# Patient Record
Sex: FEMALE | Race: WHITE | NOT HISPANIC OR LATINO | Employment: OTHER | URBAN - METROPOLITAN AREA
[De-identification: names, ages, dates, MRNs, and addresses within clinical notes are randomized per-mention and may not be internally consistent; named-entity substitution may affect disease eponyms.]

---

## 2017-04-19 ENCOUNTER — OFFICE VISIT (OUTPATIENT)
Dept: FAMILY MEDICINE | Facility: CLINIC | Age: 72
End: 2017-04-19
Payer: COMMERCIAL

## 2017-04-19 VITALS
HEART RATE: 90 BPM | TEMPERATURE: 100.6 F | WEIGHT: 215.4 LBS | DIASTOLIC BLOOD PRESSURE: 68 MMHG | SYSTOLIC BLOOD PRESSURE: 130 MMHG | BODY MASS INDEX: 38.46 KG/M2

## 2017-04-19 DIAGNOSIS — M25.562 ACUTE PAIN OF LEFT KNEE: ICD-10-CM

## 2017-04-19 DIAGNOSIS — R10.32 ABDOMINAL PAIN, LEFT LOWER QUADRANT: Primary | ICD-10-CM

## 2017-04-19 DIAGNOSIS — K59.00 CONSTIPATION, UNSPECIFIED CONSTIPATION TYPE: ICD-10-CM

## 2017-04-19 DIAGNOSIS — M75.42 IMPINGEMENT SYNDROME OF LEFT SHOULDER: ICD-10-CM

## 2017-04-19 LAB
BASOPHILS # BLD AUTO: 0 10E9/L (ref 0–0.2)
BASOPHILS NFR BLD AUTO: 0.2 %
DIFFERENTIAL METHOD BLD: NORMAL
EOSINOPHIL # BLD AUTO: 0.2 10E9/L (ref 0–0.7)
EOSINOPHIL NFR BLD AUTO: 1.9 %
ERYTHROCYTE [DISTWIDTH] IN BLOOD BY AUTOMATED COUNT: 14.2 % (ref 10–15)
HCT VFR BLD AUTO: 38.6 % (ref 35–47)
HGB BLD-MCNC: 12.7 G/DL (ref 11.7–15.7)
LYMPHOCYTES # BLD AUTO: 1.1 10E9/L (ref 0.8–5.3)
LYMPHOCYTES NFR BLD AUTO: 12.4 %
MCH RBC QN AUTO: 28.7 PG (ref 26.5–33)
MCHC RBC AUTO-ENTMCNC: 32.9 G/DL (ref 31.5–36.5)
MCV RBC AUTO: 87 FL (ref 78–100)
MONOCYTES # BLD AUTO: 0.8 10E9/L (ref 0–1.3)
MONOCYTES NFR BLD AUTO: 8.8 %
NEUTROPHILS # BLD AUTO: 6.6 10E9/L (ref 1.6–8.3)
NEUTROPHILS NFR BLD AUTO: 76.7 %
PLATELET # BLD AUTO: 197 10E9/L (ref 150–450)
RBC # BLD AUTO: 4.43 10E12/L (ref 3.8–5.2)
WBC # BLD AUTO: 8.6 10E9/L (ref 4–11)

## 2017-04-19 PROCEDURE — 85025 COMPLETE CBC W/AUTO DIFF WBC: CPT | Performed by: PHYSICIAN ASSISTANT

## 2017-04-19 PROCEDURE — 99214 OFFICE O/P EST MOD 30 MIN: CPT | Performed by: PHYSICIAN ASSISTANT

## 2017-04-19 PROCEDURE — 36415 COLL VENOUS BLD VENIPUNCTURE: CPT | Performed by: PHYSICIAN ASSISTANT

## 2017-04-19 ASSESSMENT — ENCOUNTER SYMPTOMS
SHORTNESS OF BREATH: 0
DIARRHEA: 0
NAUSEA: 0
HALLUCINATIONS: 0
INSOMNIA: 0
DIAPHORESIS: 0
BLOOD IN STOOL: 0
NEUROLOGICAL NEGATIVE: 1
CONSTIPATION: 0
FEVER: 0
ABDOMINAL PAIN: 1
DIZZINESS: 0
WHEEZING: 0
MYALGIAS: 0
PHOTOPHOBIA: 0
COUGH: 0
PALPITATIONS: 0
FOCAL WEAKNESS: 0
FREQUENCY: 0
SPUTUM PRODUCTION: 0
BLURRED VISION: 0
DYSURIA: 0
SEIZURES: 0
TINGLING: 0
NECK PAIN: 0
WEIGHT LOSS: 0
EYE REDNESS: 0
VOMITING: 0
SORE THROAT: 0
DEPRESSION: 0
DOUBLE VISION: 0
ORTHOPNEA: 0
HEADACHES: 0
EYE DISCHARGE: 0
SENSORY CHANGE: 0
HEARTBURN: 0
NERVOUS/ANXIOUS: 0
EYE PAIN: 0
HEMOPTYSIS: 0
LOSS OF CONSCIOUSNESS: 0
BACK PAIN: 0
WEAKNESS: 0

## 2017-04-19 ASSESSMENT — LIFESTYLE VARIABLES: SUBSTANCE_ABUSE: 0

## 2017-04-19 ASSESSMENT — PAIN SCALES - GENERAL: PAINLEVEL: MODERATE PAIN (4)

## 2017-04-19 NOTE — PATIENT INSTRUCTIONS
Abdominal pain resolved in the next couple of days return to clinic and we can inject that left shoulder. If abdominal pain worsens or if fevers continue, contact me and we can schedule a CT of the abdomen

## 2017-04-19 NOTE — NURSING NOTE
"Chief Complaint   Patient presents with     Shoulder Pain     Knee Pain     Chills     Abdominal Pain       Initial /68 (BP Location: Right arm, Patient Position: Chair, Cuff Size: Adult Large)  Pulse 90  Temp 100.6  F (38.1  C) (Tympanic)  Wt 215 lb 6.4 oz (97.7 kg)  BMI 38.46 kg/m2 Estimated body mass index is 38.46 kg/(m^2) as calculated from the following:    Height as of 12/12/16: 5' 2.75\" (1.594 m).    Weight as of this encounter: 215 lb 6.4 oz (97.7 kg).  Medication Reconciliation: complete    Health Maintenance that is potentially due pending provider review:  NONE    n/a    Shabana CASTELLANOS CMA    "

## 2017-04-19 NOTE — MR AVS SNAPSHOT
After Visit Summary   4/19/2017    Gail Dewitt    MRN: [id number]           Patient Information     Date Of Birth          1945        Visit Information        Provider Department      4/19/2017 2:00 PM Luan Rader PA-C [facility]        Today's Diagnoses     Abdominal pain, left lower quadrant    -  1    Constipation, unspecified constipation type        Impingement syndrome of left shoulder        Acute pain of left knee          Care Instructions    Abdominal pain resolved in the next couple of days return to clinic and we can inject that left shoulder. If abdominal pain worsens or if fevers continue, contact me and we can schedule a CT of the abdomen        Follow-ups after your visit        Follow-up notes from your care team     Return if symptoms worsen or fail to improve.      Who to contact     If you have questions or need follow up information about today's clinic visit or your schedule please contact [facility] directly at [phone].  Normal or non-critical lab and imaging results will be communicated to you by [employer], letter or phone within 4 business days after the clinic has received the results. If you do not hear from us within 7 days, please contact the clinic through [employer] or phone. If you have a critical or abnormal lab result, we will notify you by phone as soon as possible.  Submit refill requests through [employer] or call your pharmacy and they will forward the refill request to us. Please allow 3 business days for your refill to be completed.          Additional Information About Your Visit        MyChart Information     [employer] gives you secure access to your electronic health record. If you see a primary care provider, you can also send messages to your care team and make appointments. If you have questions, please call your primary care clinic.  If you do not have a primary care provider, please call [phone] and they  will assist you.        Care EveryWhere ID     This is your Care EveryWhere ID. This could be used by other organizations to access your [place] medical records  [id number]        Your Vitals Were     Pulse Temperature BMI (Body Mass Index)             90 100.6  F (38.1  C) (Tympanic) 38.46 kg/m2          Blood Pressure from Last 3 Encounters:   04/19/17 130/68   12/12/16 126/58   09/14/16 104/62    Weight from Last 3 Encounters:   04/19/17 215 lb 6.4 oz (97.7 kg)   12/12/16 209 lb 12.8 oz (95.2 kg)   04/18/16 196 lb 9.6 oz (89.2 kg)              We Performed the Following     CBC with platelets and differential        Primary Care Provider Office Phone # Fax #    Aliyah Aggarwal [phone]       [facility] [address] [place] [id number]        Thank you!     Thank you for choosing [facility]  for your care. Our goal is always to provide you with excellent care. Hearing back from our patients is one way we can continue to improve our services. Please take a few minutes to complete the written survey that you may receive in the mail after your visit with us. Thank you!             Your Updated Medication List - Protect others around you: Learn how to safely use, store and throw away your medicines at www.disposemymeds.org.          This list is accurate as of: 4/19/17  3:26 PM.  Always use your most recent med list.                   Brand Name Dispense Instructions for use    aspirin 81 MG tablet     100    1 TABLET BY MOUTH DAILY       CALCIUM 1200 PO      Take 1 tablet by mouth daily       cycloSPORINE 0.05 % ophthalmic emulsion    RESTASIS     Place 1 drop into both eyes 2 times daily       FIBER PO          levothyroxine 112 MCG tablet    SYNTHROID/LEVOTHROID    90 tablet    Take 1 tablet (112 mcg) by mouth daily       losartan 50 MG tablet    COZAAR    90 tablet    Take 1 tablet (50 mg) by mouth daily       TYLENOL PM EXTRA STRENGTH PO      Take  by mouth At Bedtime       vitamin D 2000 UNITS Caps      Take  by mouth daily.

## 2017-04-19 NOTE — PROGRESS NOTES
HPI    SUBJECTIVE:                                                    Gail Dewitt is a 72 year old female who presents to clinic today for abdominal pain was present for the past 3 days. She's had some constipation in the past and states that she hasn't had a bowel movement in the last few days. She has had chills and some night sweats associated with this. She has a normal colonoscopy in the last few years. She has been seen for constipation issues in the past but states that the fiber supplement is not helping her constipation.   Her shoulder pain has returned and she responded extremely well to a cortisone injection approximately 8 months ago. She would like another injection if possible  She has developed left knee pain and points to the medial joint line as well as the pes anserine bursal area as the source of pain. She's had no injury to cause this.    ENT Symptoms             Symptoms: cc Present Absent Comment   Fever/Chills  x  States that they were so bad Sunday that she was actually shaking   Fatigue  x     Muscle Aches   x    Eye Irritation   x    Sneezing   x    Nasal Mc/Drg   x    Sinus Pressure/Pain   x    Loss of smell  x     Dental pain   x    Sore Throat   x    Swollen Glands   x    Ear Pain/Fullness   x    Cough   x    Wheeze   x    Chest Pain   x    Shortness of breath   x    Rash       Other  x  No appetite but says that she is always wanting to drink cold water      Symptom duration:  Since Sunday    Symptom severity:     Treatments tried:     Contacts:  Not that she knows of        Abdominal Pain      Duration: months to a year     Description (location/character/radiation): Patient states that she was seen for this about a year ago and had found out that she had been constipated. Tried metamucil and since has not had what she considers normal stools. Says she has switched to a fiber pill and that she can go days without a Bm and then has a day where she does nothing but go to the  bathroom       Associated flank pain: None    Intensity:  mild    Accompanying signs and symptoms:        Fever/Chills: YES- Chills       Gas/Bloating: no        Nausea/vomitting: no        Diarrhea: no-Loose stools       Dysuria or Hematuria: no     History (previous similar pain/trauma/previous testing): none    Precipitating or alleviating factors:       Pain worse with eating/BM/urination: no       Pain relieved by BM: no     Therapies tried and outcome: Metamucil and fiber pills     LMP:  not applicable     Musculoskeletal problem/pain      Duration: knee about a month. Shoulder a year     Description  Location: Left knee and Left shoulder    Intensity:  moderate, severe    Accompanying signs and symptoms: none    History  Previous similar problem: no   Previous evaluation:  x-ray    Precipitating or alleviating factors:  Trauma or overuse: YES  Aggravating factors include: movement and laying in bed     Therapies tried and outcome: Cortisone injections for the shoulder    Problem list and histories reviewed & adjusted, as indicated.  Additional history: as documented    Patient Active Problem List   Diagnosis     Hypothyroidism     Other psoriasis     TIBIAL MUSCULAR ATROPHY     Microscopic Hematuria     CARDIOVASCULAR SCREENING; LDL GOAL LESS THAN 130     Cold sore     GERD (gastroesophageal reflux disease)     Hip pain     Senile nuclear sclerosis     Family history of breast cancer     Advanced directives, counseling/discussion     Left shoulder pain     Benign essential hypertension     Past Surgical History:   Procedure Laterality Date     COLPORRHAPHY ANTERIOR  12/6/2010    COLPORRHAPHY ANTERIOR performed by MAY RIVERA at [facility]     HYSTERECTOMY, [facility]  1978    Hysterectomy     INJECT EPIDURAL LUMBAR  10/19/2011    Procedure:INJECT EPIDURAL LUMBAR; DESHAWN-; Surgeon:HERMILA ANESTHESIA PROVIDER; Location:[facility] OR     INJECT JOINT SACROILIAC  2/7/2011    INJECT JOINT SACROILIAC performed by GENERIC  ANESTHESIA PROVIDER at [facility] OR     INJECT JOINT SACROILIAC  4/25/2012    Procedure:INJECT JOINT SACROILIAC; DESHAWN SI Joint --; Surgeon:GENERIC ANESTHESIA PROVIDER; Location:[facility]     PHACOEMULSIFICATION WITH STANDARD INTRAOCULAR LENS IMPLANT  5/26/2011    Procedure:PHACOEMULSIFICATION WITH STANDARD INTRAOCULAR LENS IMPLANT; Surgeon:JOSÉ MANUEL DORSEY; Location:[facility]     PHACOEMULSIFICATION WITH STANDARD INTRAOCULAR LENS IMPLANT  6/9/2011    Procedure:PHACOEMULSIFICATION WITH STANDARD INTRAOCULAR LENS IMPLANT; Surgeon:JOSÉ MANUEL DORSEY; Location:[facility]     SURGICAL HISTORY OF -   1948    tonsillectomy and adenoidectomy     SURGICAL HISTORY OF -   1965    appendectomy and right ovarian cyst     SURGICAL HISTORY OF -   1970    right breast biopsy     SURGICAL HISTORY OF -       bilateral foot surgery; subtalar joint fusion; bunion     SURGICAL HISTORY OF -   2009    Posterior colporrhaphy w/ posterior Pro-Lift       Social History   Substance Use Topics     Smoking status: Former Smoker     Years: 20.00     Quit date: 9/23/1985     Smokeless tobacco: Never Used     Alcohol use Yes      Comment: 1 Q 1-2 weeks     Family History   Problem Relation Age of Onset     Cardiovascular Brother      Hypertension Brother      Thyroid Disease Brother      Cardiovascular Paternal Grandmother      HEART DISEASE Paternal Grandmother      RIP MI age 71     Cardiovascular Paternal Grandfather      C.A.D. Mother      Hypertension Mother      Breast Cancer Mother      Alcohol/Drug Mother      Allergies Mother      Depression Mother      OSTEOPOROSIS Mother      Breast Cancer Maternal Grandmother      Breast Cancer Sister      OSTEOPOROSIS Sister      Thyroid Disease Father      Cardiovascular Maternal Grandfather      HEART DISEASE Maternal Grandfather      RIP MI, age 81     Neurologic Disorder Son      Obesity Daughter      GASTROINTESTINAL DISEASE Daughter      gallbladder surg     Thyroid Disease Son          Current Outpatient  Prescriptions   Medication Sig Dispense Refill     FIBER PO        levothyroxine (SYNTHROID/LEVOTHROID) 112 MCG tablet Take 1 tablet (112 mcg) by mouth daily 90 tablet 3     losartan (COZAAR) 50 MG tablet Take 1 tablet (50 mg) by mouth daily 90 tablet 3     Calcium Carbonate-Vit D-Min (CALCIUM 1200 PO) Take 1 tablet by mouth daily       Diphenhydramine-APAP, sleep, (TYLENOL PM EXTRA STRENGTH PO) Take by mouth At Bedtime       cycloSPORINE (RESTASIS) 0.05 % ophthalmic emulsion Place 1 drop into both eyes 2 times daily       Cholecalciferol (VITAMIN D) 2000 UNITS CAPS Take  by mouth daily.       ASPIRIN 81 MG OR TABS 1 TABLET BY MOUTH DAILY 100 3     Allergies   Allergen Reactions     Ace Inhibitors Cough     Amlodipine Besylate      Swellings       Dilaudid [Hydromorphone Hcl] Hives     Percocet [Oxycodone-Acetaminophen] Other (See Comments)     Severe dizziness       Labs reviewed in EPIC    Reviewed and updated as needed this visit by clinical staff       Reviewed and updated as needed this visit by Provider       Review of Systems   Constitutional: Negative for diaphoresis, fever, malaise/fatigue and weight loss.   HENT: Negative for congestion, ear discharge, ear pain, hearing loss, nosebleeds and sore throat.    Eyes: Negative for blurred vision, double vision, photophobia, pain, discharge and redness.   Respiratory: Negative for cough, hemoptysis, sputum production, shortness of breath and wheezing.    Cardiovascular: Negative for chest pain, palpitations, orthopnea and leg swelling.   Gastrointestinal: Positive for abdominal pain. Negative for blood in stool, constipation, diarrhea, heartburn, melena, nausea and vomiting.   Genitourinary: Negative.  Negative for dysuria, frequency and urgency.   Musculoskeletal: Positive for joint pain. Negative for back pain, myalgias and neck pain.   Skin: Negative for itching and rash.   Neurological: Negative.  Negative for dizziness, tingling, sensory change, focal  weakness, seizures, loss of consciousness, weakness and headaches.   Endo/Heme/Allergies: Negative.    Psychiatric/Behavioral: Negative for depression, hallucinations, substance abuse and suicidal ideas. The patient is not nervous/anxious and does not have insomnia.          Physical Exam   Constitutional: She is oriented to person, place, and time and well-developed, well-nourished, and in no distress. No distress.   HENT:   Head: Normocephalic and atraumatic.   Right Ear: External ear normal.   Left Ear: External ear normal.   Nose: Nose normal.   Eyes: Conjunctivae and EOM are normal. Pupils are equal, round, and reactive to light. Right eye exhibits no discharge. Left eye exhibits no discharge. No scleral icterus.   Neck: Normal range of motion. Neck supple. No JVD present. No tracheal deviation present. No thyromegaly present.   Cardiovascular: Normal rate, regular rhythm, normal heart sounds and intact distal pulses.  Exam reveals no gallop and no friction rub.    No murmur heard.  Pulmonary/Chest: Effort normal and breath sounds normal. No stridor. No respiratory distress. She has no wheezes. She has no rales. She exhibits no tenderness.   Abdominal: Soft. Bowel sounds are normal. She exhibits no distension and no mass. There is no hepatosplenomegaly. There is tenderness in the left lower quadrant. There is no rebound, no guarding and no CVA tenderness. No hernia.       Musculoskeletal: Normal range of motion. She exhibits no edema.        Left shoulder: She exhibits tenderness and pain. She exhibits normal range of motion, no bony tenderness, no spasm and normal strength.        Left knee: She exhibits normal range of motion and no swelling. Tenderness found. Medial joint line tenderness noted.        Legs:  Tenderness is present over the past anserine bursa as well as the medial joint line of the left knee. She has a positive impingement sign on the left shoulder exam but good strength.   Lymphadenopathy:      She has no cervical adenopathy.   Neurological: She is alert and oriented to person, place, and time. She has normal reflexes. No cranial nerve deficit. She exhibits normal muscle tone. Gait normal.   Skin: Skin is warm and dry. No rash noted. She is not diaphoretic. No erythema. No pallor.   Psychiatric: Mood, memory, affect and judgment normal.         (R10.32) Abdominal pain, left lower quadrant  (primary encounter diagnosis)  Comment:   Plan: CBC with platelets and differential            (K59.00) Constipation, unspecified constipation type  Comment:  Plan:     (M75.42) Impingement syndrome of left shoulder  Comment:   Plan:     (M25.562) Acute pain of left knee  Comment:   Plan:     CBC was within normal limits and for her abdominal pain have asked that we try a course of MiraLAX because of the constipation issues and the fact that she's had a normal colonoscopy in the recent past. If her pain persists or fevers or worsening, CT of the abdomen would be the next step.  She would like a repeat shoulder injection because her last injection works so well. However with her abdominal pain I have asked that old off on the injection until her abdominal pain has settled down.  For her knee pain this appears to be    Pes Anserine bursitis as well as some arthritic symptoms and x-ray of the knee is advised if she is interested in further treatment. She would like to hold off at this time.

## 2017-06-20 ENCOUNTER — OFFICE VISIT (OUTPATIENT)
Dept: FAMILY MEDICINE | Facility: CLINIC | Age: 72
End: 2017-06-20
Payer: COMMERCIAL

## 2017-06-20 ENCOUNTER — RADIANT APPOINTMENT (OUTPATIENT)
Dept: GENERAL RADIOLOGY | Facility: CLINIC | Age: 72
End: 2017-06-20
Attending: PHYSICIAN ASSISTANT
Payer: COMMERCIAL

## 2017-06-20 VITALS
TEMPERATURE: 97.5 F | HEART RATE: 72 BPM | HEIGHT: 63 IN | DIASTOLIC BLOOD PRESSURE: 68 MMHG | SYSTOLIC BLOOD PRESSURE: 126 MMHG

## 2017-06-20 DIAGNOSIS — M25.561 ACUTE PAIN OF RIGHT KNEE: Primary | ICD-10-CM

## 2017-06-20 DIAGNOSIS — M25.551 HIP PAIN, RIGHT: ICD-10-CM

## 2017-06-20 DIAGNOSIS — M25.561 ACUTE PAIN OF RIGHT KNEE: ICD-10-CM

## 2017-06-20 PROCEDURE — 99214 OFFICE O/P EST MOD 30 MIN: CPT | Performed by: PHYSICIAN ASSISTANT

## 2017-06-20 PROCEDURE — 73560 X-RAY EXAM OF KNEE 1 OR 2: CPT | Mod: RT

## 2017-06-20 ASSESSMENT — ENCOUNTER SYMPTOMS
HEMOPTYSIS: 0
HEARTBURN: 0
WEIGHT LOSS: 0
SPUTUM PRODUCTION: 0
FOCAL WEAKNESS: 0
ABDOMINAL PAIN: 0
FEVER: 0
DEPRESSION: 0
COUGH: 0
PHOTOPHOBIA: 0
BLURRED VISION: 0
CONSTIPATION: 0
NECK PAIN: 0
NAUSEA: 0
INSOMNIA: 0
BLOOD IN STOOL: 0
EYE REDNESS: 0
DYSURIA: 0
SENSORY CHANGE: 0
EYE PAIN: 0
EYE DISCHARGE: 0
TINGLING: 0
DIARRHEA: 0
WEAKNESS: 0
HEADACHES: 0
HALLUCINATIONS: 0
DIZZINESS: 0
NERVOUS/ANXIOUS: 0
WHEEZING: 0
ORTHOPNEA: 0
PALPITATIONS: 0
DOUBLE VISION: 0
BACK PAIN: 0
SORE THROAT: 0
SEIZURES: 0
FREQUENCY: 0
SHORTNESS OF BREATH: 0
NEUROLOGICAL NEGATIVE: 1
VOMITING: 0
DIAPHORESIS: 0
MYALGIAS: 0
LOSS OF CONSCIOUSNESS: 0

## 2017-06-20 ASSESSMENT — LIFESTYLE VARIABLES: SUBSTANCE_ABUSE: 0

## 2017-06-20 NOTE — PROGRESS NOTES
"HPI    SUBJECTIVE:                                                    Gail Dewitt is a 72 year old female who presents to clinic today for right knee pain for the past week. She also has had some associated right hip pain. She's had no injury but states that her knee \"gave out\" while she was simply standing. She denies any numbness or tingling associated with this. With her hip pain she points to the trochanteric area but also anterior buttocks area and also on that right side.      Musculoskeletal problem/pain      Duration: about one week    Description  Location: right knee and right hip    Intensity:  7/10    Accompanying signs and symptoms: none    History  Previous similar problem: no   Previous evaluation:  none    Precipitating or alleviating factors:  Trauma or overuse: no   Aggravating factors include: sitting on a hard chair    Therapies tried and outcome: aleve helps a little           Problem list and histories reviewed & adjusted, as indicated.  Additional history: as documented    Patient Active Problem List   Diagnosis     Hypothyroidism     Other psoriasis     TIBIAL MUSCULAR ATROPHY     Microscopic Hematuria     CARDIOVASCULAR SCREENING; LDL GOAL LESS THAN 130     Cold sore     GERD (gastroesophageal reflux disease)     Hip pain     Senile nuclear sclerosis     Family history of breast cancer     Advanced directives, counseling/discussion     Left shoulder pain     Benign essential hypertension     Past Surgical History:   Procedure Laterality Date     COLPORRHAPHY ANTERIOR  12/6/2010    COLPORRHAPHY ANTERIOR performed by MAY RIVERA at [facility]     CYSTOCELE REPAIR  2007     HYSTERECTOMY, JOSEE  1978    Hysterectomy     INJECT EPIDURAL LUMBAR  10/19/2011    Procedure:INJECT EPIDURAL LUMBAR; DESHAWN-; Surgeon:GENERIC ANESTHESIA PROVIDER; Location:[facility]     INJECT JOINT SACROILIAC  2/7/2011    INJECT JOINT SACROILIAC performed by GENERIC ANESTHESIA PROVIDER at [facility]     INJECT JOINT " SACROILIAC  4/25/2012    Procedure:INJECT JOINT SACROILIAC; DESHAWN SI Joint --; Surgeon:GENERIC ANESTHESIA PROVIDER; Location:[facility]     PHACOEMULSIFICATION WITH STANDARD INTRAOCULAR LENS IMPLANT  5/26/2011    Procedure:PHACOEMULSIFICATION WITH STANDARD INTRAOCULAR LENS IMPLANT; Surgeon:JOSÉ MANUEL DORSEY; Location:[facility]     PHACOEMULSIFICATION WITH STANDARD INTRAOCULAR LENS IMPLANT  6/9/2011    Procedure:PHACOEMULSIFICATION WITH STANDARD INTRAOCULAR LENS IMPLANT; Surgeon:JOSÉ MANUEL DORSEY; Location:[facility]     RECTOCELE REPAIR  2007     SURGICAL HISTORY OF -   1948    tonsillectomy and adenoidectomy     SURGICAL HISTORY OF -   1965    appendectomy and right ovarian cyst     SURGICAL HISTORY OF -   1970    right breast biopsy     SURGICAL HISTORY OF -       bilateral foot surgery; subtalar joint fusion; bunion     SURGICAL HISTORY OF -   2009    Posterior colporrhaphy w/ posterior Pro-Lift       Social History   Substance Use Topics     Smoking status: Former Smoker     Years: 20.00     Quit date: 9/23/1985     Smokeless tobacco: Never Used     Alcohol use Yes      Comment: 1 Q 1-2 weeks     Family History   Problem Relation Age of Onset     Cardiovascular Brother      Hypertension Brother      Thyroid Disease Brother      Cardiovascular Paternal Grandmother      HEART DISEASE Paternal Grandmother      RIP MI age 71     Cardiovascular Paternal Grandfather      C.A.D. Mother      Hypertension Mother      Breast Cancer Mother      Alcohol/Drug Mother      Allergies Mother      Depression Mother      OSTEOPOROSIS Mother      Breast Cancer Maternal Grandmother      Breast Cancer Sister      OSTEOPOROSIS Sister      Thyroid Disease Father      Cardiovascular Maternal Grandfather      HEART DISEASE Maternal Grandfather      RIP MI, age 81     Neurologic Disorder Son      Obesity Daughter      GASTROINTESTINAL DISEASE Daughter      gallbladder surg     Thyroid Disease Son          Current Outpatient Prescriptions    Medication Sig Dispense Refill     Omega-3 Fatty Acids (OMEGA 3 PO) Take by mouth daily       propylene glycol (SYSTANE BALANCE) 0.6 % SOLN ophthalmic solution 1 drop       nabumetone (RELAFEN) 750 MG tablet Take 1 tablet (750 mg) by mouth 2 times daily as needed for moderate pain 30 tablet 1     levothyroxine (SYNTHROID/LEVOTHROID) 112 MCG tablet Take 1 tablet (112 mcg) by mouth daily 90 tablet 3     losartan (COZAAR) 50 MG tablet Take 1 tablet (50 mg) by mouth daily 90 tablet 3     Calcium Carbonate-Vit D-Min (CALCIUM 1200 PO) Take 1 tablet by mouth daily       Diphenhydramine-APAP, sleep, (TYLENOL PM EXTRA STRENGTH PO) Take by mouth At Bedtime       cycloSPORINE (RESTASIS) 0.05 % ophthalmic emulsion Place 1 drop into both eyes 2 times daily       ASPIRIN 81 MG OR TABS 1 TABLET BY MOUTH DAILY 100 3     Cholecalciferol (VITAMIN D) 2000 UNITS CAPS Take  by mouth daily.       Allergies   Allergen Reactions     Ace Inhibitors Cough     Amlodipine Besylate      Swellings       Dilaudid [Hydromorphone Hcl] Hives     Percocet [Oxycodone-Acetaminophen] Other (See Comments)     Severe dizziness       Labs reviewed in EPIC    Reviewed and updated as needed this visit by clinical staff       Reviewed and updated as needed this visit by Provider               Review of Systems   Constitutional: Negative for diaphoresis, fever, malaise/fatigue and weight loss.   HENT: Negative for congestion, ear discharge, ear pain, hearing loss, nosebleeds and sore throat.    Eyes: Negative for blurred vision, double vision, photophobia, pain, discharge and redness.   Respiratory: Negative for cough, hemoptysis, sputum production, shortness of breath and wheezing.    Cardiovascular: Negative for chest pain, palpitations, orthopnea and leg swelling.   Gastrointestinal: Negative for abdominal pain, blood in stool, constipation, diarrhea, heartburn, melena, nausea and vomiting.   Genitourinary: Negative.  Negative for dysuria, frequency and  urgency.   Musculoskeletal: Positive for joint pain. Negative for back pain, myalgias and neck pain.   Skin: Negative for itching and rash.   Neurological: Negative.  Negative for dizziness, tingling, sensory change, focal weakness, seizures, loss of consciousness, weakness and headaches.   Endo/Heme/Allergies: Negative.    Psychiatric/Behavioral: Negative for depression, hallucinations, substance abuse and suicidal ideas. The patient is not nervous/anxious and does not have insomnia.          Physical Exam   Constitutional: She is oriented to person, place, and time and well-developed, well-nourished, and in no distress. No distress.   HENT:   Head: Normocephalic and atraumatic.   Right Ear: External ear normal.   Left Ear: External ear normal.   Nose: Nose normal.   Eyes: Conjunctivae and EOM are normal. Pupils are equal, round, and reactive to light. Right eye exhibits no discharge. Left eye exhibits no discharge. No scleral icterus.   Neck: Normal range of motion. Neck supple. No JVD present. No tracheal deviation present. No thyromegaly present.   Cardiovascular: Normal rate, regular rhythm, normal heart sounds and intact distal pulses.  Exam reveals no gallop and no friction rub.    No murmur heard.  Pulmonary/Chest: Effort normal and breath sounds normal. No stridor. No respiratory distress. She has no wheezes. She has no rales. She exhibits no tenderness.   Abdominal: Soft. Bowel sounds are normal. She exhibits no distension and no mass. There is no tenderness. There is no rebound and no guarding.   Musculoskeletal: Normal range of motion. She exhibits no edema.        Right hip: She exhibits tenderness and bony tenderness. She exhibits normal range of motion, normal strength, no swelling, no crepitus and no deformity.        Right knee: She exhibits normal range of motion, no swelling, no effusion, no ecchymosis, no deformity, no erythema, normal alignment, no LCL laxity, no bony tenderness, normal meniscus  and no MCL laxity. Tenderness found. Medial joint line and lateral joint line tenderness noted.   Lymphadenopathy:     She has no cervical adenopathy.   Neurological: She is alert and oriented to person, place, and time. She has normal reflexes. No cranial nerve deficit. She exhibits normal muscle tone. Gait normal.   Skin: Skin is warm and dry. No rash noted. She is not diaphoretic. No erythema. No pallor.   Psychiatric: Mood, memory, affect and judgment normal.         (M25.561) Acute pain of right knee  (primary encounter diagnosis)  Comment:   Plan: nabumetone (RELAFEN) 750 MG tablet, CANCELED:         XR Knee Standing Right 2 Views            (M25.551) Hip pain, right  Comment:   Plan:    X-rays reveal no acute abnormality but there is some calcium changes within the meniscus itself on both knees and some lateral compartment arthritic changes.  I have suggested anti-inflammatory medications in the form of Relafen 750 mg twice a day and if symptoms are not improving over the next 1-2 weeks consider injection, physical therapy

## 2017-06-20 NOTE — MR AVS SNAPSHOT
After Visit Summary   6/20/2017    Gail Dewitt    MRN: [id number]           Patient Information     Date Of Birth          1945        Visit Information        Provider Department      6/20/2017 10:00 AM Luan Rader PA-C [facility]        Today's Diagnoses     Acute pain of right knee    -  1    Hip pain, right          Care Instructions    If Relafen is not resolving symptoms, recheck and consider injection or physical therapy or orthopedic referral          Follow-ups after your visit        Follow-up notes from your care team     Return if symptoms worsen or fail to improve.      Who to contact     If you have questions or need follow up information about today's clinic visit or your schedule please contact [facility] directly at [phone].  Normal or non-critical lab and imaging results will be communicated to you by [employer], letter or phone within 4 business days after the clinic has received the results. If you do not hear from us within 7 days, please contact the clinic through [employer] or phone. If you have a critical or abnormal lab result, we will notify you by phone as soon as possible.  Submit refill requests through [employer] or call your pharmacy and they will forward the refill request to us. Please allow 3 business days for your refill to be completed.          Additional Information About Your Visit        MyChart Information     [employer] gives you secure access to your electronic health record. If you see a primary care provider, you can also send messages to your care team and make appointments. If you have questions, please call your primary care clinic.  If you do not have a primary care provider, please call [phone] and they will assist you.        Care EveryWhere ID     This is your Care EveryWhere ID. This could be used by other organizations to access your [facility] medical records  [id number]        Your Vitals Were      "Pulse Temperature Height             72 97.5  F (36.4  C) (Tympanic) 5' 2.75\" (1.594 m)          Blood Pressure from Last 3 Encounters:   06/20/17 126/68   04/19/17 130/68   12/12/16 126/58    Weight from Last 3 Encounters:   04/19/17 215 lb 6.4 oz (97.7 kg)   12/12/16 209 lb 12.8 oz (95.2 kg)   04/18/16 196 lb 9.6 oz (89.2 kg)              Today, you had the following     No orders found for display         Today's Medication Changes          These changes are accurate as of: 6/20/17 12:17 PM.  If you have any questions, ask your nurse or doctor.               Start taking these medicines.        Dose/Directions    nabumetone 750 MG tablet   Commonly known as:  RELAFEN   Used for:  Acute pain of right knee   Started by:  Luan Rader PA-C        Dose:  750 mg   Take 1 tablet (750 mg) by mouth 2 times daily as needed for moderate pain   Quantity:  30 tablet   Refills:  1         Stop taking these medicines if you haven't already. Please contact your care team if you have questions.     FIBER PO   Stopped by:  Luan Rader PA-C                Where to get your medicines      These medications were sent to [id number] [address] [facility]  [address] [place] [id number]    Hours:  Closed 10-16-08 business to [facility] Phone:  [phone]     nabumetone 750 MG tablet                Primary Care Provider Office Phone # Fax #    Aliyah Aggarwal [phone]       [facility] [id number] [facility] [address]        Thank you!     Thank you for choosing [facility]  for your care. Our goal is always to provide you with excellent care. Hearing back from our patients is one way we can continue to improve our services. Please take a few minutes to complete the written survey that you may receive in the mail after your visit with us. Thank you!             Your Updated Medication List - Protect others around you: Learn " how to safely use, store and throw away your medicines at www.disposemymeds.org.          This list is accurate as of: 6/20/17 12:17 PM.  Always use your most recent med list.                   Brand Name Dispense Instructions for use    aspirin 81 MG tablet     100    1 TABLET BY MOUTH DAILY       CALCIUM 1200 PO      Take 1 tablet by mouth daily       cycloSPORINE 0.05 % ophthalmic emulsion    RESTASIS     Place 1 drop into both eyes 2 times daily       levothyroxine 112 MCG tablet    SYNTHROID/LEVOTHROID    90 tablet    Take 1 tablet (112 mcg) by mouth daily       losartan 50 MG tablet    COZAAR    90 tablet    Take 1 tablet (50 mg) by mouth daily       nabumetone 750 MG tablet    RELAFEN    30 tablet    Take 1 tablet (750 mg) by mouth 2 times daily as needed for moderate pain       OMEGA 3 PO      Take by mouth daily       propylene glycol 0.6 % Soln ophthalmic solution    SYSTANE BALANCE     1 drop       TYLENOL PM EXTRA STRENGTH PO      Take by mouth At Bedtime       vitamin D 2000 UNITS Caps      Take  by mouth daily.

## 2017-06-20 NOTE — PATIENT INSTRUCTIONS
If Relafen is not resolving symptoms, recheck and consider injection or physical therapy or orthopedic referral

## 2017-06-20 NOTE — NURSING NOTE
"Chief Complaint   Patient presents with     Musculoskeletal Problem       Initial /68 (BP Location: Right arm, Cuff Size: Adult Large)  Pulse 72  Temp 97.5  F (36.4  C) (Tympanic)  Ht 5' 2.75\" (1.594 m) Estimated body mass index is 38.46 kg/(m^2) as calculated from the following:    Height as of 12/12/16: 5' 2.75\" (1.594 m).    Weight as of 4/19/17: 215 lb 6.4 oz (97.7 kg).  Medication Reconciliation: complete     Charley Mclain MA     "

## 2017-10-02 ENCOUNTER — RADIANT APPOINTMENT (OUTPATIENT)
Dept: MAMMOGRAPHY | Facility: CLINIC | Age: 72
End: 2017-10-02
Attending: FAMILY MEDICINE
Payer: COMMERCIAL

## 2017-10-02 ENCOUNTER — OFFICE VISIT (OUTPATIENT)
Dept: FAMILY MEDICINE | Facility: CLINIC | Age: 72
End: 2017-10-02
Payer: COMMERCIAL

## 2017-10-02 VITALS
BODY MASS INDEX: 38.8 KG/M2 | DIASTOLIC BLOOD PRESSURE: 60 MMHG | SYSTOLIC BLOOD PRESSURE: 130 MMHG | HEART RATE: 72 BPM | HEIGHT: 63 IN | TEMPERATURE: 98.1 F | WEIGHT: 219 LBS

## 2017-10-02 DIAGNOSIS — G89.29 CHRONIC LEFT SHOULDER PAIN: ICD-10-CM

## 2017-10-02 DIAGNOSIS — Z12.31 VISIT FOR SCREENING MAMMOGRAM: ICD-10-CM

## 2017-10-02 DIAGNOSIS — E03.9 ACQUIRED HYPOTHYROIDISM: ICD-10-CM

## 2017-10-02 DIAGNOSIS — Z00.01 ENCOUNTER FOR WELL ADULT EXAM WITH ABNORMAL FINDINGS: Primary | ICD-10-CM

## 2017-10-02 DIAGNOSIS — M25.512 CHRONIC LEFT SHOULDER PAIN: ICD-10-CM

## 2017-10-02 DIAGNOSIS — K21.9 GASTROESOPHAGEAL REFLUX DISEASE WITHOUT ESOPHAGITIS: ICD-10-CM

## 2017-10-02 DIAGNOSIS — Z23 NEED FOR PROPHYLACTIC VACCINATION AND INOCULATION AGAINST INFLUENZA: ICD-10-CM

## 2017-10-02 DIAGNOSIS — I10 BENIGN ESSENTIAL HYPERTENSION: ICD-10-CM

## 2017-10-02 PROBLEM — Z71.89 ADVANCED DIRECTIVES, COUNSELING/DISCUSSION: Status: ACTIVE | Noted: 2017-10-02

## 2017-10-02 LAB
ALBUMIN SERPL-MCNC: 3.4 G/DL (ref 3.4–5)
ALP SERPL-CCNC: 286 U/L (ref 40–150)
ALT SERPL W P-5'-P-CCNC: 36 U/L (ref 0–50)
ANION GAP SERPL CALCULATED.3IONS-SCNC: 5 MMOL/L (ref 3–14)
AST SERPL W P-5'-P-CCNC: 40 U/L (ref 0–45)
BASOPHILS # BLD AUTO: 0 10E9/L (ref 0–0.2)
BASOPHILS NFR BLD AUTO: 0.3 %
BILIRUB DIRECT SERPL-MCNC: 0.2 MG/DL (ref 0–0.2)
BILIRUB SERPL-MCNC: 0.5 MG/DL (ref 0.2–1.3)
BUN SERPL-MCNC: 23 MG/DL (ref 7–30)
CALCIUM SERPL-MCNC: 8.7 MG/DL (ref 8.5–10.1)
CHLORIDE SERPL-SCNC: 104 MMOL/L (ref 94–109)
CHOLEST SERPL-MCNC: 228 MG/DL
CO2 SERPL-SCNC: 28 MMOL/L (ref 20–32)
CREAT SERPL-MCNC: 0.67 MG/DL (ref 0.52–1.04)
DIFFERENTIAL METHOD BLD: NORMAL
EOSINOPHIL # BLD AUTO: 0.6 10E9/L (ref 0–0.7)
EOSINOPHIL NFR BLD AUTO: 8.5 %
ERYTHROCYTE [DISTWIDTH] IN BLOOD BY AUTOMATED COUNT: 13.7 % (ref 10–15)
GFR SERPL CREATININE-BSD FRML MDRD: 86 ML/MIN/1.7M2
GLUCOSE SERPL-MCNC: 85 MG/DL (ref 70–99)
HCT VFR BLD AUTO: 38.5 % (ref 35–47)
HDLC SERPL-MCNC: 86 MG/DL
HGB BLD-MCNC: 12.4 G/DL (ref 11.7–15.7)
LDLC SERPL CALC-MCNC: 129 MG/DL
LYMPHOCYTES # BLD AUTO: 1.3 10E9/L (ref 0.8–5.3)
LYMPHOCYTES NFR BLD AUTO: 19.8 %
MCH RBC QN AUTO: 28.4 PG (ref 26.5–33)
MCHC RBC AUTO-ENTMCNC: 32.2 G/DL (ref 31.5–36.5)
MCV RBC AUTO: 88 FL (ref 78–100)
MONOCYTES # BLD AUTO: 0.6 10E9/L (ref 0–1.3)
MONOCYTES NFR BLD AUTO: 9 %
NEUTROPHILS # BLD AUTO: 4.1 10E9/L (ref 1.6–8.3)
NEUTROPHILS NFR BLD AUTO: 62.4 %
NONHDLC SERPL-MCNC: 142 MG/DL
PLATELET # BLD AUTO: 257 10E9/L (ref 150–450)
POTASSIUM SERPL-SCNC: 4.2 MMOL/L (ref 3.4–5.3)
PROT SERPL-MCNC: 7.7 G/DL (ref 6.8–8.8)
RBC # BLD AUTO: 4.36 10E12/L (ref 3.8–5.2)
SODIUM SERPL-SCNC: 137 MMOL/L (ref 133–144)
T4 FREE SERPL-MCNC: 1.36 NG/DL (ref 0.76–1.46)
TRIGL SERPL-MCNC: 66 MG/DL
TSH SERPL DL<=0.005 MIU/L-ACNC: 5.54 MU/L (ref 0.4–4)
WBC # BLD AUTO: 6.5 10E9/L (ref 4–11)

## 2017-10-02 PROCEDURE — 85025 COMPLETE CBC W/AUTO DIFF WBC: CPT | Performed by: FAMILY MEDICINE

## 2017-10-02 PROCEDURE — G0008 ADMIN INFLUENZA VIRUS VAC: HCPCS | Performed by: FAMILY MEDICINE

## 2017-10-02 PROCEDURE — 90662 IIV NO PRSV INCREASED AG IM: CPT | Performed by: FAMILY MEDICINE

## 2017-10-02 PROCEDURE — 80061 LIPID PANEL: CPT | Performed by: FAMILY MEDICINE

## 2017-10-02 PROCEDURE — G0438 PPPS, INITIAL VISIT: HCPCS | Mod: 25 | Performed by: FAMILY MEDICINE

## 2017-10-02 PROCEDURE — 84439 ASSAY OF FREE THYROXINE: CPT | Performed by: FAMILY MEDICINE

## 2017-10-02 PROCEDURE — 84443 ASSAY THYROID STIM HORMONE: CPT | Performed by: FAMILY MEDICINE

## 2017-10-02 PROCEDURE — 80048 BASIC METABOLIC PNL TOTAL CA: CPT | Performed by: FAMILY MEDICINE

## 2017-10-02 PROCEDURE — G0202 SCR MAMMO BI INCL CAD: HCPCS | Mod: TC

## 2017-10-02 PROCEDURE — 80076 HEPATIC FUNCTION PANEL: CPT | Performed by: FAMILY MEDICINE

## 2017-10-02 PROCEDURE — 36415 COLL VENOUS BLD VENIPUNCTURE: CPT | Performed by: FAMILY MEDICINE

## 2017-10-02 PROCEDURE — 20610 DRAIN/INJ JOINT/BURSA W/O US: CPT | Mod: LT | Performed by: FAMILY MEDICINE

## 2017-10-02 RX ORDER — LOSARTAN POTASSIUM 50 MG/1
50 TABLET ORAL DAILY
Qty: 90 TABLET | Refills: 3 | Status: SHIPPED | OUTPATIENT
Start: 2017-10-02

## 2017-10-02 RX ORDER — LEVOTHYROXINE SODIUM 112 UG/1
112 TABLET ORAL DAILY
Qty: 90 TABLET | Refills: 3 | Status: SHIPPED | OUTPATIENT
Start: 2017-10-02 | End: 2017-12-21 | Stop reason: DRUGHIGH

## 2017-10-02 RX ORDER — TRIAMCINOLONE ACETONIDE 40 MG/ML
40 INJECTION, SUSPENSION INTRA-ARTICULAR; INTRAMUSCULAR ONCE
Qty: 1 ML | Refills: 0 | COMMUNITY
Start: 2017-10-02 | End: 2017-10-05

## 2017-10-02 NOTE — PROGRESS NOTES
SUBJECTIVE:   Gail Dewitt is a 72 year old female who presents for Preventive Visit.      Are you in the first 12 months of your Medicare Part B coverage?  No    Healthy Habits:    Do you get at least three servings of calcium containing foods daily (dairy, green leafy vegetables, etc.)? no, taking calcium and/or vitamin D supplement: yes -     Amount of exercise or daily activities, outside of work: 2 day(s) per week    Problems taking medications regularly No    Medication side effects: No    Have you had an eye exam in the past two years? yes    Do you see a dentist twice per year? No, dentures    Do you have sleep apnea, excessive snoring or daytime drowsiness?no    COGNITIVE SCREEN  1) Repeat 3 items (Banana, Sunrise, Chair)    2) Clock draw: NORMAL  3) 3 item recall: Recalls 3 objects  Results: 3 items recalled: COGNITIVE IMPAIRMENT LESS LIKELY    Mini-CogTM Copyright [address]. Licensed by the author for use in [facility]; reprinted with permission ([email or web address]). All rights reserved.              Musculoskeletal problem/pain      Duration: years     Description  Location: left shoulder pain she has had this for several years and has had injections in the past and this has helped   Slowly this has increased with now pain with over the head and laying on it at night     Intensity:  moderate    Accompanying signs and symptoms: none    History  Previous similar problem: YES  Previous evaluation:  none    Precipitating or alleviating factors:  Trauma or overuse: no   Aggravating factors include: overuse    Therapies tried and outcome: rest/inactivity, heat and ice    Hypertension Follow-up      Outpatient blood pressures are not being checked.    Low Salt Diet: no added salt    Hypothyroidism Follow-up      Since last visit, patient describes the following symptoms: Weight stable, no hair loss, no skin changes, no constipation, no loose stools          Reviewed and updated as needed this visit by  clinical staff  Tobacco  Allergies  Meds  Problems         Reviewed and updated as needed this visit by Provider  Allergies  Meds  Problems        Social History   Substance Use Topics     Smoking status: Former Smoker     Years: 20.00     Quit date: 9/23/1985     Smokeless tobacco: Never Used     Alcohol use Yes      Comment: 1 Q 1-2 weeks       The patient does not drink >3 drinks per day nor >7 drinks per week.    Today's PHQ-2 Score:   PHQ-2 ( 1999 Pf[employer]) 10/2/2017 6/20/2017   Q1: Little interest or pleasure in doing things 0 0   Q2: Feeling down, depressed or hopeless 0 0   PHQ-2 Score 0 0         Do you feel safe in your environment - Yes    Do you have a Health Care Directive?: Yes: Advance Directive has been received and scanned.    Current providers sharing in care for this patient include: Patient Care Team:  Aliyah Aggarwal MD as PCP - General (Family Practice)      Hearing impairment: No    Ability to successfully perform activities of daily living: Yes, no assistance needed     Fall risk:  Fallen 2 or more times in the past year?: Yes  Any fall with injury in the past year?: No      Home safety:  lack of grab bars in the bathroom      The following health maintenance items are reviewed in Epic and correct as of today:  Health Maintenance   Topic Date Due     PHQ-9 Q1YR  02/07/1962     ANYA QUESTIONNAIRE 1 YEAR  10/28/2014     INFLUENZA VACCINE (SYSTEM ASSIGNED)  09/01/2017     TSH Q1 YEAR  12/12/2017     FALL RISK ASSESSMENT  06/20/2018     MAMMO Q1 YR  10/02/2018     TETANUS IMMUNIZATION (SYSTEM ASSIGNED)  06/01/2019     COLON CANCER SCREEN (SYSTEM ASSIGNED)  04/22/2020     LIPID SCREEN Q5 YR FEMALE (SYSTEM ASSIGNED)  12/12/2021     ADVANCE DIRECTIVE PLANNING Q5 YRS  10/02/2022     DEXA SCAN SCREENING (SYSTEM ASSIGNED)  Completed     PNEUMOCOCCAL  Completed     Labs reviewed in EPIC        ROS:  C: NEGATIVE for fever, chills, change in weight  I: NEGATIVE for worrisome rashes, moles or  "lesions  E: NEGATIVE for vision changes or irritation  E/M: NEGATIVE for ear, mouth and throat problems  R: NEGATIVE for significant cough or SOB  B: NEGATIVE for masses, tenderness or discharge  CV: NEGATIVE for chest pain, palpitations or peripheral edema  GI: NEGATIVE for nausea, abdominal pain, heartburn, or change in bowel habits  : NEGATIVE for frequency, dysuria, or hematuria  N: NEGATIVE for weakness, dizziness or paresthesias  E: NEGATIVE for temperature intolerance, skin/hair changes  H: NEGATIVE for bleeding problems  P: NEGATIVE for changes in mood or affect    OBJECTIVE:   /60 (BP Location: Right arm, Patient Position: Chair, Cuff Size: Adult Large)  Pulse 72  Temp 98.1  F (36.7  C) (Tympanic)  Ht 5' 2.75\" (1.594 m)  Wt 219 lb (99.3 kg)  BMI 39.1 kg/m2 Estimated body mass index is 39.1 kg/(m^2) as calculated from the following:    Height as of this encounter: 5' 2.75\" (1.594 m).    Weight as of this encounter: 219 lb (99.3 kg).  EXAM:   GENERAL: healthy, alert and no distress  EYES: Eyes grossly normal to inspection, PERRL and conjunctivae and sclerae normal  HENT: ear canals and TM's normal, nose and mouth without ulcers or lesions  NECK: no adenopathy, no asymmetry, masses, or scars and thyroid normal to palpation  RESP: lungs clear to auscultation - no rales, rhonchi or wheezes  BREAST: normal without masses, tenderness or nipple discharge and no palpable axillary masses or adenopathy  CV: regular rate and rhythm, normal S1 S2, no S3 or S4, no murmur, click or rub, no peripheral edema and peripheral pulses strong  ABDOMEN: soft, nontender, no hepatosplenomegaly, no masses and bowel sounds normal  MS: no gross musculoskeletal defects noted, no edema  Left shoulder is tneder to palpation anteriorly full ROM active and passive with pain reproduced with internal and external rotation   SKIN: no suspicious lesions or rashes  NEURO: Normal strength and tone, mentation intact and speech " "normal  PSYCH: mentation appears normal, affect normal/bright    PROCEDURE:  JOINT ASPIRATION/INJECTION.    After a discussion of risks, benefits and side effects of procedure, informed patient consent was obtained.  The left shoulder was prepped and draped in the usual clean fashion (sterile not required for this procedure).       INJECTION:  Using 1.0 cc of 1 % lidocaine mixed with 40 mg of kenalog, the left shoulder was successfully injected without complication.  Patient did experience some pain relief following injection.        ASSESSMENT / PLAN:   1. Encounter for well adult exam with abnormal findings      2. Acquired hypothyroidism  Adjust meds as indicated by above labs.   - levothyroxine (SYNTHROID/LEVOTHROID) 112 MCG tablet; Take 1 tablet (112 mcg) by mouth daily  Dispense: 90 tablet; Refill: 3  - TSH with free T4 reflex    3. Benign essential hypertension  Stable no change in treatment plan.   - losartan (COZAAR) 50 MG tablet; Take 1 tablet (50 mg) by mouth daily  Dispense: 90 tablet; Refill: 3  - Hepatic panel  - Basic metabolic panel  - CBC with platelets differential  - Lipid panel reflex to direct LDL    4. Gastroesophageal reflux disease without esophagitis    - ranitidine (ZANTAC) 150 MG tablet; Take 1 tablet (150 mg) by mouth 2 times daily  Dispense: 60 tablet; Refill: 1    5. Chronic left shoulder pain  Flare of chronic pain   - DRAIN/INJECT LARGE JOINT/BURSA    6. Need for prophylactic vaccination and inoculation against influenza    - FLU VACCINE, INCREASED ANTIGEN, PRESV FREE, AGE 65+ [66760]  - ADMIN INFLUENZA (For MEDICARE Patients ONLY) []    End of Life Planning:  Patient currently has an advanced directive: Yes.  Practitioner is supportive of decision.    COUNSELING:  Reviewed preventive health counseling, as reflected in patient instructions        Estimated body mass index is 39.1 kg/(m^2) as calculated from the following:    Height as of this encounter: 5' 2.75\" (1.594 m).    " Weight as of this encounter: 219 lb (99.3 kg).  Weight management plan: Discussed healthy diet and exercise guidelines and patient will follow up in 6 months in clinic to re-evaluate.   reports that she quit smoking about 32 years ago. She quit after 20.00 years of use. She has never used smokeless tobacco.        Appropriate preventive services were discussed with this patient, including applicable screening as appropriate for cardiovascular disease, diabetes, osteopenia/osteoporosis, and glaucoma.  As appropriate for age/gender, discussed screening for colorectal cancer, prostate cancer, breast cancer, and cervical cancer. Checklist reviewing preventive services available has been given to the patient.    Reviewed patients plan of care and provided an AVS. The Basic Care Plan (routine screening as documented in Health Maintenance) for Gail meets the Care Plan requirement. This Care Plan has been established and reviewed with the Patient.    Counseling Resources:  ATP IV Guidelines  Pooled Cohorts Equation Calculator  Breast Cancer Risk Calculator  FRAX Risk Assessment  ICSI Preventive Guidelines  Dietary Guidelines for Americans, 2010  "[employer]'s MyPlate  ASA Prophylaxis  Lung CA Screening    Aliyah Aggarwal MD  [facility] Influenza Immunization Documentation    1.  Is the person to be vaccinated sick today?   No    2. Does the person to be vaccinated have an allergy to a component   of the vaccine?   No    3. Has the person to be vaccinated ever had a serious reaction   to influenza vaccine in the past?   No    4. Has the person to be vaccinated ever had Guillain-Barré syndrome?   No    Form completed by patient

## 2017-10-02 NOTE — MR AVS SNAPSHOT
After Visit Summary   10/2/2017    Gail Dewitt    MRN: [id number]           Patient Information     Date Of Birth          1945        Visit Information        Provider Department      10/2/2017 8:40 AM Aliyah Aggarwal MD [facility]        Today's Diagnoses     Encounter for well adult exam with abnormal findings    -  1    Acquired hypothyroidism        Benign essential hypertension        Gastroesophageal reflux disease without esophagitis        Chronic left shoulder pain          Care Instructions      Preventive Health Recommendations  Female Ages 65 +    Yearly exam:     See your health care provider every year in order to  o Review health changes.   o Discuss preventive care.    o Review your medicines if your doctor has prescribed any.      You no longer need a yearly Pap test unless you've had an abnormal Pap test in the past 10 years. If you have vaginal symptoms, such as bleeding or discharge, be sure to talk with your provider about a Pap test.      Every 1 to 2 years, have a mammogram.  If you are over 69, talk with your health care provider about whether or not you want to continue having screening mammograms.      Every 10 years, have a colonoscopy. Or, have a yearly FIT test (stool test). These exams will check for colon cancer.       Have a cholesterol test every 5 years, or more often if your doctor advises it.       Have a diabetes test (fasting glucose) every three years. If you are at risk for diabetes, you should have this test more often.       At age 65, have a bone density scan (DEXA) to check for osteoporosis (brittle bone disease).    Shots:    Get a flu shot each year.    Get a tetanus shot every 10 years.    Talk to your doctor about your pneumonia vaccines. There are now two you should receive - Pneumovax (PPSV 23) and Prevnar (PCV 13).    Talk to your doctor about the shingles vaccine.    Talk to your doctor about the hepatitis B  vaccine.    Nutrition:     Eat at least 5 servings of fruits and vegetables each day.      Eat whole-grain bread, whole-wheat pasta and brown rice instead of white grains and rice.      Talk to your provider about Calcium and Vitamin D.     Lifestyle    Exercise at least 150 minutes a week (30 minutes a day, 5 days a week). This will help you control your weight and prevent disease.      Limit alcohol to one drink per day.      No smoking.       Wear sunscreen to prevent skin cancer.       See your dentist twice a year for an exam and cleaning.      See your eye doctor every 1 to 2 years to screen for conditions such as glaucoma, macular degeneration, cataracts, etc     Labs today     Left should injected. This may be slightly more painful today and then should get better    Medications refilled     Recheck with me in 6 months           Follow-ups after your visit        Who to contact     If you have questions or need follow up information about today's clinic visit or your schedule please contact [facility] directly at [phone].  Normal or non-critical lab and imaging results will be communicated to you by [employer], letter or phone within 4 business days after the clinic has received the results. If you do not hear from us within 7 days, please contact the clinic through [email or web address] or phone. If you have a critical or abnormal lab result, we will notify you by phone as soon as possible.  Submit refill requests through [email or web address] or call your pharmacy and they will forward the refill request to us. Please allow 3 business days for your refill to be completed.          Additional Information About Your Visit        [email or web address] Information     [email or web address] gives you secure access to your electronic health record. If you see a primary care provider, you can also send messages to your care team and make appointments. If you have questions, please call your primary care clinic.  If you do not have a primary care  "provider, please call [phone] and they will assist you.        Care EveryWhere ID     This is your Care EveryWhere ID. This could be used by other organizations to access your [facility] medical records  [id number]        Your Vitals Were     Pulse Temperature Height BMI (Body Mass Index)          72 98.1  F (36.7  C) (Tympanic) 5' 2.75\" (1.594 m) 39.1 kg/m2         Blood Pressure from Last 3 Encounters:   10/02/17 130/60   06/20/17 126/68   04/19/17 130/68    Weight from Last 3 Encounters:   10/02/17 219 lb (99.3 kg)   04/19/17 215 lb 6.4 oz (97.7 kg)   12/12/16 209 lb 12.8 oz (95.2 kg)              We Performed the Following     Basic metabolic panel     CBC with platelets differential     Hepatic panel     Lipid panel reflex to direct LDL     TSH with free T4 reflex          Today's Medication Changes          These changes are accurate as of: 10/2/17  9:17 AM.  If you have any questions, ask your nurse or doctor.               These medicines have changed or have updated prescriptions.        Dose/Directions    * ZANTAC 75 PO   This may have changed:  Another medication with the same name was added. Make sure you understand how and when to take each.   Changed by:  Aliyah Aggarwal MD        Dose:  1 tablet   Take 1 tablet by mouth daily   Refills:  0       * ranitidine 150 MG tablet   Commonly known as:  ZANTAC   This may have changed:  You were already taking a medication with the same name, and this prescription was added. Make sure you understand how and when to take each.   Used for:  Gastroesophageal reflux disease without esophagitis   Changed by:  Aliyah Aggarwal MD        Dose:  150 mg   Take 1 tablet (150 mg) by mouth 2 times daily   Quantity:  60 tablet   Refills:  1       * Notice:  This list has 2 medication(s) that are the same as other medications prescribed for you. Read the directions carefully, and ask your doctor or other care provider to review them with you.         Where " to get your medicines      These medications were sent to [id number] - [place], [place] - [address]  [address] [address]    Hours:  Closed 10-16-08 business to [facility] Phone:  [phone]     levothyroxine 112 MCG tablet    losartan 50 MG tablet    ranitidine 150 MG tablet                Primary Care Provider Office Phone # Fax #    Aliyah Aggarwal [phone] [address] [facility] [address]        Equal Access to Services     : Hadii aad ku hadasho Soomaali, waaxda luqadaha, qaybta kaalmada adeegyada, waxay bernadette bermudez . So [place] -[phone].    ATENCIÓN: Si habla español, tiene a cates disposición servicios gratuitos de asistencia lingüística. Ll[facility] [phone].    We comply with applicable federal civil rights laws and [place] laws. We do not discriminate on the basis of race, color, national origin, age, disability, sex, sexual orientation, or gender identity.            Thank you!     Thank you for choosing [facility]  for your care. Our goal is always to provide you with excellent care. Hearing back from our patients is one way we can continue to improve our services. Please take a few minutes to complete the written survey that you may receive in the mail after your visit with us. Thank you!             Your Updated Medication List - Protect others around you: Learn how to safely use, store and throw away your medicines at www.disposemymeds.org.          This list is accurate as of: 10/2/17  9:17 AM.  Always use your most recent med list.                   Brand Name Dispense Instructions for use Diagnosis    aspirin 81 MG tablet     100    1 TABLET BY MOUTH DAILY    Essential hypertension, benign       CALCIUM 1200 PO      Take 1 tablet by mouth daily        cycloSPORINE 0.05 % ophthalmic emulsion    RESTASIS     Place 1 drop into both eyes 2 times daily         levothyroxine 112 MCG tablet    SYNTHROID/LEVOTHROID    90 tablet    Take 1 tablet (112 mcg) by mouth daily    Acquired hypothyroidism       losartan 50 MG tablet    COZAAR    90 tablet    Take 1 tablet (50 mg) by mouth daily    Benign essential hypertension       nabumetone 750 MG tablet    RELAFEN    30 tablet    Take 1 tablet (750 mg) by mouth 2 times daily as needed for moderate pain    Acute pain of right knee       OMEGA 3 PO      Take by mouth daily        propylene glycol 0.6 % Soln ophthalmic solution    SYSTANE BALANCE     1 drop        TYLENOL PM EXTRA STRENGTH PO      Take by mouth At Bedtime    Pain of left lower extremity       vitamin D 2000 UNITS Caps      Take  by mouth daily.        * ZANTAC 75 PO      Take 1 tablet by mouth daily        * ranitidine 150 MG tablet    ZANTAC    60 tablet    Take 1 tablet (150 mg) by mouth 2 times daily    Gastroesophageal reflux disease without esophagitis       * Notice:  This list has 2 medication(s) that are the same as other medications prescribed for you. Read the directions carefully, and ask your doctor or other care provider to review them with you.

## 2017-10-02 NOTE — NURSING NOTE
"Chief Complaint   Patient presents with     Wellness Visit       Initial /60 (BP Location: Right arm, Patient Position: Chair, Cuff Size: Adult Large)  Pulse 72  Temp 98.1  F (36.7  C) (Tympanic)  Ht 5' 2.75\" (1.594 m)  Wt 219 lb (99.3 kg)  BMI 39.1 kg/m2 Estimated body mass index is 39.1 kg/(m^2) as calculated from the following:    Height as of this encounter: 5' 2.75\" (1.594 m).    Weight as of this encounter: 219 lb (99.3 kg).  Medication Reconciliation: complete    Health Maintenance that is potentially due pending provider review:  NONE    n/a    Is there anyone who you would like to be able to receive your results? No  If yes have patient fill out AMOR    "

## 2017-10-02 NOTE — PATIENT INSTRUCTIONS

## 2017-10-03 DIAGNOSIS — E03.9 HYPOTHYROIDISM, UNSPECIFIED TYPE: Primary | ICD-10-CM

## 2017-10-05 ENCOUNTER — OFFICE VISIT (OUTPATIENT)
Dept: FAMILY MEDICINE | Facility: CLINIC | Age: 72
End: 2017-10-05
Payer: COMMERCIAL

## 2017-10-05 ENCOUNTER — RADIANT APPOINTMENT (OUTPATIENT)
Dept: GENERAL RADIOLOGY | Facility: CLINIC | Age: 72
End: 2017-10-05
Attending: PHYSICIAN ASSISTANT
Payer: COMMERCIAL

## 2017-10-05 VITALS
HEART RATE: 60 BPM | TEMPERATURE: 97.8 F | BODY MASS INDEX: 39.1 KG/M2 | SYSTOLIC BLOOD PRESSURE: 130 MMHG | DIASTOLIC BLOOD PRESSURE: 60 MMHG | RESPIRATION RATE: 16 BRPM | WEIGHT: 219 LBS

## 2017-10-05 DIAGNOSIS — M54.50 ACUTE RIGHT-SIDED LOW BACK PAIN WITHOUT SCIATICA: Primary | ICD-10-CM

## 2017-10-05 DIAGNOSIS — M54.50 ACUTE RIGHT-SIDED LOW BACK PAIN WITHOUT SCIATICA: ICD-10-CM

## 2017-10-05 LAB
ALBUMIN UR-MCNC: NEGATIVE MG/DL
APPEARANCE UR: CLEAR
BILIRUB UR QL STRIP: NEGATIVE
COLOR UR AUTO: YELLOW
GLUCOSE UR STRIP-MCNC: NEGATIVE MG/DL
HGB UR QL STRIP: NEGATIVE
KETONES UR STRIP-MCNC: NEGATIVE MG/DL
LEUKOCYTE ESTERASE UR QL STRIP: ABNORMAL
NITRATE UR QL: NEGATIVE
PH UR STRIP: 7 PH (ref 5–7)
RBC #/AREA URNS AUTO: NORMAL /HPF
SOURCE: ABNORMAL
SP GR UR STRIP: 1.01 (ref 1–1.03)
UROBILINOGEN UR STRIP-ACNC: 0.2 EU/DL (ref 0.2–1)
WBC #/AREA URNS AUTO: NORMAL /HPF

## 2017-10-05 PROCEDURE — 81001 URINALYSIS AUTO W/SCOPE: CPT | Performed by: PHYSICIAN ASSISTANT

## 2017-10-05 PROCEDURE — 72100 X-RAY EXAM L-S SPINE 2/3 VWS: CPT

## 2017-10-05 PROCEDURE — 99214 OFFICE O/P EST MOD 30 MIN: CPT | Performed by: PHYSICIAN ASSISTANT

## 2017-10-05 RX ORDER — KETOROLAC TROMETHAMINE 10 MG/1
10 TABLET, FILM COATED ORAL EVERY 6 HOURS PRN
Qty: 20 TABLET | Refills: 0 | Status: SHIPPED | OUTPATIENT
Start: 2017-10-05 | End: 2017-12-19

## 2017-10-05 ASSESSMENT — ENCOUNTER SYMPTOMS
HEADACHES: 0
DIARRHEA: 0
DIZZINESS: 0
EYE REDNESS: 0
ABDOMINAL PAIN: 0
CONSTIPATION: 0
COUGH: 0
WEAKNESS: 0
BLURRED VISION: 0
DYSURIA: 0
DIAPHORESIS: 0
FOCAL WEAKNESS: 0
NECK PAIN: 0
DOUBLE VISION: 0
PHOTOPHOBIA: 0
BACK PAIN: 1
SPUTUM PRODUCTION: 0
SENSORY CHANGE: 0
WHEEZING: 0
EYE DISCHARGE: 0
FEVER: 0
BLOOD IN STOOL: 0
WEIGHT LOSS: 0
PALPITATIONS: 0
SEIZURES: 0
VOMITING: 0
SORE THROAT: 0
TINGLING: 0
HEMOPTYSIS: 0
MYALGIAS: 1
LOSS OF CONSCIOUSNESS: 0
ORTHOPNEA: 0
HEARTBURN: 0
NAUSEA: 0
DEPRESSION: 0
NEUROLOGICAL NEGATIVE: 1
NERVOUS/ANXIOUS: 0
SHORTNESS OF BREATH: 0
HALLUCINATIONS: 0
EYE PAIN: 0
INSOMNIA: 0
FREQUENCY: 0

## 2017-10-05 ASSESSMENT — LIFESTYLE VARIABLES: SUBSTANCE_ABUSE: 0

## 2017-10-05 ASSESSMENT — PAIN SCALES - GENERAL: PAINLEVEL: MODERATE PAIN (4)

## 2017-10-05 NOTE — MR AVS SNAPSHOT
After Visit Summary   10/5/2017    Gail Dewitt    MRN: [id number]           Patient Information     Date Of Birth          1945        Visit Information        Provider Department      10/5/2017 10:20 AM Luan Rader PA-C [facility]        Today's Diagnoses     Acute right-sided low back pain without sciatica    -  1       Follow-ups after your visit        Follow-up notes from your care team     Return if symptoms worsen or fail to improve.      Your next 10 appointments already scheduled     Oct 18, 2017  9:15 AM CDT   LAB with NB LAB   [facility] ([facility])    [address] [place] [id number]   [phone]           Patient must bring picture ID. Patient should be prepared to give a urine specimen  Please do not eat 10-12 hours before your appointment if you are coming in fasting for labs on lipids, cholesterol, or glucose (sugar). Pregnant women should follow their Care Team instructions. Water with medications is okay. Do not drink coffee or other fluids. If you have concerns about taking  your medications, please ask at office or if scheduling via [employer], send a message by clicking on Secure Messaging, Message Your Care Team.              Who to contact     If you have questions or need follow up information about today's clinic visit or your schedule please contact [facility] directly at [phone].  Normal or non-critical lab and imaging results will be communicated to you by [employer], letter or phone within 4 business days after the clinic has received the results. If you do not hear from us within 7 days, please contact the clinic through Heyday[employer] or phone. If you have a critical or abnormal lab result, we will notify you by phone as soon as possible.  Submit refill requests through [employer] or call your pharmacy and they will forward the refill request to us. Please allow 3 business days for  your refill to be completed.          Additional Information About Your Visit        ClearSlide[employer] Information     [employer] gives you secure access to your electronic health record. If you see a primary care provider, you can also send messages to your care team and make appointments. If you have questions, please call your primary care clinic.  If you do not have a primary care provider, please call [phone] and they will assist you.        Care EveryWhere ID     This is your Care EveryWhere ID. This could be used by other organizations to access your [facility] medical records  [id number]        Your Vitals Were     Pulse Temperature Respirations BMI (Body Mass Index)          60 97.8  F (36.6  C) (Tympanic) 16 39.1 kg/m2         Blood Pressure from Last 3 Encounters:   10/05/17 130/60   10/02/17 130/60   06/20/17 126/68    Weight from Last 3 Encounters:   10/05/17 219 lb (99.3 kg)   10/02/17 219 lb (99.3 kg)   04/19/17 215 lb 6.4 oz (97.7 kg)              We Performed the Following     UA reflex to Microscopic and Culture     Urine Microscopic          Today's Medication Changes          These changes are accurate as of: 10/5/17 11:35 AM.  If you have any questions, ask your nurse or doctor.               Start taking these medicines.        Dose/Directions    ketorolac 10 MG tablet   Commonly known as:  TORADOL   Used for:  Acute right-sided low back pain without sciatica   Started by:  Luan Rader PA-C        Dose:  10 mg   Take 1 tablet (10 mg) by mouth every 6 hours as needed   Quantity:  20 tablet   Refills:  0            Where to get your medicines      These medications were sent to [id number] [address] [facility]  [address] [facility] [address]    Hours:  Closed 10-16-08 business to [facility] Phone:  [phone]     ketorolac 10 MG tablet                Primary Care Provider Office Phone # Fax #    Aliyah Aggarwal [phone] [facility] [address]        Equal Access to Services     BRANDI SARAVIA : Hadii aad ku hadmichellege Barrera, waefremda svitlanakrunalha, qaagta joselinmamaria del carmen rodgers. So [facility] [place] [facility] [phone].    ATENCIÓN: Si habla español, tiene a cates disposición servicios gratuitos de asistencia lingüística. [facility] [phone].    We comply with applicable federal civil rights laws and [place] laws. We do not discriminate on the basis of race, color, national origin, age, disability, sex, sexual orientation, or gender identity.            Thank you!     Thank you for choosing [facility]  for your care. Our goal is always to provide you with excellent care. Hearing back from our patients is one way we can continue to improve our services. Please take a few minutes to complete the written survey that you may receive in the mail after your visit with us. Thank you!             Your Updated Medication List - Protect others around you: Learn how to safely use, store and throw away your medicines at www.disposemymeds.org.          This list is accurate as of: 10/5/17 11:35 AM.  Always use your most recent med list.                   Brand Name Dispense Instructions for use Diagnosis    aspirin 81 MG tablet     100    1 TABLET BY MOUTH DAILY    Essential hypertension, benign       CALCIUM 1200 PO      Take 1 tablet by mouth daily        cycloSPORINE 0.05 % ophthalmic emulsion    RESTASIS     Place 1 drop into both eyes 2 times daily        ketorolac 10 MG tablet    TORADOL    20 tablet    Take 1 tablet (10 mg) by mouth every 6 hours as needed    Acute right-sided low back pain without sciatica       levothyroxine 112 MCG tablet    SYNTHROID/LEVOTHROID    90 tablet    Take 1 tablet (112 mcg) by mouth daily    Acquired hypothyroidism       losartan 50 MG tablet    COZAAR    90 tablet    Take 1 tablet (50 mg) by mouth daily    Benign essential hypertension       nabumetone 750 MG tablet     RELAFEN    30 tablet    Take 1 tablet (750 mg) by mouth 2 times daily as needed for moderate pain    Acute pain of right knee       OMEGA 3 PO      Take by mouth daily        propylene glycol 0.6 % Soln ophthalmic solution    SYSTANE BALANCE     1 drop        ranitidine 150 MG tablet    ZANTAC    60 tablet    Take 1 tablet (150 mg) by mouth 2 times daily    Gastroesophageal reflux disease without esophagitis       TYLENOL PM EXTRA STRENGTH PO      Take by mouth At Bedtime    Pain of left lower extremity       vitamin D 2000 UNITS Caps      Take  by mouth daily.

## 2017-10-05 NOTE — PROGRESS NOTES
HPI    SUBJECTIVE:   Gail Dewitt is a 72 year old female who presents to clinic today for back pain that began 3 days ago. She points to the right lower back area and states that this does not radiate down her leg. She states that her symptoms increase as the day progresses. She denies any bowel or bladder control issues.    Back Pain       Duration: Since Tuesday morning         Specific cause: none    Description:   Location of pain: low back right  Character of pain: sharp, dull ache and constant  Pain radiation:none  New numbness or weakness in legs, not attributed to pain:  no     Intensity: moderate    History:   Pain interferes with job: Not applicable  History of back problems: no prior back problems  Any previous MRI or X-rays: None  Sees a specialist for back pain:  No  Therapies tried without relief: none    Alleviating factors:   Improved by: Laying down       Precipitating factors:  Worsened by: Walking    Functional and Psychosocial Screen (Private Outlet STarT Back):      Not performed today                Problem list and histories reviewed & adjusted, as indicated.  Additional history: as documented    Patient Active Problem List   Diagnosis     Hypothyroidism     Other psoriasis     TIBIAL MUSCULAR ATROPHY     Microscopic Hematuria     CARDIOVASCULAR SCREENING; LDL GOAL LESS THAN 130     Cold sore     GERD (gastroesophageal reflux disease)     Hip pain     Senile nuclear sclerosis     Family history of breast cancer     Advanced directives, counseling/discussion     Left shoulder pain     Benign essential hypertension     Chronic left shoulder pain     Past Surgical History:   Procedure Laterality Date     COLPORRHAPHY ANTERIOR  12/6/2010    COLPORRHAPHY ANTERIOR performed by MAY RIVERA at [facility]     CYSTOCELE REPAIR  2007     HYSTERECTOMY, JOSEE  1978    Hysterectomy     INJECT EPIDURAL LUMBAR  10/19/2011    Procedure:INJECT EPIDURAL LUMBAR; DESHAWN-; Surgeon:GENERIC ANESTHESIA PROVIDER;  Location:[facility]     INJECT JOINT SACROILIAC  2/7/2011    INJECT JOINT SACROILIAC performed by GENERIC ANESTHESIA PROVIDER at [facility]     INJECT JOINT SACROILIAC  4/25/2012    Procedure:INJECT JOINT SACROILIAC; DESHAWN SI Joint --; Surgeon:GENERIC ANESTHESIA PROVIDER; Location:[facility]     PHACOEMULSIFICATION WITH STANDARD INTRAOCULAR LENS IMPLANT  5/26/2011    Procedure:PHACOEMULSIFICATION WITH STANDARD INTRAOCULAR LENS IMPLANT; Surgeon:JOSÉ MANUEL DORSEY; Location:[facility]     PHACOEMULSIFICATION WITH STANDARD INTRAOCULAR LENS IMPLANT  6/9/2011    Procedure:PHACOEMULSIFICATION WITH STANDARD INTRAOCULAR LENS IMPLANT; Surgeon:JOSÉ MANUEL DORSEY; Location:[facility]     RECTOCELE REPAIR  2007     SURGICAL HISTORY OF -   1948    tonsillectomy and adenoidectomy     SURGICAL HISTORY OF -   1965    appendectomy and right ovarian cyst     SURGICAL HISTORY OF -   1970    right breast biopsy     SURGICAL HISTORY OF -       bilateral foot surgery; subtalar joint fusion; bunion     SURGICAL HISTORY OF -   2009    Posterior colporrhaphy w/ posterior Pro-Lift       Social History   Substance Use Topics     Smoking status: Former Smoker     Years: 20.00     Quit date: 9/23/1985     Smokeless tobacco: Never Used     Alcohol use Yes      Comment: 1 Q 1-2 weeks     Family History   Problem Relation Age of Onset     Cardiovascular Brother      Hypertension Brother      Thyroid Disease Brother      Cardiovascular Paternal Grandmother      HEART DISEASE Paternal Grandmother      RIP MI age 71     Cardiovascular Paternal Grandfather      C.A.D. Mother      Hypertension Mother      Breast Cancer Mother      Alcohol/Drug Mother      Allergies Mother      Depression Mother      OSTEOPOROSIS Mother      Breast Cancer Maternal Grandmother      Breast Cancer Sister      OSTEOPOROSIS Sister      Thyroid Disease Father      Cardiovascular Maternal Grandfather      HEART DISEASE Maternal Grandfather      RIP MI, age 81     Neurologic Disorder Son       Obesity Daughter      GASTROINTESTINAL DISEASE Daughter      gallbladder surg     Thyroid Disease Son          Current Outpatient Prescriptions   Medication Sig Dispense Refill     ketorolac (TORADOL) 10 MG tablet Take 1 tablet (10 mg) by mouth every 6 hours as needed 20 tablet 0     levothyroxine (SYNTHROID/LEVOTHROID) 112 MCG tablet Take 1 tablet (112 mcg) by mouth daily 90 tablet 3     losartan (COZAAR) 50 MG tablet Take 1 tablet (50 mg) by mouth daily 90 tablet 3     ranitidine (ZANTAC) 150 MG tablet Take 1 tablet (150 mg) by mouth 2 times daily 60 tablet 1     Omega-3 Fatty Acids (OMEGA 3 PO) Take by mouth daily       propylene glycol (SYSTANE BALANCE) 0.6 % SOLN ophthalmic solution 1 drop       nabumetone (RELAFEN) 750 MG tablet Take 1 tablet (750 mg) by mouth 2 times daily as needed for moderate pain 30 tablet 1     Calcium Carbonate-Vit D-Min (CALCIUM 1200 PO) Take 1 tablet by mouth daily       Diphenhydramine-APAP, sleep, (TYLENOL PM EXTRA STRENGTH PO) Take by mouth At Bedtime       cycloSPORINE (RESTASIS) 0.05 % ophthalmic emulsion Place 1 drop into both eyes 2 times daily       Cholecalciferol (VITAMIN D) 2000 UNITS CAPS Take  by mouth daily.       ASPIRIN 81 MG OR TABS 1 TABLET BY MOUTH DAILY 100 3     Allergies   Allergen Reactions     Ace Inhibitors Cough     Amlodipine Besylate      Swellings       Dilaudid [Hydromorphone Hcl] Hives     Percocet [Oxycodone-Acetaminophen] Other (See Comments)     Severe dizziness       Labs reviewed in EPIC      Reviewed and updated as needed this visit by clinical staffTobacco  Allergies  Meds  Med Hx  Surg Hx  Fam Hx  Soc Hx      Reviewed and updated as needed this visit by Provider             Review of Systems   Constitutional: Negative for diaphoresis, fever, malaise/fatigue and weight loss.   HENT: Negative for congestion, ear discharge, ear pain, hearing loss, nosebleeds and sore throat.    Eyes: Negative for blurred vision, double vision, photophobia,  pain, discharge and redness.   Respiratory: Negative for cough, hemoptysis, sputum production, shortness of breath and wheezing.    Cardiovascular: Negative for chest pain, palpitations, orthopnea and leg swelling.   Gastrointestinal: Negative for abdominal pain, blood in stool, constipation, diarrhea, heartburn, melena, nausea and vomiting.   Genitourinary: Negative.  Negative for dysuria, frequency and urgency.   Musculoskeletal: Positive for back pain and myalgias. Negative for joint pain and neck pain.   Skin: Negative for itching and rash.   Neurological: Negative.  Negative for dizziness, tingling, sensory change, focal weakness, seizures, loss of consciousness, weakness and headaches.   Endo/Heme/Allergies: Negative.    Psychiatric/Behavioral: Negative for depression, hallucinations, substance abuse and suicidal ideas. The patient is not nervous/anxious and does not have insomnia.          Physical Exam   Constitutional: She is oriented to person, place, and time and well-developed, well-nourished, and in no distress. No distress.   HENT:   Head: Normocephalic and atraumatic.   Right Ear: External ear normal.   Left Ear: External ear normal.   Nose: Nose normal.   Eyes: Conjunctivae and EOM are normal. Pupils are equal, round, and reactive to light. Right eye exhibits no discharge. Left eye exhibits no discharge. No scleral icterus.   Neck: Normal range of motion. Neck supple. No JVD present. No tracheal deviation present. No thyromegaly present.   Cardiovascular: Normal rate, regular rhythm, normal heart sounds and intact distal pulses.  Exam reveals no gallop and no friction rub.    No murmur heard.  Pulmonary/Chest: Effort normal and breath sounds normal. No stridor. No respiratory distress. She has no wheezes. She has no rales. She exhibits no tenderness.   Abdominal: Soft. Bowel sounds are normal. She exhibits no distension and no mass. There is no tenderness. There is no rebound and no guarding.    Musculoskeletal: She exhibits no edema.        Lumbar back: She exhibits decreased range of motion, tenderness, pain and spasm.   Lymphadenopathy:     She has no cervical adenopathy.   Neurological: She is alert and oriented to person, place, and time. She has normal reflexes. No cranial nerve deficit. She exhibits normal muscle tone. Gait normal.   Skin: Skin is warm and dry. No rash noted. She is not diaphoretic. No erythema. No pallor.   Psychiatric: Mood, memory, affect and judgment normal.       (M54.5) Acute right-sided low back pain without sciatica  (primary encounter diagnosis)  Comment:   Plan: UA reflex to Microscopic and Culture, XR Lumbar        Spine 2/3 Views, Urine Microscopic, ketorolac         (TORADOL) 10 MG tablet           X-rays were obtained which showed no definite abnormalities but there is some degenerative changes present. Urinalysis showed no hematuria.   At this time I recommended symptomatic measures including Toradol and if symptoms are not improving over the next 3-5 days, follow up. We will consider further imaging if needed.

## 2017-10-05 NOTE — NURSING NOTE
"Chief Complaint   Patient presents with     Back Pain       Initial /60 (BP Location: Right arm, Patient Position: Chair, Cuff Size: Adult Regular)  Pulse 60  Temp 97.8  F (36.6  C) (Tympanic)  Resp 16  Wt 219 lb (99.3 kg)  BMI 39.1 kg/m2 Estimated body mass index is 39.1 kg/(m^2) as calculated from the following:    Height as of 10/2/17: 5' 2.75\" (1.594 m).    Weight as of this encounter: 219 lb (99.3 kg).  Medication Reconciliation: complete    Health Maintenance that is potentially due pending provider review:  NONE    n/a    Is there anyone who you would like to be able to receive your results? Yes  If yes have patient fill out AMOR      "

## 2017-10-18 DIAGNOSIS — E03.9 HYPOTHYROIDISM, UNSPECIFIED TYPE: ICD-10-CM

## 2017-10-18 LAB
ALBUMIN SERPL-MCNC: 3.4 G/DL (ref 3.4–5)
ALP SERPL-CCNC: 339 U/L (ref 40–150)
ALT SERPL W P-5'-P-CCNC: 45 U/L (ref 0–50)
AST SERPL W P-5'-P-CCNC: 38 U/L (ref 0–45)
BILIRUB DIRECT SERPL-MCNC: 0.1 MG/DL (ref 0–0.2)
BILIRUB SERPL-MCNC: 0.4 MG/DL (ref 0.2–1.3)
PROT SERPL-MCNC: 7.7 G/DL (ref 6.8–8.8)

## 2017-10-18 PROCEDURE — 80076 HEPATIC FUNCTION PANEL: CPT | Performed by: FAMILY MEDICINE

## 2017-10-18 PROCEDURE — 36415 COLL VENOUS BLD VENIPUNCTURE: CPT | Performed by: FAMILY MEDICINE

## 2017-10-20 DIAGNOSIS — R74.8 ALKALINE PHOSPHATASE ELEVATION: ICD-10-CM

## 2017-10-20 DIAGNOSIS — I10 BENIGN ESSENTIAL HYPERTENSION: Primary | ICD-10-CM

## 2017-10-24 DIAGNOSIS — I10 BENIGN ESSENTIAL HYPERTENSION: ICD-10-CM

## 2017-10-24 DIAGNOSIS — R74.8 ALKALINE PHOSPHATASE ELEVATION: ICD-10-CM

## 2017-10-24 LAB
ALP SERPL-CCNC: 313 U/L (ref 40–150)
GGT SERPL-CCNC: 272 U/L (ref 0–40)

## 2017-10-24 PROCEDURE — 82977 ASSAY OF GGT: CPT | Performed by: FAMILY MEDICINE

## 2017-10-24 PROCEDURE — 36415 COLL VENOUS BLD VENIPUNCTURE: CPT | Performed by: FAMILY MEDICINE

## 2017-10-24 PROCEDURE — 84075 ASSAY ALKALINE PHOSPHATASE: CPT | Performed by: FAMILY MEDICINE

## 2017-10-25 ENCOUNTER — MYC MEDICAL ADVICE (OUTPATIENT)
Dept: FAMILY MEDICINE | Facility: CLINIC | Age: 72
End: 2017-10-25

## 2017-10-26 DIAGNOSIS — R74.8 ELEVATED LIVER ENZYMES: Primary | ICD-10-CM

## 2017-10-30 ENCOUNTER — HOSPITAL ENCOUNTER (OUTPATIENT)
Dept: ULTRASOUND IMAGING | Facility: CLINIC | Age: 72
Discharge: HOME | End: 2017-10-30
Attending: FAMILY MEDICINE | Admitting: FAMILY MEDICINE
Payer: MEDICARE

## 2017-10-30 DIAGNOSIS — R74.8 ELEVATED LIVER ENZYMES: ICD-10-CM

## 2017-10-30 PROCEDURE — 76705 ECHO EXAM OF ABDOMEN: CPT

## 2017-10-31 ENCOUNTER — MYC MEDICAL ADVICE (OUTPATIENT)
Dept: FAMILY MEDICINE | Facility: CLINIC | Age: 72
End: 2017-10-31

## 2017-10-31 ENCOUNTER — TELEPHONE (OUTPATIENT)
Dept: FAMILY MEDICINE | Facility: CLINIC | Age: 72
End: 2017-10-31

## 2017-10-31 DIAGNOSIS — K76.0 FATTY LIVER: Primary | ICD-10-CM

## 2017-10-31 DIAGNOSIS — R74.8 ELEVATED ALKALINE PHOSPHATASE LEVEL: ICD-10-CM

## 2017-10-31 NOTE — TELEPHONE ENCOUNTER
Phone number provided for the [facility] at [facility] says she forgot to ask Dr Aggarwal if there is anything she should be doing with her diet in the meantime.  Her cell [phone]

## 2017-10-31 NOTE — TELEPHONE ENCOUNTER
Pt called requesting results of ultrasound yesterday.  Pt is leaving for 9 days out of town 11/1 and wants to know the plan.    Arcelia [place], [place]

## 2017-11-21 DIAGNOSIS — K76.0 FATTY LIVER: ICD-10-CM

## 2017-11-21 LAB
ALBUMIN SERPL-MCNC: 3.5 G/DL (ref 3.4–5)
ALP SERPL-CCNC: 326 U/L (ref 40–150)
ALT SERPL W P-5'-P-CCNC: 40 U/L (ref 0–50)
AST SERPL W P-5'-P-CCNC: 37 U/L (ref 0–45)
BILIRUB DIRECT SERPL-MCNC: 0.1 MG/DL (ref 0–0.2)
BILIRUB SERPL-MCNC: 0.5 MG/DL (ref 0.2–1.3)
GGT SERPL-CCNC: 244 U/L (ref 0–40)
PROT SERPL-MCNC: 7.6 G/DL (ref 6.8–8.8)

## 2017-11-21 PROCEDURE — 36415 COLL VENOUS BLD VENIPUNCTURE: CPT | Performed by: FAMILY MEDICINE

## 2017-11-21 PROCEDURE — 80076 HEPATIC FUNCTION PANEL: CPT | Performed by: FAMILY MEDICINE

## 2017-11-21 PROCEDURE — 82977 ASSAY OF GGT: CPT | Performed by: FAMILY MEDICINE

## 2017-12-19 ENCOUNTER — OFFICE VISIT (OUTPATIENT)
Dept: GASTROENTEROLOGY | Facility: CLINIC | Age: 72
End: 2017-12-19
Attending: INTERNAL MEDICINE
Payer: MEDICARE

## 2017-12-19 VITALS
TEMPERATURE: 98 F | OXYGEN SATURATION: 97 % | HEIGHT: 63 IN | BODY MASS INDEX: 38.09 KG/M2 | DIASTOLIC BLOOD PRESSURE: 78 MMHG | SYSTOLIC BLOOD PRESSURE: 133 MMHG | HEART RATE: 82 BPM | WEIGHT: 215 LBS

## 2017-12-19 DIAGNOSIS — R74.8 ELEVATED ALKALINE PHOSPHATASE LEVEL: ICD-10-CM

## 2017-12-19 DIAGNOSIS — K76.0 FATTY LIVER: ICD-10-CM

## 2017-12-19 DIAGNOSIS — E03.9 HYPOTHYROIDISM, UNSPECIFIED TYPE: ICD-10-CM

## 2017-12-19 LAB
ALBUMIN SERPL-MCNC: 3.2 G/DL (ref 3.4–5)
ALP SERPL-CCNC: 273 U/L (ref 40–150)
ALT SERPL W P-5'-P-CCNC: 32 U/L (ref 0–50)
ANION GAP SERPL CALCULATED.3IONS-SCNC: 8 MMOL/L (ref 3–14)
AST SERPL W P-5'-P-CCNC: 29 U/L (ref 0–45)
BILIRUB DIRECT SERPL-MCNC: <0.1 MG/DL (ref 0–0.2)
BILIRUB SERPL-MCNC: 0.4 MG/DL (ref 0.2–1.3)
BUN SERPL-MCNC: 14 MG/DL (ref 7–30)
CALCIUM SERPL-MCNC: 8.2 MG/DL (ref 8.5–10.1)
CHLORIDE SERPL-SCNC: 104 MMOL/L (ref 94–109)
CO2 SERPL-SCNC: 25 MMOL/L (ref 20–32)
CREAT SERPL-MCNC: 0.66 MG/DL (ref 0.52–1.04)
ERYTHROCYTE [DISTWIDTH] IN BLOOD BY AUTOMATED COUNT: 14.6 % (ref 10–15)
GFR SERPL CREATININE-BSD FRML MDRD: 88 ML/MIN/1.7M2
GLUCOSE SERPL-MCNC: 112 MG/DL (ref 70–99)
HAV IGG SER QL IA: REACTIVE
HBV CORE AB SERPL QL IA: NONREACTIVE
HBV SURFACE AB SERPL IA-ACNC: 5.02 M[IU]/ML
HBV SURFACE AG SERPL QL IA: NONREACTIVE
HCT VFR BLD AUTO: 37.6 % (ref 35–47)
HCV AB SERPL QL IA: NONREACTIVE
HGB BLD-MCNC: 12 G/DL (ref 11.7–15.7)
INR PPP: 0.99 (ref 0.86–1.14)
MCH RBC QN AUTO: 28.1 PG (ref 26.5–33)
MCHC RBC AUTO-ENTMCNC: 31.9 G/DL (ref 31.5–36.5)
MCV RBC AUTO: 88 FL (ref 78–100)
PLATELET # BLD AUTO: 224 10E9/L (ref 150–450)
POTASSIUM SERPL-SCNC: 3.9 MMOL/L (ref 3.4–5.3)
PROT SERPL-MCNC: 7.6 G/DL (ref 6.8–8.8)
RBC # BLD AUTO: 4.27 10E12/L (ref 3.8–5.2)
SODIUM SERPL-SCNC: 137 MMOL/L (ref 133–144)
T4 FREE SERPL-MCNC: 1.33 NG/DL (ref 0.76–1.46)
TSH SERPL DL<=0.005 MIU/L-ACNC: 8.11 MU/L (ref 0.4–4)
WBC # BLD AUTO: 5.7 10E9/L (ref 4–11)

## 2017-12-19 PROCEDURE — 80076 HEPATIC FUNCTION PANEL: CPT | Performed by: INTERNAL MEDICINE

## 2017-12-19 PROCEDURE — 85610 PROTHROMBIN TIME: CPT | Performed by: INTERNAL MEDICINE

## 2017-12-19 PROCEDURE — 86803 HEPATITIS C AB TEST: CPT | Performed by: INTERNAL MEDICINE

## 2017-12-19 PROCEDURE — 80048 BASIC METABOLIC PNL TOTAL CA: CPT | Performed by: INTERNAL MEDICINE

## 2017-12-19 PROCEDURE — 82784 ASSAY IGA/IGD/IGG/IGM EACH: CPT | Performed by: INTERNAL MEDICINE

## 2017-12-19 PROCEDURE — 86704 HEP B CORE ANTIBODY TOTAL: CPT | Performed by: INTERNAL MEDICINE

## 2017-12-19 PROCEDURE — 36415 COLL VENOUS BLD VENIPUNCTURE: CPT | Performed by: INTERNAL MEDICINE

## 2017-12-19 PROCEDURE — 85027 COMPLETE CBC AUTOMATED: CPT | Performed by: INTERNAL MEDICINE

## 2017-12-19 PROCEDURE — 99212 OFFICE O/P EST SF 10 MIN: CPT | Mod: ZF

## 2017-12-19 PROCEDURE — 86706 HEP B SURFACE ANTIBODY: CPT | Performed by: INTERNAL MEDICINE

## 2017-12-19 PROCEDURE — 83516 IMMUNOASSAY NONANTIBODY: CPT | Performed by: INTERNAL MEDICINE

## 2017-12-19 PROCEDURE — 87340 HEPATITIS B SURFACE AG IA: CPT | Performed by: INTERNAL MEDICINE

## 2017-12-19 PROCEDURE — 86708 HEPATITIS A ANTIBODY: CPT | Performed by: INTERNAL MEDICINE

## 2017-12-19 ASSESSMENT — PAIN SCALES - GENERAL: PAINLEVEL: NO PAIN (0)

## 2017-12-19 NOTE — LETTER
12/19/2017       RE: Gail Dewitt  [id number] [facility] [id number]     Dear Colleague,    Thank you for referring your patient, Gail Dewitt, to the [facility] HEPATOLOGY at [facility]. Please see a copy of my visit note below.    [facility]    Hepatology New Patient Visit    Referring provider:  Aliyah Dewitt Alessia      72 year old female    Chief complaint:  Abnormal liver function tests    HPI:  The patient comes to clinic this morning with her  for further evaluation and management of elevated alkaline phosphatase.  This was first identified in the fall 2017.  On review of Epic records, it has been present since 2009.  The patient had an ultrasound in October which showed increased echogenicity, but no evidence of biliary obstruction.      Patient is doing well overall.  She denies any signs or symptoms specific to liver disease.      The patient denies itch, rash, jaundice, abdominal distention, lower extremity edema, lethargy or confusion.      No history of melena, hematemesis or hematochezia.      The patient denies any fevers, sweats or chills.  Weight has been stable.      The patient denies any recent new medications other than ranitidine for the past 2 months for GERD.  The patient denies any antibiotics over the last 6-12 months.  The patient denies any herbal medications or remedies.     Family history of sister with PBC noted.     The patient drinks alcohol.  She drinks 1 wine cooler per month.  She denies any history of alcohol abuse.      The patient is an ex-smoker.  She quit in 1980.  Prior to that, she was smoking a half pack per day for 15 years.  The patient denies any history of recreational drug use including marijuana, intranasal or intravenous drugs.     Medical hx Surgical hx   Past Medical History:   Diagnosis Date     Dyslipidemia      Eczema      Need for prophylactic hormone replacement  therapy (postmenopausal)      Obesity      Unspecified essential hypertension      Unspecified hypothyroidism       Past Surgical History:   Procedure Laterality Date     APPENDECTOMY       BREAST BIOPSY, RT/LT Right 1970     COLPORRHAPHY ANTERIOR  12/6/2010    COLPORRHAPHY ANTERIOR performed by MAY RIVERA at [facility]     CYSTOCELE REPAIR  2007     HYSTERECTOMY, JOSEE  1978    Hysterectomy     INJECT EPIDURAL LUMBAR  10/19/2011    Procedure:INJECT EPIDURAL LUMBAR; DESHAWN-; Surgeon:GENERIC ANESTHESIA PROVIDER; Location:[facility]     INJECT JOINT SACROILIAC  2/7/2011    INJECT JOINT SACROILIAC performed by GENERIC ANESTHESIA PROVIDER at [facility]     INJECT JOINT SACROILIAC  4/25/2012    Procedure:INJECT JOINT SACROILIAC; DESHAWN SI Joint --; Surgeon:GENERIC ANESTHESIA PROVIDER; Location:[facility]     PHACOEMULSIFICATION WITH STANDARD INTRAOCULAR LENS IMPLANT  5/26/2011    Procedure:PHACOEMULSIFICATION WITH STANDARD INTRAOCULAR LENS IMPLANT; Surgeon:JOSÉ MANUEL DORSEY; Location:[facility]     PHACOEMULSIFICATION WITH STANDARD INTRAOCULAR LENS IMPLANT  6/9/2011    Procedure:PHACOEMULSIFICATION WITH STANDARD INTRAOCULAR LENS IMPLANT; Surgeon:JOSÉ MANUEL DORSEY; Location:[facility]     RECTOCELE REPAIR  2007     SURGICAL HISTORY OF -       bilateral foot surgery; subtalar joint fusion; bunion     SURGICAL HISTORY OF -   2009    Posterior colporrhaphy w/ posterior Pro-Lift     TONSILLECTOMY            Medications  Prior to Admission medications    Medication Sig Start Date End Date Taking? Authorizing Provider   levothyroxine (SYNTHROID/LEVOTHROID) 112 MCG tablet Take 1 tablet (112 mcg) by mouth daily 10/2/17  Yes Aliyah Aggarwal MD   losartan (COZAAR) 50 MG tablet Take 1 tablet (50 mg) by mouth daily 10/2/17  Yes Aliyah Aggarwal MD   ranitidine (ZANTAC) 150 MG tablet Take 1 tablet (150 mg) by mouth 2 times daily 10/2/17  Yes Aliyah Aggarwal MD   Omega-3 Fatty Acids (OMEGA 3 PO) Take by mouth daily   Yes  Reported, Patient   propylene glycol (SYSTANE BALANCE) 0.6 % SOLN ophthalmic solution 1 drop   Yes Reported, Patient   nabumetone (RELAFEN) 750 MG tablet Take 1 tablet (750 mg) by mouth 2 times daily as needed for moderate pain 6/20/17  Yes Luan Rader, BABAR   Calcium Carbonate-Vit D-Min (CALCIUM 1200 PO) Take 1 tablet by mouth daily   Yes Reported, Patient   Diphenhydramine-APAP, sleep, (TYLENOL PM EXTRA STRENGTH PO) Take by mouth At Bedtime   Yes Reported, Patient   cycloSPORINE (RESTASIS) 0.05 % ophthalmic emulsion Place 1 drop into both eyes 2 times daily   Yes Reported, Patient   Cholecalciferol (VITAMIN D) 2000 UNITS CAPS Take  by mouth daily.   Yes Reported, Patient   ASPIRIN 81 MG OR TABS 1 TABLET BY MOUTH DAILY 1/8/07  Yes Sara Jordan MD       Allergies  Allergies   Allergen Reactions     Ace Inhibitors Cough     Amlodipine Besylate      Swellings       Dilaudid [Hydromorphone Hcl] Hives     Percocet [Oxycodone-Acetaminophen] Other (See Comments)     Severe dizziness         Family hx Social hx   Family History   Problem Relation Age of Onset     Cardiovascular Brother      Hypertension Brother      Thyroid Disease Brother      Cardiovascular Paternal Grandmother      HEART DISEASE Paternal Grandmother      RIP MI age 71     Cardiovascular Paternal Grandfather      C.A.D. Mother      Hypertension Mother      Breast Cancer Mother      Alcohol/Drug Mother      Allergies Mother      Depression Mother      OSTEOPOROSIS Mother      Breast Cancer Maternal Grandmother      Breast Cancer Sister      OSTEOPOROSIS Sister      Liver Disease Sister      PBC     Thyroid Disease Father      Cardiovascular Maternal Grandfather      HEART DISEASE Maternal Grandfather      RIP MI, age 81     Neurologic Disorder Son      Obesity Daughter      GASTROINTESTINAL DISEASE Daughter      gallbladder surg     Thyroid Disease Son       Social History   Substance Use Topics     Smoking status: Former Smoker     Years:  "20.00     Quit date: 9/23/1985     Smokeless tobacco: Never Used     Alcohol use Yes      Comment: 1 Q 1-2 weeks     Lives in [place] with .  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     Review of systems  A 10-point review of systems was negative.    Examination  /78  Pulse 82  Temp 98  F (36.7  C) (Oral)  Ht 1.594 m (5' 2.75\")  Wt 97.5 kg (215 lb)  SpO2 97%  BMI 38.39 kg/m2  Body mass index is 38.39 kg/(m^2).    Gen- well, NAD, A+Ox3, normal color  Eye- EOMI  ENT- MMM, normal oropharynx  Lym- no palpable lymphadenopathy  CVS- S1, S2 normal, no added sounds, RRR  RS- CTA  Abd- obese, SNT, no ascites or organomegaly on palpation or percussion, BS+  Extr- pulses good, no YUMI  MS- hands normal- no clubbing  Neuro- A+Ox3, no asterixis  Skin- no rash or jaundice  Psych- normal mood    Laboratory  Lab Results   Component Value Date     10/02/2017    POTASSIUM 4.2 10/02/2017    CHLORIDE 104 10/02/2017    CO2 28 10/02/2017    BUN 23 10/02/2017    CR 0.67 10/02/2017       Lab Results   Component Value Date    BILITOTAL 0.5 11/21/2017    ALT 40 11/21/2017    AST 37 11/21/2017    ALKPHOS 326 11/21/2017       Lab Results   Component Value Date    ALBUMIN 3.5 11/21/2017    PROTTOTAL 7.6 11/21/2017        Lab Results   Component Value Date    WBC 6.5 10/02/2017    HGB 12.4 10/02/2017    MCV 88 10/02/2017     10/02/2017       No results found for: INR    Radiology  Abd U/S Oct 2017 reviewed    Assessment  72-year-old female with a history of hypothyroidism who presents for evaluation of abnormal alkaline phosphatase.  Differential diagnosis includes primary biliary cholangitis, particularly in the context of the patient's family history.  Early stage primary biliary cholangitis can sometimes have the radiographic appearance of hepatic steatosis on ultrasound.  No clinical, biochemical or radiographic evidence of cirrhosis.  Will obtain an anti-mitochondrial " antibody to rule in out/out PBC in addition to other serologies to rule out other etiologies of chronic liver disease.      We discussed the pathophysiology, natural history and treatment of primary biliary cholangitis and non-alcoholic fatty liver disease.     Plan  1.  Check LFTs, BMP, CBC, INR  2.  Check AMA, IgM  3.  Check HAV Ab, HBV SAg, HBV SAb, HBV CAb, HCV Ab  4.  Follow-up in 6 months    Dereck Aguilar MD  Hepatology  [facility]

## 2017-12-19 NOTE — PROGRESS NOTES
[facility]    Hepatology New Patient Visit    Referring provider:  Aliyah Dewitt Cheryeduardo      72 year old female    Chief complaint:  Abnormal liver function tests    HPI:  The patient comes to clinic this morning with her  for further evaluation and management of elevated alkaline phosphatase.  This was first identified in the fall 2017.  On review of Epic records, it has been present since 2009.  The patient had an ultrasound in October which showed increased echogenicity, but no evidence of biliary obstruction.      Patient is doing well overall.  She denies any signs or symptoms specific to liver disease.      The patient denies itch, rash, jaundice, abdominal distention, lower extremity edema, lethargy or confusion.      No history of melena, hematemesis or hematochezia.      The patient denies any fevers, sweats or chills.  Weight has been stable.      The patient denies any recent new medications other than ranitidine for the past 2 months for GERD.  The patient denies any antibiotics over the last 6-12 months.  The patient denies any herbal medications or remedies.     Family history of sister with PBC noted.     The patient drinks alcohol.  She drinks 1 wine cooler per month.  She denies any history of alcohol abuse.      The patient is an ex-smoker.  She quit in 1980.  Prior to that, she was smoking a half pack per day for 15 years.  The patient denies any history of recreational drug use including marijuana, intranasal or intravenous drugs.     Medical hx Surgical hx   Past Medical History:   Diagnosis Date     Dyslipidemia      Eczema      Need for prophylactic hormone replacement therapy (postmenopausal)      Obesity      Unspecified essential hypertension      Unspecified hypothyroidism       Past Surgical History:   Procedure Laterality Date     APPENDECTOMY       BREAST BIOPSY, RT/LT Right 1970     COLPORRHAPHY ANTERIOR  12/6/2010    COLPORRHAPHY ANTERIOR performed  by MAY RIVERA at [facility]     CYSTOCELE REPAIR  2007     HYSTERECTOMY, JOSEE  1978    Hysterectomy     INJECT EPIDURAL LUMBAR  10/19/2011    Procedure:INJECT EPIDURAL LUMBAR; DESHAWN-; Surgeon:GENERIC ANESTHESIA PROVIDER; Location:[facility]     INJECT JOINT SACROILIAC  2/7/2011    INJECT JOINT SACROILIAC performed by GENERIC ANESTHESIA PROVIDER at [facility]     INJECT JOINT SACROILIAC  4/25/2012    Procedure:INJECT JOINT SACROILIAC; DESHAWN SI Joint --; Surgeon:GENERIC ANESTHESIA PROVIDER; Location:[facility]     PHACOEMULSIFICATION WITH STANDARD INTRAOCULAR LENS IMPLANT  5/26/2011    Procedure:PHACOEMULSIFICATION WITH STANDARD INTRAOCULAR LENS IMPLANT; Surgeon:JOSÉ MANUEL DORSEY; Location:[facility]     PHACOEMULSIFICATION WITH STANDARD INTRAOCULAR LENS IMPLANT  6/9/2011    Procedure:PHACOEMULSIFICATION WITH STANDARD INTRAOCULAR LENS IMPLANT; Surgeon:JOSÉ MANUEL DORSEY; Location:[facility]     RECTOCELE REPAIR  2007     SURGICAL HISTORY OF -       bilateral foot surgery; subtalar joint fusion; bunion     SURGICAL HISTORY OF -   2009    Posterior colporrhaphy w/ posterior Pro-Lift     TONSILLECTOMY            Medications  Prior to Admission medications    Medication Sig Start Date End Date Taking? Authorizing Provider   levothyroxine (SYNTHROID/LEVOTHROID) 112 MCG tablet Take 1 tablet (112 mcg) by mouth daily 10/2/17  Yes Aliyah Aggarwal MD   losartan (COZAAR) 50 MG tablet Take 1 tablet (50 mg) by mouth daily 10/2/17  Yes Aliyah Aggarwal MD   ranitidine (ZANTAC) 150 MG tablet Take 1 tablet (150 mg) by mouth 2 times daily 10/2/17  Yes Aliyah Aggarwal MD   Omega-3 Fatty Acids (OMEGA 3 PO) Take by mouth daily   Yes Reported, Patient   propylene glycol (SYSTANE BALANCE) 0.6 % SOLN ophthalmic solution 1 drop   Yes Reported, Patient   nabumetone (RELAFEN) 750 MG tablet Take 1 tablet (750 mg) by mouth 2 times daily as needed for moderate pain 6/20/17  Yes Luan Rader PA-C   Calcium Carbonate-Vit D-Min  (CALCIUM 1200 PO) Take 1 tablet by mouth daily   Yes Reported, Patient   Diphenhydramine-APAP, sleep, (TYLENOL PM EXTRA STRENGTH PO) Take by mouth At Bedtime   Yes Reported, Patient   cycloSPORINE (RESTASIS) 0.05 % ophthalmic emulsion Place 1 drop into both eyes 2 times daily   Yes Reported, Patient   Cholecalciferol (VITAMIN D) 2000 UNITS CAPS Take  by mouth daily.   Yes Reported, Patient   ASPIRIN 81 MG OR TABS 1 TABLET BY MOUTH DAILY 1/8/07  Yes Sara Jordan MD       Allergies  Allergies   Allergen Reactions     Ace Inhibitors Cough     Amlodipine Besylate      Swellings       Dilaudid [Hydromorphone Hcl] Hives     Percocet [Oxycodone-Acetaminophen] Other (See Comments)     Severe dizziness         Family hx Social hx   Family History   Problem Relation Age of Onset     Cardiovascular Brother      Hypertension Brother      Thyroid Disease Brother      Cardiovascular Paternal Grandmother      HEART DISEASE Paternal Grandmother      RIP MI age 71     Cardiovascular Paternal Grandfather      C.A.D. Mother      Hypertension Mother      Breast Cancer Mother      Alcohol/Drug Mother      Allergies Mother      Depression Mother      OSTEOPOROSIS Mother      Breast Cancer Maternal Grandmother      Breast Cancer Sister      OSTEOPOROSIS Sister      Liver Disease Sister      PBC     Thyroid Disease Father      Cardiovascular Maternal Grandfather      HEART DISEASE Maternal Grandfather      RIP MI, age 81     Neurologic Disorder Son      Obesity Daughter      GASTROINTESTINAL DISEASE Daughter      gallbladder surg     Thyroid Disease Son       Social History   Substance Use Topics     Smoking status: Former Smoker     Years: 20.00     Quit date: 9/23/1985     Smokeless tobacco: Never Used     Alcohol use Yes      Comment: 1 Q 1-2 weeks     Lives in [place] with .  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     Review of systems  A 10-point review of systems was  "negative.    Examination  /78  Pulse 82  Temp 98  F (36.7  C) (Oral)  Ht 1.594 m (5' 2.75\")  Wt 97.5 kg (215 lb)  SpO2 97%  BMI 38.39 kg/m2  Body mass index is 38.39 kg/(m^2).    Gen- well, NAD, A+Ox3, normal color  Eye- EOMI  ENT- MMM, normal oropharynx  Lym- no palpable lymphadenopathy  CVS- S1, S2 normal, no added sounds, RRR  RS- CTA  Abd- obese, SNT, no ascites or organomegaly on palpation or percussion, BS+  Extr- pulses good, no YUMI  MS- hands normal- no clubbing  Neuro- A+Ox3, no asterixis  Skin- no rash or jaundice  Psych- normal mood    Laboratory  Lab Results   Component Value Date     10/02/2017    POTASSIUM 4.2 10/02/2017    CHLORIDE 104 10/02/2017    CO2 28 10/02/2017    BUN 23 10/02/2017    CR 0.67 10/02/2017       Lab Results   Component Value Date    BILITOTAL 0.5 11/21/2017    ALT 40 11/21/2017    AST 37 11/21/2017    ALKPHOS 326 11/21/2017       Lab Results   Component Value Date    ALBUMIN 3.5 11/21/2017    PROTTOTAL 7.6 11/21/2017        Lab Results   Component Value Date    WBC 6.5 10/02/2017    HGB 12.4 10/02/2017    MCV 88 10/02/2017     10/02/2017       No results found for: INR      Radiology  Abd U/S Oct 2017 reviewed    Assessment  72-year-old female with a history of hypothyroidism who presents for evaluation of abnormal alkaline phosphatase.  Differential diagnosis includes primary biliary cholangitis, particularly in the context of the patient's family history.  Early stage primary biliary cholangitis can sometimes have the radiographic appearance of hepatic steatosis on ultrasound.  No clinical, biochemical or radiographic evidence of cirrhosis.  Will obtain an anti-mitochondrial antibody to rule in out/out PBC in addition to other serologies to rule out other etiologies of chronic liver disease.      We discussed the pathophysiology, natural history and treatment of primary biliary cholangitis and non-alcoholic fatty liver disease.     Plan  1.  Check LFTs, " BMP, CBC, INR  2.  Check AMA, IgM  3.  Check HAV Ab, HBV SAg, HBV SAb, HBV CAb, HCV Ab  4.  Follow-up in 6 months    Dereck Aguilar MD  Hepatology  [facility]

## 2017-12-19 NOTE — LETTER
12/19/2017      RE: Gail Dewitt  [id number] [facility] [address]       [facility]    Hepatology New Patient Visit    Referring provider:  Aliyah Dewitt Alessia      72 year old female    Chief complaint:  Abnormal liver function tests    HPI:  The patient comes to clinic this morning with her  for further evaluation and management of elevated alkaline phosphatase.  This was first identified in the fall 2017.  On review of Epic records, it has been present since 2009.  The patient had an ultrasound in October which showed increased echogenicity, but no evidence of biliary obstruction.      Patient is doing well overall.  She denies any signs or symptoms specific to liver disease.      The patient denies itch, rash, jaundice, abdominal distention, lower extremity edema, lethargy or confusion.      No history of melena, hematemesis or hematochezia.      The patient denies any fevers, sweats or chills.  Weight has been stable.      The patient denies any recent new medications other than ranitidine for the past 2 months for GERD.  The patient denies any antibiotics over the last 6-12 months.  The patient denies any herbal medications or remedies.     Family history of sister with PBC noted.     The patient drinks alcohol.  She drinks 1 wine cooler per month.  She denies any history of alcohol abuse.      The patient is an ex-smoker.  She quit in 1980.  Prior to that, she was smoking a half pack per day for 15 years.  The patient denies any history of recreational drug use including marijuana, intranasal or intravenous drugs.     Medical hx Surgical hx   Past Medical History:   Diagnosis Date     Dyslipidemia      Eczema      Need for prophylactic hormone replacement therapy (postmenopausal)      Obesity      Unspecified essential hypertension      Unspecified hypothyroidism       Past Surgical History:   Procedure Laterality Date     APPENDECTOMY       BREAST  BIOPSY, RT/LT Right 1970     COLPORRHAPHY ANTERIOR  12/6/2010    COLPORRHAPHY ANTERIOR performed by MAY RIVERA at [facility]     CYSTOCELE REPAIR  2007     HYSTERECTOMY, JOSEE  1978    Hysterectomy     INJECT EPIDURAL LUMBAR  10/19/2011    Procedure:INJECT EPIDURAL LUMBAR; DESHAWN-; Surgeon:GENERIC ANESTHESIA PROVIDER; Location:[facility]     INJECT JOINT SACROILIAC  2/7/2011    INJECT JOINT SACROILIAC performed by GENERIC ANESTHESIA PROVIDER at [facility]     INJECT JOINT SACROILIAC  4/25/2012    Procedure:INJECT JOINT SACROILIAC; DESHAWN SI Joint --; Surgeon:GENERIC ANESTHESIA PROVIDER; Location:[facility]     PHACOEMULSIFICATION WITH STANDARD INTRAOCULAR LENS IMPLANT  5/26/2011    Procedure:PHACOEMULSIFICATION WITH STANDARD INTRAOCULAR LENS IMPLANT; Surgeon:JOSÉ MANUEL DORSEY; Location:[facility]     PHACOEMULSIFICATION WITH STANDARD INTRAOCULAR LENS IMPLANT  6/9/2011    Procedure:PHACOEMULSIFICATION WITH STANDARD INTRAOCULAR LENS IMPLANT; Surgeon:JOSÉ MANUEL DORSEY; Location:[facility]     RECTOCELE REPAIR  2007     SURGICAL HISTORY OF -       bilateral foot surgery; subtalar joint fusion; bunion     SURGICAL HISTORY OF -   2009    Posterior colporrhaphy w/ posterior Pro-Lift     TONSILLECTOMY            Medications  Prior to Admission medications    Medication Sig Start Date End Date Taking? Authorizing Provider   levothyroxine (SYNTHROID/LEVOTHROID) 112 MCG tablet Take 1 tablet (112 mcg) by mouth daily 10/2/17  Yes Aliyah Aggarwal MD   losartan (COZAAR) 50 MG tablet Take 1 tablet (50 mg) by mouth daily 10/2/17  Yes Aliyah Aggarwal MD   ranitidine (ZANTAC) 150 MG tablet Take 1 tablet (150 mg) by mouth 2 times daily 10/2/17  Yes Aliyah Aggarwal MD   Omega-3 Fatty Acids (OMEGA 3 PO) Take by mouth daily   Yes Reported, Patient   propylene glycol (SYSTANE BALANCE) 0.6 % SOLN ophthalmic solution 1 drop   Yes Reported, Patient   nabumetone (RELAFEN) 750 MG tablet Take 1 tablet (750 mg) by mouth 2 times daily  as needed for moderate pain 6/20/17  Yes Luan Rader PA-C   Calcium Carbonate-Vit D-Min (CALCIUM 1200 PO) Take 1 tablet by mouth daily   Yes Reported, Patient   Diphenhydramine-APAP, sleep, (TYLENOL PM EXTRA STRENGTH PO) Take by mouth At Bedtime   Yes Reported, Patient   cycloSPORINE (RESTASIS) 0.05 % ophthalmic emulsion Place 1 drop into both eyes 2 times daily   Yes Reported, Patient   Cholecalciferol (VITAMIN D) 2000 UNITS CAPS Take  by mouth daily.   Yes Reported, Patient   ASPIRIN 81 MG OR TABS 1 TABLET BY MOUTH DAILY 1/8/07  Yes Sara Jordan MD       Allergies  Allergies   Allergen Reactions     Ace Inhibitors Cough     Amlodipine Besylate      Swellings       Dilaudid [Hydromorphone Hcl] Hives     Percocet [Oxycodone-Acetaminophen] Other (See Comments)     Severe dizziness         Family hx Social hx   Family History   Problem Relation Age of Onset     Cardiovascular Brother      Hypertension Brother      Thyroid Disease Brother      Cardiovascular Paternal Grandmother      HEART DISEASE Paternal Grandmother      RIP MI age 71     Cardiovascular Paternal Grandfather      C.A.D. Mother      Hypertension Mother      Breast Cancer Mother      Alcohol/Drug Mother      Allergies Mother      Depression Mother      OSTEOPOROSIS Mother      Breast Cancer Maternal Grandmother      Breast Cancer Sister      OSTEOPOROSIS Sister      Liver Disease Sister      PBC     Thyroid Disease Father      Cardiovascular Maternal Grandfather      HEART DISEASE Maternal Grandfather      RIP MI, age 81     Neurologic Disorder Son      Obesity Daughter      GASTROINTESTINAL DISEASE Daughter      gallbladder surg     Thyroid Disease Son       Social History   Substance Use Topics     Smoking status: Former Smoker     Years: 20.00     Quit date: 9/23/1985     Smokeless tobacco: Never Used     Alcohol use Yes      Comment: 1 Q 1-2 weeks     Lives in [place] with .  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     Review of systems  A 10-point review of systems was negative.    Examination  /78  Pulse 82  Temp 98  F (36.7  C) (Oral)  Ht 1.594 m (5' 2.75\")  Wt 97.5 kg (215 lb)  SpO2 97%  BMI 38.39 kg/m2  Body mass index is 38.39 kg/(m^2).    Gen- well, NAD, A+Ox3, normal color  Eye- EOMI  ENT- MMM, normal oropharynx  Lym- no palpable lymphadenopathy  CVS- S1, S2 normal, no added sounds, RRR  RS- CTA  Abd- obese, SNT, no ascites or organomegaly on palpation or percussion, BS+  Extr- pulses good, no YUMI  MS- hands normal- no clubbing  Neuro- A+Ox3, no asterixis  Skin- no rash or jaundice  Psych- normal mood    Laboratory  Lab Results   Component Value Date     10/02/2017    POTASSIUM 4.2 10/02/2017    CHLORIDE 104 10/02/2017    CO2 28 10/02/2017    BUN 23 10/02/2017    CR 0.67 10/02/2017       Lab Results   Component Value Date    BILITOTAL 0.5 11/21/2017    ALT 40 11/21/2017    AST 37 11/21/2017    ALKPHOS 326 11/21/2017       Lab Results   Component Value Date    ALBUMIN 3.5 11/21/2017    PROTTOTAL 7.6 11/21/2017        Lab Results   Component Value Date    WBC 6.5 10/02/2017    HGB 12.4 10/02/2017    MCV 88 10/02/2017     10/02/2017       No results found for: INR    Radiology  Abd U/S Oct 2017 reviewed    Assessment  72-year-old female with a history of hypothyroidism who presents for evaluation of abnormal alkaline phosphatase.  Differential diagnosis includes primary biliary cholangitis, particularly in the context of the patient's family history.  Early stage primary biliary cholangitis can sometimes have the radiographic appearance of hepatic steatosis on ultrasound.  No clinical, biochemical or radiographic evidence of cirrhosis.  Will obtain an anti-mitochondrial antibody to rule in out/out PBC in addition to other serologies to rule out other etiologies of chronic liver disease.      We discussed the pathophysiology, natural history and treatment of primary biliary " cholangitis and non-alcoholic fatty liver disease.     Plan  1.  Check LFTs, BMP, CBC, INR  2.  Check AMA, IgM  3.  Check HAV Ab, HBV SAg, HBV SAb, HBV CAb, HCV Ab  4.  Follow-up in 6 months    Dereck Aguilar MD  Hepatology  [facility]

## 2017-12-19 NOTE — MR AVS SNAPSHOT
After Visit Summary   12/19/2017    Gail Dewitt    MRN: [id number]           Patient Information     Date Of Birth          1945        Visit Information        Provider Department      12/19/2017 10:40 AM Dereck Way MD [facility] Hepatology        Today's Diagnoses     Elevated alkaline phosphatase level        Fatty liver           Follow-ups after your visit        Follow-up notes from your care team     Return in about 6 months (around 6/19/2018).      Your next 10 appointments already scheduled     Dec 19, 2017 11:30 AM CST   Lab with  LAB   [facility] ([facility])    [address] [place] [id number]   [phone]            Jun 19, 2018  9:30 AM CDT   Lab with  LAB   [facility] ([facility])    [address] [place] [id number]   [phone]            Jun 19, 2018 10:40 AM CDT   (Arrive by 10:25 AM)   Return General Liver with Dereck Key MD   [facility] Hepatology ([facility])    [address] [place] [id number]   [phone]              Future tests that were ordered for you today     Open Future Orders        Priority Expected Expires Ordered    INR Routine  1/18/2018 12/19/2017    Basic metabolic panel Routine  1/18/2018 12/19/2017    Hepatitis Antibody A IgG Routine  1/18/2018 12/19/2017    Hepatitis B core antibody Routine  1/18/2018 12/19/2017    Hepatitis B surface antigen Routine  1/18/2018 12/19/2017    Hepatitis B Surface Antibody Routine  1/18/2018 12/19/2017    Hepatitis C antibody Routine  1/18/2018 12/19/2017    IgM Routine  1/18/2018 12/19/2017    Mitochondrial M2 Antibody IgG Routine  1/18/2018 12/19/2017    Hepatic panel Routine  1/18/2018 12/19/2017    CBC with platelets Routine  1/18/2018 12/19/2017            Who to contact     If you have questions or need follow up information about  "today's clinic visit or your schedule please contact [facility] HEPATOLOGY directly at [phone].  Normal or non-critical lab and imaging results will be communicated to you by PawClinichart, letter or phone within 4 business days after the clinic has received the results. If you do not hear from us within 7 days, please contact the clinic through PawClinichart or phone. If you have a critical or abnormal lab result, we will notify you by phone as soon as possible.  Submit refill requests through ShowMe or call your pharmacy and they will forward the refill request to us. Please allow 3 business days for your refill to be completed.          Additional Information About Your Visit        PawClinicharSmarter Agent Mobile Information     ShowMe gives you secure access to your electronic health record. If you see a primary care provider, you can also send messages to your care team and make appointments. If you have questions, please call your primary care clinic.  If you do not have a primary care provider, please call [phone] and they will assist you.        Care EveryWhere ID     This is your Care EveryWhere ID. This could be used by other organizations to access your [place] medical records  [id number]        Your Vitals Were     Pulse Temperature Height Pulse Oximetry BMI (Body Mass Index)       82 98  F (36.7  C) (Oral) 1.594 m (5' 2.75\") 97% 38.39 kg/m2        Blood Pressure from Last 3 Encounters:   12/19/17 133/78   10/05/17 130/60   10/02/17 130/60    Weight from Last 3 Encounters:   12/19/17 97.5 kg (215 lb)   10/05/17 99.3 kg (219 lb)   10/02/17 99.3 kg (219 lb)                 Today's Medication Changes          These changes are accurate as of: 12/19/17 11:09 AM.  If you have any questions, ask your nurse or doctor.               Stop taking these medicines if you haven't already. Please contact your care team if you have questions.     ketorolac 10 MG tablet   Commonly known as:  TORADOL   Stopped by:  Dereck Way MD "                    Primary Care Provider Office Phone # Fax #    Aliyah Aggarwal [phone] [address] [place] [address]        Equal Access to Services     ADAMBRAD RANI : Silvia rc astudillo malvin Barrera, waefremda luqadaha, qaybta kayrisda corona, maria del carmen floresnik sukh. So [facility] [phone].    ATENCIÓN: Si habla español, tiene a cates disposición servicios gratuitos de asistencia lingüística. Llame al [phone].    We comply with applicable federal civil rights laws and [place] laws. We do not discriminate on the basis of race, color, national origin, age, disability, sex, sexual orientation, or gender identity.            Thank you!     Thank you for choosing [facility] HEPATOLOGY  for your care. Our goal is always to provide you with excellent care. Hearing back from our patients is one way we can continue to improve our services. Please take a few minutes to complete the written survey that you may receive in the mail after your visit with us. Thank you!             Your Updated Medication List - Protect others around you: Learn how to safely use, store and throw away your medicines at www.disposemymeds.org.          This list is accurate as of: 12/19/17 11:09 AM.  Always use your most recent med list.                   Brand Name Dispense Instructions for use Diagnosis    aspirin 81 MG tablet     100    1 TABLET BY MOUTH DAILY    Essential hypertension, benign       CALCIUM 1200 PO      Take 1 tablet by mouth daily        cycloSPORINE 0.05 % ophthalmic emulsion    RESTASIS     Place 1 drop into both eyes 2 times daily        levothyroxine 112 MCG tablet    SYNTHROID/LEVOTHROID    90 tablet    Take 1 tablet (112 mcg) by mouth daily    Acquired hypothyroidism       losartan 50 MG tablet    COZAAR    90 tablet    Take 1 tablet (50 mg) by mouth daily    Benign essential hypertension       nabumetone 750 MG tablet    RELAFEN    30 tablet    Take 1 tablet (750  mg) by mouth 2 times daily as needed for moderate pain    Acute pain of right knee       OMEGA 3 PO      Take by mouth daily        propylene glycol 0.6 % Soln ophthalmic solution    SYSTANE BALANCE     1 drop        ranitidine 150 MG tablet    ZANTAC    60 tablet    Take 1 tablet (150 mg) by mouth 2 times daily    Gastroesophageal reflux disease without esophagitis       TYLENOL PM EXTRA STRENGTH PO      Take by mouth At Bedtime    Pain of left lower extremity       vitamin D 2000 UNITS Caps      Take  by mouth daily.

## 2017-12-19 NOTE — NURSING NOTE
Chief Complaint   Patient presents with     Consult     Elevated Alkaline phosphatase/Fatty Liver   Pt roomed, vitals, meds, and allergies reviewed with pt. Pt ready for provider.  Mike Novoa, CMA

## 2017-12-20 LAB
IGM SERPL-MCNC: 236 MG/DL (ref 60–265)
MITOCHONDRIA M2 IGG SER-ACNC: >220 U/ML

## 2017-12-21 DIAGNOSIS — E03.9 HYPOTHYROIDISM: Primary | ICD-10-CM

## 2017-12-21 RX ORDER — LEVOTHYROXINE SODIUM 125 UG/1
125 TABLET ORAL DAILY
Qty: 30 TABLET | Refills: 1 | COMMUNITY
Start: 2017-12-21 | End: 2018-02-14

## 2017-12-22 ENCOUNTER — TELEPHONE (OUTPATIENT)
Dept: GASTROENTEROLOGY | Facility: CLINIC | Age: 72
End: 2017-12-22

## 2017-12-22 DIAGNOSIS — K74.3 PRIMARY BILIARY CHOLANGITIS (H): Primary | ICD-10-CM

## 2017-12-22 RX ORDER — URSODIOL 500 MG/1
1 TABLET, FILM COATED ORAL 3 TIMES DAILY
Qty: 180 TABLET | Refills: 4 | Status: SHIPPED | OUTPATIENT
Start: 2017-12-22 | End: 2017-12-27

## 2017-12-22 NOTE — TELEPHONE ENCOUNTER
Called patient to review blood test results.    Patient is immune to hepatitis A.  No evidence of hepatitis B or C.    AMA is strongly positive.    Positive AMA in addition to chronic elevation in alkaline phosphatase is consistent with primary biliary cholangitis.    Will start ursodiol 500mg PO TID (~15mg/kg/day).  Rx sent to [facility] in [place].    Patient has clinic follow-up with me scheduled in June 2018.    Dereck Aguilar MD  Hepatology  [facility]

## 2017-12-27 DIAGNOSIS — K74.3 PRIMARY BILIARY CHOLANGITIS (H): ICD-10-CM

## 2017-12-27 RX ORDER — URSODIOL 500 MG/1
1 TABLET, FILM COATED ORAL 3 TIMES DAILY
Qty: 180 TABLET | Refills: 4 | Status: SHIPPED | OUTPATIENT
Start: 2017-12-27 | End: 2018-12-31

## 2018-01-30 DIAGNOSIS — E03.9 HYPOTHYROIDISM: ICD-10-CM

## 2018-01-30 LAB — TSH SERPL DL<=0.005 MIU/L-ACNC: 2.56 MU/L (ref 0.4–4)

## 2018-01-30 PROCEDURE — 36415 COLL VENOUS BLD VENIPUNCTURE: CPT | Performed by: FAMILY MEDICINE

## 2018-01-30 PROCEDURE — 84443 ASSAY THYROID STIM HORMONE: CPT | Performed by: FAMILY MEDICINE

## 2018-02-05 ENCOUNTER — OFFICE VISIT (OUTPATIENT)
Dept: GASTROENTEROLOGY | Facility: CLINIC | Age: 73
End: 2018-02-05
Attending: INTERNAL MEDICINE
Payer: MEDICARE

## 2018-02-05 VITALS
BODY MASS INDEX: 38.02 KG/M2 | DIASTOLIC BLOOD PRESSURE: 75 MMHG | HEIGHT: 63 IN | WEIGHT: 214.6 LBS | HEART RATE: 72 BPM | RESPIRATION RATE: 18 BRPM | SYSTOLIC BLOOD PRESSURE: 147 MMHG

## 2018-02-05 DIAGNOSIS — K74.3 PRIMARY BILIARY CHOLANGITIS (H): ICD-10-CM

## 2018-02-05 DIAGNOSIS — K74.3 PRIMARY BILIARY CHOLANGITIS (H): Primary | ICD-10-CM

## 2018-02-05 PROCEDURE — G0463 HOSPITAL OUTPT CLINIC VISIT: HCPCS

## 2018-02-05 PROCEDURE — 91200 LIVER ELASTOGRAPHY: CPT | Mod: ZF

## 2018-02-05 ASSESSMENT — PAIN SCALES - GENERAL: PAINLEVEL: NO PAIN (0)

## 2018-02-05 NOTE — PROGRESS NOTES
[facility]    Hepatology follow-up    Follow-up visit for PBC    Subjective:  72 year old female    PBC  - dx Dec 2017  - AMA(+)  - hx itch  - fam hx PBC  - hx hypothyroidism, dyslipidemia  - UDCA since Dec 2017    The patient comes to clinic this morning with her daughter for follow-up of PBC.  The patient was diagnosed with PBC in 12/2017 after evaluation for abnormal liver tests showed a strongly positive AMA and elevated alkaline phosphatase.  Her dose of levothyroxine has been increased recently because of abnormal TSH.  The patient denies any other medication changes, ER visits or hospital admissions since last clinic visit.      Patient is doing well today.  She has a number of questions related to PBC and liver disease.      She does report intermittent severe itching occurs mostly at night time in her lower back and perineal area.  She uses topical Benadryl which largely relieves symptoms.      The patient denies rash, jaundice, lower extremity edema, abdominal distention, lethargy or confusion.      No history of melena, hematemesis or hematochezia.      The patient denies any fevers, sweats or chills.  Weight has been stable.      The patient had 1 glass of wine at [facility].  One of her questions relates to alcohol use in history of PBC.       Medical hx Surgical hx   Past Medical History:   Diagnosis Date     Dyslipidemia      Eczema      Need for prophylactic hormone replacement therapy (postmenopausal)      Obesity      Unspecified essential hypertension      Unspecified hypothyroidism       Past Surgical History:   Procedure Laterality Date     APPENDECTOMY       BREAST BIOPSY, RT/LT Right 1970     COLPORRHAPHY ANTERIOR  12/6/2010    COLPORRHAPHY ANTERIOR performed by MAY RIVERA at [facility]     CYSTOCELE REPAIR  2007     HYSTERECTOMY, JOSEE  1978    Hysterectomy     INJECT EPIDURAL LUMBAR  10/19/2011    Procedure:INJECT EPIDURAL LUMBAR; DESHAWN-;  Surgeon:GENERIC ANESTHESIA PROVIDER; Location:[facility]     INJECT JOINT SACROILIAC  2/7/2011    INJECT JOINT SACROILIAC performed by GENERIC ANESTHESIA PROVIDER at [facility]     INJECT JOINT SACROILIAC  4/25/2012    Procedure:INJECT JOINT SACROILIAC; DESHAWN SI Joint --; Surgeon:GENERIC ANESTHESIA PROVIDER; Location:[facility]     PHACOEMULSIFICATION WITH STANDARD INTRAOCULAR LENS IMPLANT  5/26/2011    Procedure:PHACOEMULSIFICATION WITH STANDARD INTRAOCULAR LENS IMPLANT; Surgeon:JOSÉ MANUEL DORSEY; Location:[facility]     PHACOEMULSIFICATION WITH STANDARD INTRAOCULAR LENS IMPLANT  6/9/2011    Procedure:PHACOEMULSIFICATION WITH STANDARD INTRAOCULAR LENS IMPLANT; Surgeon:JOSÉ MANUEL DORSEY; Location:[facility]     RECTOCELE REPAIR  2007     SURGICAL HISTORY OF -       bilateral foot surgery; subtalar joint fusion; bunion     SURGICAL HISTORY OF -   2009    Posterior colporrhaphy w/ posterior Pro-Lift     TONSILLECTOMY            Medications  Prior to Admission medications    Medication Sig Start Date End Date Taking? Authorizing Provider   ursodiol 500 MG TABS Take 1 tablet by mouth 3 times daily 12/27/17  Yes Dereck Way MD   levothyroxine (SYNTHROID/LEVOTHROID) 125 MCG tablet Take 1 tablet (125 mcg) by mouth daily 12/21/17  Yes Aliyah Aggarwal MD   losartan (COZAAR) 50 MG tablet Take 1 tablet (50 mg) by mouth daily 10/2/17  Yes Aliyah Aggarwal MD   ranitidine (ZANTAC) 150 MG tablet Take 1 tablet (150 mg) by mouth 2 times daily 10/2/17  Yes Aliyah Aggarwal MD   Omega-3 Fatty Acids (OMEGA 3 PO) Take by mouth daily   Yes Reported, Patient   propylene glycol (SYSTANE BALANCE) 0.6 % SOLN ophthalmic solution 1 drop   Yes Reported, Patient   nabumetone (RELAFEN) 750 MG tablet Take 1 tablet (750 mg) by mouth 2 times daily as needed for moderate pain 6/20/17  Yes Luan Rader PA-C   Calcium Carbonate-Vit D-Min (CALCIUM 1200 PO) Take 1 tablet by mouth daily   Yes Reported, Patient   Diphenhydramine-APAP,  "sleep, (TYLENOL PM EXTRA STRENGTH PO) Take by mouth At Bedtime   Yes Reported, Patient   Cholecalciferol (VITAMIN D) 2000 UNITS CAPS Take  by mouth daily.   Yes Reported, Patient   ASPIRIN 81 MG OR TABS 1 TABLET BY MOUTH DAILY 1/8/07  Yes Sara Jordan MD       Allergies  Allergies   Allergen Reactions     Ace Inhibitors Cough     Amlodipine Besylate      Swellings       Dilaudid [Hydromorphone Hcl] Hives     Percocet [Oxycodone-Acetaminophen] Other (See Comments)     Severe dizziness         Review of systems  A 10-point review of systems was negative    Examination  /75 (Cuff Size: Adult Regular)  Pulse 72  Resp 18  Ht 1.594 m (5' 2.75\")  Wt 97.3 kg (214 lb 9.6 oz)  BMI 38.32 kg/m2  Body mass index is 38.32 kg/(m^2).    Gen- well, NAD, A+Ox3, normal color  Skin- no rash or jaundice    Laboratory  Lab Results   Component Value Date     12/19/2017    POTASSIUM 3.9 12/19/2017    CHLORIDE 104 12/19/2017    CO2 25 12/19/2017    BUN 14 12/19/2017    CR 0.66 12/19/2017       Lab Results   Component Value Date    BILITOTAL 0.4 12/19/2017    ALT 32 12/19/2017    AST 29 12/19/2017    ALKPHOS 273 12/19/2017       Lab Results   Component Value Date    ALBUMIN 3.2 12/19/2017    PROTTOTAL 7.6 12/19/2017        Lab Results   Component Value Date    WBC 5.7 12/19/2017    HGB 12.0 12/19/2017    MCV 88 12/19/2017     12/19/2017       Lab Results   Component Value Date    INR 0.99 12/19/2017       Radiology  Nil new    Assessment  72-year-old female with history of dyslipidemia and hypothyroidism who presents for follow-up of recently diagnosed primary biliary cholangitis.  No clinical, biochemical or radiographic evidence of cirrhosis.  Will obtain Fibrosis Scan to objectively stage the patient's liver disease.  The patient finds the co-pay for ursodiol significantly expensive despite trying to purchase it in [place] and will try to obtain her medications from [place] instead.  Will continue to use " the topical Benadryl for itch but sertraline can be used if her symptoms progress.     Plan  1.  Ursodiol 500mg PO TID  2.  Benadryl TOP PRN for itch  3.  Fibrosis Scan today  4.  Weight loss with diet and exercise  5.  Rare ETOH use OK  6.  Follow-up in 6 months    Dereck Aguilar MD  Hepatology  [facility]

## 2018-02-05 NOTE — NURSING NOTE
"Chief Complaint   Patient presents with     RECHECK     Gail is here today for a follow up.        Vitals:    02/05/18 0823   BP: 147/75   Cuff Size: Adult Regular   Pulse: 72   Resp: 18   Weight: 97.3 kg (214 lb 9.6 oz)   Height: 1.594 m (5' 2.75\")       Body mass index is 38.32 kg/(m^2).                              "

## 2018-02-05 NOTE — MR AVS SNAPSHOT
After Visit Summary   2/5/2018    Gail Dewitt    MRN: [id number]           Patient Information     Date Of Birth          1945        Visit Information        Provider Department      2/5/2018 8:40 AM Dereck Way MD [facility] Hepatology        Today's Diagnoses     Primary biliary cholangitis (H)    -  1       Follow-ups after your visit        Your next 10 appointments already scheduled     Aug 20, 2018  9:00 AM CDT   Lab with  LAB   [facility] Lab ([facility])    [address]  1st Floor  [facility] [address] [phone]            Aug 20, 2018 10:00 AM CDT   (Arrive by 9:45 AM)   Return General Liver with Dereck Key MD   [facility] Hepatology ([facility])    [address] [place] [address]  [address] [place] [address] [phone]              Who to contact     If you have questions or need follow up information about today's clinic visit or your schedule please contact [facility] HEPATOLOGY directly at [phone].  Normal or non-critical lab and imaging results will be communicated to you by [employer], letter or phone within 4 business days after the clinic has received the results. If you do not hear from us within 7 days, please contact the clinic through [email or web address] or phone. If you have a critical or abnormal lab result, we will notify you by phone as soon as possible.  Submit refill requests through [employer] or call your pharmacy and they will forward the refill request to us. Please allow 3 business days for your refill to be completed.          Additional Information About Your Visit        [employer] Information     [employer] gives you secure access to your electronic health record. If you see a primary care provider, you can also send messages to your care team and make appointments. If you have questions, please call your primary care clinic.  If you do not have a primary care provider, please call  "[phone] and they will assist you.        Care EveryWhere ID     This is your Care EveryWhere ID. This could be used by other organizations to access your [place] medical records  [id number]        Your Vitals Were     Pulse Respirations Height BMI (Body Mass Index)          72 18 1.594 m (5' 2.75\") 38.32 kg/m2         Blood Pressure from Last 3 Encounters:   02/05/18 147/75   12/19/17 133/78   10/05/17 130/60    Weight from Last 3 Encounters:   02/05/18 97.3 kg (214 lb 9.6 oz)   12/19/17 97.5 kg (215 lb)   10/05/17 99.3 kg (219 lb)               Primary Care Provider Office Phone # Fax #    Aliyah Aggarwal [phone] [address] [place] [address]        Equal Access to Services     [facility]: Hadii cr astudillo hadasho Sojerome, waaxda luqadaha, qaybta kaalmada adeegyada, maria del carmen fleming hayaldo bermudez . So [facility] [phone].    ATENCIÓN: Si habla español, tiene a cates disposición servicios gratuitos de asistencia lingüística. Prateek al [phone].    We comply with applicable federal civil rights laws and [place] laws. We do not discriminate on the basis of race, color, national origin, age, disability, sex, sexual orientation, or gender identity.            Thank you!     Thank you for choosing [facility] HEPATOLOGY  for your care. Our goal is always to provide you with excellent care. Hearing back from our patients is one way we can continue to improve our services. Please take a few minutes to complete the written survey that you may receive in the mail after your visit with us. Thank you!             Your Updated Medication List - Protect others around you: Learn how to safely use, store and throw away your medicines at www.disposemymeds.org.          This list is accurate as of 2/5/18  9:05 PM.  Always use your most recent med list.                   Brand Name Dispense Instructions for use Diagnosis    aspirin 81 MG tablet     100    1 TABLET BY MOUTH DAILY "    Essential hypertension, benign       CALCIUM 1200 PO      Take 1 tablet by mouth daily        levothyroxine 125 MCG tablet    SYNTHROID/LEVOTHROID    30 tablet    Take 1 tablet (125 mcg) by mouth daily        losartan 50 MG tablet    COZAAR    90 tablet    Take 1 tablet (50 mg) by mouth daily    Benign essential hypertension       nabumetone 750 MG tablet    RELAFEN    30 tablet    Take 1 tablet (750 mg) by mouth 2 times daily as needed for moderate pain    Acute pain of right knee       OMEGA 3 PO      Take by mouth daily        propylene glycol 0.6 % Soln ophthalmic solution    SYSTANE BALANCE     1 drop        ranitidine 150 MG tablet    ZANTAC    60 tablet    Take 1 tablet (150 mg) by mouth 2 times daily    Gastroesophageal reflux disease without esophagitis       TYLENOL PM EXTRA STRENGTH PO      Take by mouth At Bedtime    Pain of left lower extremity       ursodiol 500 MG Tabs     180 tablet    Take 1 tablet by mouth 3 times daily    Primary biliary cholangitis (H)       vitamin D 2000 UNITS Caps      Take  by mouth daily.

## 2018-02-05 NOTE — LETTER
2/5/2018       RE: Gail Dewitt  [address] [facility] [address]     Dear Colleague,    Thank you for referring your patient, Gail Dewitt, to the [facility] HEPATOLOGY at [facility]. Please see a copy of my visit note below.    [facility]    Hepatology follow-up    Follow-up visit for PBC    Subjective:  72 year old female    PBC  - dx Dec 2017  - AMA(+)  - hx itch  - fam hx PBC  - hx hypothyroidism, dyslipidemia  - UDCA since Dec 2017    The patient comes to clinic this morning with her daughter for follow-up of PBC.  The patient was diagnosed with PBC in 12/2017 after evaluation for abnormal liver tests showed a strongly positive AMA and elevated alkaline phosphatase.  Her dose of levothyroxine has been increased recently because of abnormal TSH.  The patient denies any other medication changes, ER visits or hospital admissions since last clinic visit.      Patient is doing well today.  She has a number of questions related to PBC and liver disease.      She does report intermittent severe itching occurs mostly at night time in her lower back and perineal area.  She uses topical Benadryl which largely relieves symptoms.      The patient denies rash, jaundice, lower extremity edema, abdominal distention, lethargy or confusion.      No history of melena, hematemesis or hematochezia.      The patient denies any fevers, sweats or chills.  Weight has been stable.      The patient had 1 glass of wine at [place] time.  One of her questions relates to alcohol use in history of PBC.       Medical hx Surgical hx   Past Medical History:   Diagnosis Date     Dyslipidemia      Eczema      Need for prophylactic hormone replacement therapy (postmenopausal)      Obesity      Unspecified essential hypertension      Unspecified hypothyroidism       Past Surgical History:   Procedure Laterality Date     APPENDECTOMY       BREAST BIOPSY, RT/LT  Right 1970     COLPORRHAPHY ANTERIOR  12/6/2010    COLPORRHAPHY ANTERIOR performed by MAY RIVERA at [facility]     CYSTOCELE REPAIR  2007     HYSTERECTOMY, JOSEE  1978    Hysterectomy     INJECT EPIDURAL LUMBAR  10/19/2011    Procedure:INJECT EPIDURAL LUMBAR; DESHAWN-; Surgeon:GENERIC ANESTHESIA PROVIDER; Location:[facility]     INJECT JOINT SACROILIAC  2/7/2011    INJECT JOINT SACROILIAC performed by GENERIC ANESTHESIA PROVIDER at [facility]     INJECT JOINT SACROILIAC  4/25/2012    Procedure:INJECT JOINT SACROILIAC; DESHAWN SI Joint --; Surgeon:GENERIC ANESTHESIA PROVIDER; Location:[facility]     PHACOEMULSIFICATION WITH STANDARD INTRAOCULAR LENS IMPLANT  5/26/2011    Procedure:PHACOEMULSIFICATION WITH STANDARD INTRAOCULAR LENS IMPLANT; Surgeon:JOSÉ MANUEL DORSEY; Location:[facility]     PHACOEMULSIFICATION WITH STANDARD INTRAOCULAR LENS IMPLANT  6/9/2011    Procedure:PHACOEMULSIFICATION WITH STANDARD INTRAOCULAR LENS IMPLANT; Surgeon:JOSÉ MANUEL DORSEY; Location:[facility]     RECTOCELE REPAIR  2007     SURGICAL HISTORY OF -       bilateral foot surgery; subtalar joint fusion; bunion     SURGICAL HISTORY OF -   2009    Posterior colporrhaphy w/ posterior Pro-Lift     TONSILLECTOMY            Medications  Prior to Admission medications    Medication Sig Start Date End Date Taking? Authorizing Provider   ursodiol 500 MG TABS Take 1 tablet by mouth 3 times daily 12/27/17  Yes Dereck Way MD   levothyroxine (SYNTHROID/LEVOTHROID) 125 MCG tablet Take 1 tablet (125 mcg) by mouth daily 12/21/17  Yes Aliyah Aggarwal MD   losartan (COZAAR) 50 MG tablet Take 1 tablet (50 mg) by mouth daily 10/2/17  Yes Aliyah Aggarwal MD   ranitidine (ZANTAC) 150 MG tablet Take 1 tablet (150 mg) by mouth 2 times daily 10/2/17  Yes Aliyah Aggarwal MD   Omega-3 Fatty Acids (OMEGA 3 PO) Take by mouth daily   Yes Reported, Patient   propylene glycol (SYSTANE BALANCE) 0.6 % SOLN ophthalmic solution 1 drop   Yes Reported,  "Patient   nabumetone (RELAFEN) 750 MG tablet Take 1 tablet (750 mg) by mouth 2 times daily as needed for moderate pain 6/20/17  Yes Luan Rader, BABAR   Calcium Carbonate-Vit D-Min (CALCIUM 1200 PO) Take 1 tablet by mouth daily   Yes Reported, Patient   Diphenhydramine-APAP, sleep, (TYLENOL PM EXTRA STRENGTH PO) Take by mouth At Bedtime   Yes Reported, Patient   Cholecalciferol (VITAMIN D) 2000 UNITS CAPS Take  by mouth daily.   Yes Reported, Patient   ASPIRIN 81 MG OR TABS 1 TABLET BY MOUTH DAILY 1/8/07  Yes Sara Jordan MD       Allergies  Allergies   Allergen Reactions     Ace Inhibitors Cough     Amlodipine Besylate      Swellings       Dilaudid [Hydromorphone Hcl] Hives     Percocet [Oxycodone-Acetaminophen] Other (See Comments)     Severe dizziness         Review of systems  A 10-point review of systems was negative    Examination  /75 (Cuff Size: Adult Regular)  Pulse 72  Resp 18  Ht 1.594 m (5' 2.75\")  Wt 97.3 kg (214 lb 9.6 oz)  BMI 38.32 kg/m2  Body mass index is 38.32 kg/(m^2).    Gen- well, NAD, A+Ox3, normal color  Skin- no rash or jaundice    Laboratory  Lab Results   Component Value Date     12/19/2017    POTASSIUM 3.9 12/19/2017    CHLORIDE 104 12/19/2017    CO2 25 12/19/2017    BUN 14 12/19/2017    CR 0.66 12/19/2017       Lab Results   Component Value Date    BILITOTAL 0.4 12/19/2017    ALT 32 12/19/2017    AST 29 12/19/2017    ALKPHOS 273 12/19/2017       Lab Results   Component Value Date    ALBUMIN 3.2 12/19/2017    PROTTOTAL 7.6 12/19/2017        Lab Results   Component Value Date    WBC 5.7 12/19/2017    HGB 12.0 12/19/2017    MCV 88 12/19/2017     12/19/2017       Lab Results   Component Value Date    INR 0.99 12/19/2017       Radiology  Nil new    Assessment  72-year-old female with history of dyslipidemia and hypothyroidism who presents for follow-up of recently diagnosed primary biliary cholangitis.  No clinical, biochemical or radiographic evidence of " cirrhosis.  Will obtain Fibrosis Scan to objectively stage the patient's liver disease.  The patient finds the co-pay for ursodiol significantly expensive despite trying to purchase it in [place] and will try to obtain her medications from [place] instead.  Will continue to use the topical Benadryl for itch but sertraline can be used if her symptoms progress.     Plan  1.  Ursodiol 500mg PO TID  2.  Benadryl TOP PRN for itch  3.  Fibrosis Scan today  4.  Weight loss with diet and exercise  5.  Rare ETOH use OK  6.  Follow-up in 6 months    Dereck Aguilar MD  Hepatology  [facility]

## 2018-02-14 DIAGNOSIS — E03.9 HYPOTHYROIDISM, UNSPECIFIED TYPE: Primary | ICD-10-CM

## 2018-02-14 NOTE — TELEPHONE ENCOUNTER
"Requested Prescriptions   Pending Prescriptions Disp Refills     levothyroxine (SYNTHROID/LEVOTHROID) 125 MCG tablet [Pharmacy Med Name: LEVOTHYROXIN 125MCG TAB SAND] 30 tablet 0     Sig: TAKE ONE TABLET BY MOUTH ONE TIME DAILY    Thyroid Protocol Passed    2/14/2018 10:47 AM       Passed - Patient is 12 years or older       Passed - Recent or future visit with authorizing provider's specialty    Patient had office visit in the last year or has a visit in the next 30 days with authorizing provider.  See \"Patient Info\" tab in inbasket, or \"Choose Columns\" in Meds & Orders section of the refill encounter.            Passed - Normal TSH on file in past 12 months    Recent Labs   Lab Test  01/30/18   0830   TSH  2.56             Passed - No active pregnancy on record    If patient is pregnant or has had a positive pregnancy test, please check TSH.         Passed - No positive pregnancy test in past 12 months    If patient is pregnant or has had a positive pregnancy test, please check TSH.          Last Written Prescription Date:  12/21/17  Last Fill Quantity: 30,  # refills: 1   Last office visit: 10/5/2017 with prescribing provider:  10/5/17   Future Office Visit:      "

## 2018-02-15 RX ORDER — LEVOTHYROXINE SODIUM 125 UG/1
TABLET ORAL
Qty: 90 TABLET | Refills: 3 | Status: SHIPPED | OUTPATIENT
Start: 2018-02-15 | End: 2021-12-06

## 2018-04-11 ENCOUNTER — OFFICE VISIT (OUTPATIENT)
Dept: FAMILY MEDICINE | Facility: CLINIC | Age: 73
End: 2018-04-11
Payer: COMMERCIAL

## 2018-04-11 VITALS
WEIGHT: 217.8 LBS | HEIGHT: 63 IN | DIASTOLIC BLOOD PRESSURE: 70 MMHG | RESPIRATION RATE: 16 BRPM | TEMPERATURE: 97.1 F | OXYGEN SATURATION: 97 % | SYSTOLIC BLOOD PRESSURE: 137 MMHG | HEART RATE: 90 BPM | BODY MASS INDEX: 38.59 KG/M2

## 2018-04-11 DIAGNOSIS — J01.00 ACUTE NON-RECURRENT MAXILLARY SINUSITIS: Primary | ICD-10-CM

## 2018-04-11 PROCEDURE — 99213 OFFICE O/P EST LOW 20 MIN: CPT | Performed by: FAMILY MEDICINE

## 2018-04-11 RX ORDER — AZITHROMYCIN 250 MG/1
TABLET, FILM COATED ORAL
Qty: 6 TABLET | Refills: 0 | Status: SHIPPED | OUTPATIENT
Start: 2018-04-11 | End: 2018-07-27

## 2018-04-11 NOTE — MR AVS SNAPSHOT
After Visit Summary   4/11/2018    Gail Dewitt    MRN: [id number]           Patient Information     Date Of Birth          1945        Visit Information        Provider Department      4/11/2018 9:00 AM Quinton Benitez MD [facility]        Today's Diagnoses     Acute non-recurrent maxillary sinusitis    -  1      Care Instructions    1. This might well be sinusitis    Could be influenza    2. Use the antibiotic    3. Call if not better.           Follow-ups after your visit        Your next 10 appointments already scheduled     Aug 20, 2018  9:00 AM CDT   Lab with  LAB   [facility] Lab ([facility])    [address]  1st Floor  [facility] [id number] [phone]            Aug 20, 2018 10:00 AM CDT   (Arrive by 9:45 AM)   Return General Liver with Dereck Key MD   [facility] Hepatology ([facility])    [address]  [address]  [facility] [address] [phone]              Who to contact     If you have questions or need follow up information about today's clinic visit or your schedule please contact [facility] directly at [phone].  Normal or non-critical lab and imaging results will be communicated to you by MyChart, letter or phone within 4 business days after the clinic has received the results. If you do not hear from us within 7 days, please contact the clinic through MyChart or phone. If you have a critical or abnormal lab result, we will notify you by phone as soon as possible.  Submit refill requests through [employer] or call your pharmacy and they will forward the refill request to us. Please allow 3 business days for your refill to be completed.          Additional Information About Your Visit        MyChart Information     [employer] gives you secure access to your electronic health record. If you see a primary care provider, you can also send  "messages to your care team and make appointments. If you have questions, please call your primary care clinic.  If you do not have a primary care provider, please call [phone] and they will assist you.        Care EveryWhere ID     This is your Care EveryWhere ID. This could be used by other organizations to access your [place] medical records  [id number]        Your Vitals Were     Pulse Temperature Respirations Height Pulse Oximetry BMI (Body Mass Index)    90 97.1  F (36.2  C) (Oral) 16 5' 2.75\" (1.594 m) 97% 38.89 kg/m2       Blood Pressure from Last 3 Encounters:   04/11/18 137/70   02/05/18 147/75   12/19/17 133/78    Weight from Last 3 Encounters:   04/11/18 217 lb 12.8 oz (98.8 kg)   02/05/18 214 lb 9.6 oz (97.3 kg)   12/19/17 215 lb (97.5 kg)              Today, you had the following     No orders found for display         Today's Medication Changes          These changes are accurate as of 4/11/18  9:17 AM.  If you have any questions, ask your nurse or doctor.               Start taking these medicines.        Dose/Directions    azithromycin 250 MG tablet   Commonly known as:  ZITHROMAX   Used for:  Acute non-recurrent maxillary sinusitis   Started by:  Quinton Benitez MD        Two tablets first day, then one tablet daily for four days.   Quantity:  6 tablet   Refills:  0            Where to get your medicines      These medications were sent to [id number] [address] [facility]  [address] [place] [address]    Hours:  Closed 10-16-08 business to [facility] Phone:  [phone]     azithromycin 250 MG tablet                Primary Care Provider Office Phone # Fax #    Aliyah Aggarwal [phone] [address] [facility] [address]        Equal Access to Services     BRANDI SARAVIA AH: Silvia Barrera, waaxda luqadaha, qaybta kaalmaria del carmen alonso. So [facility] " [phone].    ATENCIÓN: Si raz anguiano, tiene a cates disposición servicios gratuitos de asistencia lingüística. Prateek parnell [phone].    We comply with applicable federal civil rights laws and [place] laws. We do not discriminate on the basis of race, color, national origin, age, disability, sex, sexual orientation, or gender identity.            Thank you!     Thank you for choosing [facility]  for your care. Our goal is always to provide you with excellent care. Hearing back from our patients is one way we can continue to improve our services. Please take a few minutes to complete the written survey that you may receive in the mail after your visit with us. Thank you!             Your Updated Medication List - Protect others around you: Learn how to safely use, store and throw away your medicines at www.disposemymeds.org.          This list is accurate as of 4/11/18  9:17 AM.  Always use your most recent med list.                   Brand Name Dispense Instructions for use Diagnosis    aspirin 81 MG tablet     100    1 TABLET BY MOUTH DAILY    Essential hypertension, benign       azithromycin 250 MG tablet    ZITHROMAX    6 tablet    Two tablets first day, then one tablet daily for four days.    Acute non-recurrent maxillary sinusitis       CALCIUM 1200 PO      Take 1 tablet by mouth daily        levothyroxine 125 MCG tablet    SYNTHROID/LEVOTHROID    90 tablet    TAKE ONE TABLET BY MOUTH ONE TIME DAILY    Hypothyroidism, unspecified type       losartan 50 MG tablet    COZAAR    90 tablet    Take 1 tablet (50 mg) by mouth daily    Benign essential hypertension       nabumetone 750 MG tablet    RELAFEN    30 tablet    Take 1 tablet (750 mg) by mouth 2 times daily as needed for moderate pain    Acute pain of right knee       OMEGA 3 PO      Take by mouth daily        propylene glycol 0.6 % Soln ophthalmic solution    SYSTANE BALANCE     1 drop        ranitidine 150 MG tablet    ZANTAC    60  tablet    Take 1 tablet (150 mg) by mouth 2 times daily    Gastroesophageal reflux disease without esophagitis       TYLENOL PM EXTRA STRENGTH PO      Take by mouth At Bedtime    Pain of left lower extremity       ursodiol 500 MG Tabs     180 tablet    Take 1 tablet by mouth 3 times daily    Primary biliary cholangitis (H)       vitamin D 2000 units Caps      Take  by mouth daily.           Maira Wong  (RN)  2018 00:10:13

## 2018-04-11 NOTE — PROGRESS NOTES
SUBJECTIVE:   Gail Dewitt is a 73 year old female who presents to clinic today for the following health issues:      Acute Illness this lady comes in with coughing and a good deal of nasal and throat discharge.  She feels very weak not been able to go to work has been on the couch for the last several days.  She has had the influenza injection this year.  She denies fever or chills.   Acute illness concerns: Coughing  Onset: Started  4/5/18    Fever: no    Chills/Sweats: YES    Headache (location?): no    Sinus Pressure:no    Conjunctivitis:  YES: both    Ear Pain: no    Rhinorrhea: YES, bloody nose    Congestion: YES    Sore Throat: no     Cough: YES-productive of green sputum    Wheeze: no    Decreased Appetite: no    Nausea: no    Vomiting: no    Diarrhea:  no    Dysuria/Freq.: no    Fatigue/Achiness: YES    Sick/Strep Exposure: no     Therapies Tried and outcome: None          Problem list and histories reviewed & adjusted, as indicated.  Additional history: as documented    Patient Active Problem List   Diagnosis     Hypothyroidism     Other psoriasis     TIBIAL MUSCULAR ATROPHY     Microscopic Hematuria     CARDIOVASCULAR SCREENING; LDL GOAL LESS THAN 130     Cold sore     GERD (gastroesophageal reflux disease)     Hip pain     Senile nuclear sclerosis     Family history of breast cancer     Advanced directives, counseling/discussion     Left shoulder pain     Benign essential hypertension     Chronic left shoulder pain     Fatty liver     Past Surgical History:   Procedure Laterality Date     APPENDECTOMY       BREAST BIOPSY, RT/LT Right 1970     COLPORRHAPHY ANTERIOR  12/6/2010    COLPORRHAPHY ANTERIOR performed by MAY RIVERA at [facility]     CYSTOCELE REPAIR  2007     HYSTERECTOMY, JOSEE  1978    Hysterectomy     INJECT EPIDURAL LUMBAR  10/19/2011    Procedure:INJECT EPIDURAL LUMBAR; DESHAWN-; Surgeon:GENERIC ANESTHESIA PROVIDER; Location:[facility]     INJECT JOINT SACROILIAC  2/7/2011    INJECT  JOINT SACROILIAC performed by GENERIC ANESTHESIA PROVIDER at [facility]     INJECT JOINT SACROILIAC  4/25/2012    Procedure:INJECT JOINT SACROILIAC; DESHAWN SI Joint --; Surgeon:GENERIC ANESTHESIA PROVIDER; Location:[facility]     PHACOEMULSIFICATION WITH STANDARD INTRAOCULAR LENS IMPLANT  5/26/2011    Procedure:PHACOEMULSIFICATION WITH STANDARD INTRAOCULAR LENS IMPLANT; Surgeon:JOSÉ MANUEL DORSEY; Location:[facility]     PHACOEMULSIFICATION WITH STANDARD INTRAOCULAR LENS IMPLANT  6/9/2011    Procedure:PHACOEMULSIFICATION WITH STANDARD INTRAOCULAR LENS IMPLANT; Surgeon:JOSÉ MANUEL DORSEY; Location:[facility]     RECTOCELE REPAIR  2007     SURGICAL HISTORY OF -       bilateral foot surgery; subtalar joint fusion; bunion     SURGICAL HISTORY OF -   2009    Posterior colporrhaphy w/ posterior Pro-Lift     TONSILLECTOMY         Social History   Substance Use Topics     Smoking status: Former Smoker     Years: 20.00     Quit date: 9/23/1985     Smokeless tobacco: Never Used     Alcohol use Yes      Comment: 1 Q 1-2 weeks     Family History   Problem Relation Age of Onset     Cardiovascular Brother      Hypertension Brother      Thyroid Disease Brother      Cardiovascular Paternal Grandmother      HEART DISEASE Paternal Grandmother      RIP MI age 71     Cardiovascular Paternal Grandfather      C.A.D. Mother      Hypertension Mother      Breast Cancer Mother      Alcohol/Drug Mother      Allergies Mother      Depression Mother      OSTEOPOROSIS Mother      Breast Cancer Maternal Grandmother      Breast Cancer Sister      OSTEOPOROSIS Sister      Liver Disease Sister      PBC     Thyroid Disease Father      Cardiovascular Maternal Grandfather      HEART DISEASE Maternal Grandfather      RIP MI, age 81     Neurologic Disorder Son      Obesity Daughter      GASTROINTESTINAL DISEASE Daughter      gallbladder surg     Thyroid Disease Son          Current Outpatient Prescriptions   Medication Sig Dispense Refill     azithromycin (ZITHROMAX)  "250 MG tablet Two tablets first day, then one tablet daily for four days. 6 tablet 0     levothyroxine (SYNTHROID/LEVOTHROID) 125 MCG tablet TAKE ONE TABLET BY MOUTH ONE TIME DAILY 90 tablet 3     ursodiol 500 MG TABS Take 1 tablet by mouth 3 times daily 180 tablet 4     losartan (COZAAR) 50 MG tablet Take 1 tablet (50 mg) by mouth daily 90 tablet 3     ranitidine (ZANTAC) 150 MG tablet Take 1 tablet (150 mg) by mouth 2 times daily 60 tablet 1     Omega-3 Fatty Acids (OMEGA 3 PO) Take by mouth daily       propylene glycol (SYSTANE BALANCE) 0.6 % SOLN ophthalmic solution 1 drop       nabumetone (RELAFEN) 750 MG tablet Take 1 tablet (750 mg) by mouth 2 times daily as needed for moderate pain 30 tablet 1     Calcium Carbonate-Vit D-Min (CALCIUM 1200 PO) Take 1 tablet by mouth daily       Diphenhydramine-APAP, sleep, (TYLENOL PM EXTRA STRENGTH PO) Take by mouth At Bedtime       Cholecalciferol (VITAMIN D) 2000 UNITS CAPS Take  by mouth daily.       ASPIRIN 81 MG OR TABS 1 TABLET BY MOUTH DAILY 100 3     Allergies   Allergen Reactions     Ace Inhibitors Cough     Amlodipine Besylate      Swellings       Dilaudid [Hydromorphone Hcl] Hives     Percocet [Oxycodone-Acetaminophen] Other (See Comments)     Severe dizziness         Reviewed and updated as needed this visit by clinical staff  Tobacco  Allergies  Meds  Med Hx  Surg Hx  Fam Hx  Soc Hx      Reviewed and updated as needed this visit by Provider         ROS:  CONSTITUTIONAL: NEGATIVE for fever, chills, change in weight  ENT/MOUTH: NEGATIVE for ear, mouth and throat problems  RESP: NEGATIVE for significant cough or SOB  CV: NEGATIVE for chest pain, palpitations or peripheral edema    OBJECTIVE:     /70  Pulse 90  Temp 97.1  F (36.2  C) (Oral)  Resp 16  Ht 5' 2.75\" (1.594 m)  Wt 217 lb 12.8 oz (98.8 kg)  SpO2 97%  BMI 38.89 kg/m2  Body mass index is 38.89 kg/(m^2).  GENERAL: healthy, alert and no distress  NECK: no adenopathy, no asymmetry, masses, " or scars and thyroid normal to palpation  RESP: lungs clear to auscultation - no rales, rhonchi or wheezes  CV: regular rate and rhythm, normal S1 S2, no S3 or S4, no murmur, click or rub, no peripheral edema and peripheral pulses strong  ABDOMEN: soft, nontender, no hepatosplenomegaly, no masses and bowel sounds normal  MS: no gross musculoskeletal defects noted, no edema        ASSESSMENT/PLAN:             1. Acute non-recurrent maxillary sinusitis    - azithromycin (ZITHROMAX) 250 MG tablet; Two tablets first day, then one tablet daily for four days.  Dispense: 6 tablet; Refill: 0    ASSESSMENT/PLAN:      ICD-10-CM    1. Acute non-recurrent maxillary sinusitis J01.00 azithromycin (ZITHROMAX) 250 MG tablet       Patient Instructions   1. This might well be sinusitis    Could be influenza    2. Use the antibiotic    3. Call if not better.           Quinton Benitez MD  [facility]

## 2018-04-11 NOTE — PATIENT INSTRUCTIONS
1. This might well be sinusitis    Could be influenza    2. Use the antibiotic    3. Call if not better.

## 2018-04-11 NOTE — NURSING NOTE
"Chief Complaint   Patient presents with     Cough     Started 4/5/18, productive cough of green phlem       Initial /70  Pulse 90  Temp 97.1  F (36.2  C) (Oral)  Resp 16  Ht 5' 2.75\" (1.594 m)  Wt 217 lb 12.8 oz (98.8 kg)  SpO2 97%  BMI 38.89 kg/m2 Estimated body mass index is 38.89 kg/(m^2) as calculated from the following:    Height as of this encounter: 5' 2.75\" (1.594 m).    Weight as of this encounter: 217 lb 12.8 oz (98.8 kg).  Medication Reconciliation: complete    "

## 2018-04-17 ENCOUNTER — TELEPHONE (OUTPATIENT)
Dept: FAMILY MEDICINE | Facility: CLINIC | Age: 73
End: 2018-04-17

## 2018-04-17 NOTE — TELEPHONE ENCOUNTER
Reason for call:  Patient reporting a symptom    Symptom or request: Gail says she was seen 4/11 by Dr Benitez for sinus problem and was given azithromycin (ZITHROMAX) 250 MG tablet. She says since Friday she has had very severe diarrhea and she is afraid to eat anything. She finished the medication on Sunday and the sinus problem is better.     Duration (how long have symptoms been present): Since Friday 4/13     Phone Number patient can be reached at:  Home number on file [phone] (home)    Best Time:  anytime    Can we leave a detailed message on this number:  YES    Call taken on 4/17/2018 at 9:44 AM by Merry Fitzgerald

## 2018-07-26 DIAGNOSIS — K76.0 FATTY LIVER: Primary | ICD-10-CM

## 2018-07-27 ENCOUNTER — OFFICE VISIT (OUTPATIENT)
Dept: FAMILY MEDICINE | Facility: CLINIC | Age: 73
End: 2018-07-27
Payer: COMMERCIAL

## 2018-07-27 VITALS — SYSTOLIC BLOOD PRESSURE: 134 MMHG | TEMPERATURE: 97.3 F | HEART RATE: 74 BPM | DIASTOLIC BLOOD PRESSURE: 60 MMHG

## 2018-07-27 DIAGNOSIS — M75.42 IMPINGEMENT SYNDROME OF SHOULDER REGION, LEFT: Primary | ICD-10-CM

## 2018-07-27 PROCEDURE — 20610 DRAIN/INJ JOINT/BURSA W/O US: CPT | Mod: LT | Performed by: PHYSICIAN ASSISTANT

## 2018-07-27 RX ORDER — TRIAMCINOLONE ACETONIDE 40 MG/ML
40 INJECTION, SUSPENSION INTRA-ARTICULAR; INTRAMUSCULAR ONCE
Qty: 2 ML | Refills: 0 | COMMUNITY
Start: 2018-07-27 | End: 2019-09-03

## 2018-07-27 ASSESSMENT — ENCOUNTER SYMPTOMS
NEUROLOGICAL NEGATIVE: 1
NERVOUS/ANXIOUS: 0
WEIGHT LOSS: 0
SENSORY CHANGE: 0
HEADACHES: 0
TINGLING: 0
FEVER: 0
SORE THROAT: 0
PALPITATIONS: 0
ABDOMINAL PAIN: 0
SHORTNESS OF BREATH: 0
NAUSEA: 0
DIARRHEA: 0
EYE REDNESS: 0
SEIZURES: 0
CONSTIPATION: 0
MYALGIAS: 0
FOCAL WEAKNESS: 0
BLOOD IN STOOL: 0
DOUBLE VISION: 0
WHEEZING: 0
DIAPHORESIS: 0
DYSURIA: 0
HEMOPTYSIS: 0
DEPRESSION: 0
BLURRED VISION: 0
ORTHOPNEA: 0
PHOTOPHOBIA: 0
COUGH: 0
EYE DISCHARGE: 0
LOSS OF CONSCIOUSNESS: 0
INSOMNIA: 0
DIZZINESS: 0
HALLUCINATIONS: 0
EYE PAIN: 0
VOMITING: 0
BACK PAIN: 0
WEAKNESS: 0
HEARTBURN: 0
NECK PAIN: 0
FREQUENCY: 0
SPUTUM PRODUCTION: 0

## 2018-07-27 ASSESSMENT — LIFESTYLE VARIABLES: SUBSTANCE_ABUSE: 0

## 2018-07-27 NOTE — MR AVS SNAPSHOT
After Visit Summary   7/27/2018    Gail Dewitt    MRN: [id number]           Patient Information     Date Of Birth          1945        Visit Information        Provider Department      7/27/2018 2:40 PM Luan Rader PA-C [facility]        Today's Diagnoses     Impingement syndrome of shoulder region, left    -  1       Follow-ups after your visit        Follow-up notes from your care team     Return if symptoms worsen or fail to improve.      Your next 10 appointments already scheduled     Sep 04, 2018  9:30 AM CDT   Lab with  LAB   [facility] ([facility])    [address]  1st Floor  [facility] [id number] [phone]            Sep 04, 2018 10:30 AM CDT   (Arrive by 10:15 AM)   Return General Liver with Dereck Key MD   [facility] ([facility])    [address] [place] [address]  [address]  [facility] [address] [phone]              Future tests that were ordered for you today     Open Future Orders        Priority Expected Expires Ordered    Hepatic panel Routine 9/4/2018 7/26/2019 7/26/2018    Basic metabolic panel Routine 9/4/2018 7/26/2019 7/26/2018    INR Routine 9/4/2018 7/26/2019 7/26/2018    CBC with platelets Routine 9/4/2018 7/26/2019 7/26/2018            Who to contact     If you have questions or need follow up information about today's clinic visit or your schedule please contact [facility] directly at [phone].  Normal or non-critical lab and imaging results will be communicated to you by MyChart, letter or phone within 4 business days after the clinic has received the results. If you do not hear from us within 7 days, please contact the clinic through MyChart or phone. If you have a critical or abnormal lab result, we will notify you by phone as soon as possible.  Submit refill requests through [employer] or call your pharmacy and they will forward the  refill request to us. Please allow 3 business days for your refill to be completed.          Additional Information About Your Visit        MyChart Information     [employer] gives you secure access to your electronic health record. If you see a primary care provider, you can also send messages to your care team and make appointments. If you have questions, please call your primary care clinic.  If you do not have a primary care provider, please call [phone] and they will assist you.        Care EveryWhere ID     This is your Care EveryWhere ID. This could be used by other organizations to access your [place] medical records  [id number]        Your Vitals Were     Pulse Temperature                74 97.3  F (36.3  C) (Tympanic)           Blood Pressure from Last 3 Encounters:   07/27/18 134/60   04/11/18 137/70   02/05/18 147/75    Weight from Last 3 Encounters:   04/11/18 217 lb 12.8 oz (98.8 kg)   02/05/18 214 lb 9.6 oz (97.3 kg)   12/19/17 215 lb (97.5 kg)              We Performed the Following     DRAIN/INJECT LARGE JOINT/BURSA     KENALOG PER 10 MG        Primary Care Provider Office Phone # Fax #    Aliyah Aggarwal [phone] [address] [place], [place] [id number]        Equal Access to Services     BRANDI SARAVIA : Hadii aad ku hadasho Soomaali, waaxda luqadaha, qaybta kaalmada adeegyada, maria del carmen luz. So [facility] [phone].    ATENCIÓN: Si habla español, tiene a cates disposición servicios gratuitos de asistencia lingüística. Llame al [phone].    We comply with applicable federal civil rights laws and [place] laws. We do not discriminate on the basis of race, color, national origin, age, disability, sex, sexual orientation, or gender identity.            Thank you!     Thank you for choosing [facility]  for your care. Our goal is always to provide you with excellent care. Hearing back from our patients is one way we can  continue to improve our services. Please take a few minutes to complete the written survey that you may receive in the mail after your visit with us. Thank you!             Your Updated Medication List - Protect others around you: Learn how to safely use, store and throw away your medicines at www.disposemymeds.org.          This list is accurate as of 7/27/18  3:34 PM.  Always use your most recent med list.                   Brand Name Dispense Instructions for use Diagnosis    aspirin 81 MG tablet     100    1 TABLET BY MOUTH DAILY    Essential hypertension, benign       CALCIUM 1200 PO      Take 1 tablet by mouth daily        levothyroxine 125 MCG tablet    SYNTHROID/LEVOTHROID    90 tablet    TAKE ONE TABLET BY MOUTH ONE TIME DAILY    Hypothyroidism, unspecified type       losartan 50 MG tablet    COZAAR    90 tablet    Take 1 tablet (50 mg) by mouth daily    Benign essential hypertension       OMEGA 3 PO      Take by mouth daily        propylene glycol 0.6 % Soln ophthalmic solution    SYSTANE BALANCE     1 drop        ranitidine 150 MG tablet    ZANTAC    60 tablet    Take 1 tablet (150 mg) by mouth 2 times daily    Gastroesophageal reflux disease without esophagitis       triamcinolone acetonide 40 MG/ML injection    KENALOG-40    2 mL    1 mL (40 mg) by INTRA-ARTICULAR route once for 1 dose        TYLENOL PM EXTRA STRENGTH PO      Take by mouth At Bedtime    Pain of left lower extremity       ursodiol 500 MG Tabs     180 tablet    Take 1 tablet by mouth 3 times daily    Primary biliary cholangitis (H)       vitamin D 2000 units Caps      Take  by mouth daily.

## 2018-07-27 NOTE — NURSING NOTE
"Chief Complaint   Patient presents with     Shoulder       Initial /60 (BP Location: Right arm, Patient Position: Sitting, Cuff Size: Adult Large)  Pulse 74  Temp 97.3  F (36.3  C) (Tympanic) Estimated body mass index is 38.89 kg/(m^2) as calculated from the following:    Height as of 4/11/18: 5' 2.75\" (1.594 m).    Weight as of 4/11/18: 217 lb 12.8 oz (98.8 kg).      Health Maintenance that is potentially due pending provider review:  NONE    n/a    Is there anyone who you would like to be able to receive your results? No  If yes have patient fill out AMOR    Shabana CASTELLANOS CMA    "

## 2018-07-27 NOTE — PROGRESS NOTES
HPI    SUBJECTIVE:   Gail Dewitt is a 73 year old female who presents to clinic today for recurrent left shoulder pain.  She had an injection approximately 20 months ago and states that her shoulder pain was virtually resolved during much of that time.  Her shoulder pain has returned and she would like another injection    Musculoskeletal problem/pain      Duration: Few days     Description  Location: Left shoulder    Intensity:  moderate    Accompanying signs and symptoms: none    History  Previous similar problem: YES  Previous evaluation:  none    Precipitating or alleviating factors:  Trauma or overuse: YES  Aggravating factors include: overuse    Therapies tried and outcome: nothing    Problem list and histories reviewed & adjusted, as indicated.  Additional history: as documented    Patient Active Problem List   Diagnosis     Hypothyroidism     Other psoriasis     TIBIAL MUSCULAR ATROPHY     Microscopic Hematuria     CARDIOVASCULAR SCREENING; LDL GOAL LESS THAN 130     Cold sore     GERD (gastroesophageal reflux disease)     Hip pain     Senile nuclear sclerosis     Family history of breast cancer     Advanced directives, counseling/discussion     Left shoulder pain     Benign essential hypertension     Chronic left shoulder pain     Fatty liver     Past Surgical History:   Procedure Laterality Date     APPENDECTOMY       BREAST BIOPSY, RT/LT Right 1970     COLPORRHAPHY ANTERIOR  12/6/2010    COLPORRHAPHY ANTERIOR performed by MAY RIVERA at [facility]     CYSTOCELE REPAIR  2007     HYSTERECTOMY, JOSEE  1978    Hysterectomy     INJECT EPIDURAL LUMBAR  10/19/2011    Procedure:INJECT EPIDURAL LUMBAR; DESHAWN-; Surgeon:GENERIC ANESTHESIA PROVIDER; Location:[facility]     INJECT JOINT SACROILIAC  2/7/2011    INJECT JOINT SACROILIAC performed by GENERIC ANESTHESIA PROVIDER at [facility]     INJECT JOINT SACROILIAC  4/25/2012    Procedure:INJECT JOINT SACROILIAC; DESHAWN SI Joint --; Surgeon:GENERIC ANESTHESIA  PROVIDER; Location:[facility]     PHACOEMULSIFICATION WITH STANDARD INTRAOCULAR LENS IMPLANT  5/26/2011    Procedure:PHACOEMULSIFICATION WITH STANDARD INTRAOCULAR LENS IMPLANT; Surgeon:JOSÉ MANUEL DORSEY; Location:[facility]     PHACOEMULSIFICATION WITH STANDARD INTRAOCULAR LENS IMPLANT  6/9/2011    Procedure:PHACOEMULSIFICATION WITH STANDARD INTRAOCULAR LENS IMPLANT; Surgeon:JOSÉ MANUEL DORSEY; Location:[facility]     RECTOCELE REPAIR  2007     SURGICAL HISTORY OF -       bilateral foot surgery; subtalar joint fusion; bunion     SURGICAL HISTORY OF -   2009    Posterior colporrhaphy w/ posterior Pro-Lift     TONSILLECTOMY         Social History   Substance Use Topics     Smoking status: Former Smoker     Years: 20.00     Quit date: 9/23/1985     Smokeless tobacco: Never Used     Alcohol use Yes      Comment: 1 Q 1-2 weeks     Family History   Problem Relation Age of Onset     Cardiovascular Brother      Hypertension Brother      Thyroid Disease Brother      Cardiovascular Paternal Grandmother      HEART DISEASE Paternal Grandmother      RIP MI age 71     Cardiovascular Paternal Grandfather      C.A.D. Mother      Hypertension Mother      Breast Cancer Mother      Alcohol/Drug Mother      Allergies Mother      Depression Mother      Osteoperosis Mother      Breast Cancer Maternal Grandmother      Breast Cancer Sister      Osteoperosis Sister      Liver Disease Sister      PBC     Thyroid Disease Father      Cardiovascular Maternal Grandfather      HEART DISEASE Maternal Grandfather      RIP MI, age 81     Neurologic Disorder Son      Obesity Daughter      GASTROINTESTINAL DISEASE Daughter      gallbladder surg     Thyroid Disease Son          Current Outpatient Prescriptions   Medication Sig Dispense Refill     ASPIRIN 81 MG OR TABS 1 TABLET BY MOUTH DAILY 100 3     Calcium Carbonate-Vit D-Min (CALCIUM 1200 PO) Take 1 tablet by mouth daily       Cholecalciferol (VITAMIN D) 2000 UNITS CAPS Take  by mouth daily.        Diphenhydramine-APAP, sleep, (TYLENOL PM EXTRA STRENGTH PO) Take by mouth At Bedtime       levothyroxine (SYNTHROID/LEVOTHROID) 125 MCG tablet TAKE ONE TABLET BY MOUTH ONE TIME DAILY 90 tablet 3     losartan (COZAAR) 50 MG tablet Take 1 tablet (50 mg) by mouth daily 90 tablet 3     Omega-3 Fatty Acids (OMEGA 3 PO) Take by mouth daily       propylene glycol (SYSTANE BALANCE) 0.6 % SOLN ophthalmic solution 1 drop       ranitidine (ZANTAC) 150 MG tablet Take 1 tablet (150 mg) by mouth 2 times daily 60 tablet 1     triamcinolone acetonide (KENALOG-40) 40 MG/ML injection 1 mL (40 mg) by INTRA-ARTICULAR route once for 1 dose 2 mL 0     ursodiol 500 MG TABS Take 1 tablet by mouth 3 times daily 180 tablet 4     Allergies   Allergen Reactions     Ace Inhibitors Cough     Amlodipine Besylate      Swellings       Dilaudid [Hydromorphone Hcl] Hives     Percocet [Oxycodone-Acetaminophen] Other (See Comments)     Severe dizziness       Labs reviewed in EPIC    Reviewed and updated as needed this visit by clinical staff       Reviewed and updated as needed this visit by Provider       Review of Systems   Constitutional: Negative for diaphoresis, fever, malaise/fatigue and weight loss.   HENT: Negative for congestion, ear discharge, ear pain, hearing loss, nosebleeds and sore throat.    Eyes: Negative for blurred vision, double vision, photophobia, pain, discharge and redness.   Respiratory: Negative for cough, hemoptysis, sputum production, shortness of breath and wheezing.    Cardiovascular: Negative for chest pain, palpitations, orthopnea and leg swelling.   Gastrointestinal: Negative for abdominal pain, blood in stool, constipation, diarrhea, heartburn, melena, nausea and vomiting.   Genitourinary: Negative.  Negative for dysuria, frequency and urgency.   Musculoskeletal: Positive for joint pain. Negative for back pain, myalgias and neck pain.   Skin: Negative for itching and rash.   Neurological: Negative.  Negative for  dizziness, tingling, sensory change, focal weakness, seizures, loss of consciousness, weakness and headaches.   Endo/Heme/Allergies: Negative.    Psychiatric/Behavioral: Negative for depression, hallucinations, substance abuse and suicidal ideas. The patient is not nervous/anxious and does not have insomnia.          Physical Exam   Constitutional: She is oriented to person, place, and time and well-developed, well-nourished, and in no distress. No distress.   HENT:   Head: Normocephalic and atraumatic.   Right Ear: External ear normal.   Left Ear: External ear normal.   Nose: Nose normal.   Eyes: Conjunctivae and EOM are normal. Pupils are equal, round, and reactive to light. Right eye exhibits no discharge. Left eye exhibits no discharge. No scleral icterus.   Neck: Normal range of motion. Neck supple. No JVD present. No tracheal deviation present. No thyromegaly present.   Cardiovascular: Normal rate, regular rhythm, normal heart sounds and intact distal pulses.  Exam reveals no gallop and no friction rub.    No murmur heard.  Pulmonary/Chest: Effort normal and breath sounds normal. No stridor. No respiratory distress. She has no wheezes. She has no rales. She exhibits no tenderness.   Abdominal: Soft. Bowel sounds are normal. She exhibits no distension and no mass. There is no tenderness. There is no rebound and no guarding.   Musculoskeletal: She exhibits no edema.        Left shoulder: She exhibits decreased range of motion, tenderness and pain. She exhibits normal strength.   Lymphadenopathy:     She has no cervical adenopathy.   Neurological: She is alert and oriented to person, place, and time. She has normal reflexes. No cranial nerve deficit. She exhibits normal muscle tone. Gait normal.   Skin: Skin is warm and dry. No rash noted. She is not diaphoretic. No erythema. No pallor.   Psychiatric: Mood, memory, affect and judgment normal.         (M75.42) Impingement syndrome of shoulder region, left   (primary encounter diagnosis)  Comment:   Plan: DRAIN/INJECT LARGE JOINT/BURSA, KENALOG PER 10         MG             Procedure: After prep with rubbing alcohol a combination of 7 cc lidocaine and 80 mg triamcinolone were injected in the left shoulder using a posterior subacromial approach.  This offered good early relief of symptoms and she will follow-up on a as needed basis

## 2018-09-04 ENCOUNTER — OFFICE VISIT (OUTPATIENT)
Dept: GASTROENTEROLOGY | Facility: CLINIC | Age: 73
End: 2018-09-04
Attending: INTERNAL MEDICINE
Payer: MEDICARE

## 2018-09-04 VITALS
HEART RATE: 62 BPM | HEIGHT: 63 IN | SYSTOLIC BLOOD PRESSURE: 126 MMHG | OXYGEN SATURATION: 97 % | DIASTOLIC BLOOD PRESSURE: 74 MMHG | TEMPERATURE: 98.4 F

## 2018-09-04 DIAGNOSIS — K74.3 PRIMARY BILIARY CHOLANGITIS (H): Primary | ICD-10-CM

## 2018-09-04 DIAGNOSIS — K76.0 FATTY LIVER: ICD-10-CM

## 2018-09-04 LAB
ALBUMIN SERPL-MCNC: 3.2 G/DL (ref 3.4–5)
ALP SERPL-CCNC: 140 U/L (ref 40–150)
ALT SERPL W P-5'-P-CCNC: 22 U/L (ref 0–50)
ANION GAP SERPL CALCULATED.3IONS-SCNC: 8 MMOL/L (ref 3–14)
AST SERPL W P-5'-P-CCNC: 19 U/L (ref 0–45)
BILIRUB DIRECT SERPL-MCNC: 0.1 MG/DL (ref 0–0.2)
BILIRUB SERPL-MCNC: 0.4 MG/DL (ref 0.2–1.3)
BUN SERPL-MCNC: 21 MG/DL (ref 7–30)
CALCIUM SERPL-MCNC: 8.6 MG/DL (ref 8.5–10.1)
CHLORIDE SERPL-SCNC: 105 MMOL/L (ref 94–109)
CO2 SERPL-SCNC: 28 MMOL/L (ref 20–32)
CREAT SERPL-MCNC: 0.74 MG/DL (ref 0.52–1.04)
ERYTHROCYTE [DISTWIDTH] IN BLOOD BY AUTOMATED COUNT: 14.6 % (ref 10–15)
GFR SERPL CREATININE-BSD FRML MDRD: 77 ML/MIN/1.7M2
GLUCOSE SERPL-MCNC: 75 MG/DL (ref 70–99)
HCT VFR BLD AUTO: 40.5 % (ref 35–47)
HGB BLD-MCNC: 13 G/DL (ref 11.7–15.7)
INR PPP: 0.98 (ref 0.86–1.14)
MCH RBC QN AUTO: 28.6 PG (ref 26.5–33)
MCHC RBC AUTO-ENTMCNC: 32.1 G/DL (ref 31.5–36.5)
MCV RBC AUTO: 89 FL (ref 78–100)
PLATELET # BLD AUTO: 212 10E9/L (ref 150–450)
POTASSIUM SERPL-SCNC: 4 MMOL/L (ref 3.4–5.3)
PROT SERPL-MCNC: 7.2 G/DL (ref 6.8–8.8)
RBC # BLD AUTO: 4.54 10E12/L (ref 3.8–5.2)
SODIUM SERPL-SCNC: 141 MMOL/L (ref 133–144)
WBC # BLD AUTO: 6.6 10E9/L (ref 4–11)

## 2018-09-04 PROCEDURE — 36415 COLL VENOUS BLD VENIPUNCTURE: CPT | Performed by: INTERNAL MEDICINE

## 2018-09-04 PROCEDURE — 85027 COMPLETE CBC AUTOMATED: CPT | Performed by: INTERNAL MEDICINE

## 2018-09-04 PROCEDURE — 80076 HEPATIC FUNCTION PANEL: CPT | Performed by: INTERNAL MEDICINE

## 2018-09-04 PROCEDURE — 80048 BASIC METABOLIC PNL TOTAL CA: CPT | Performed by: INTERNAL MEDICINE

## 2018-09-04 PROCEDURE — 85610 PROTHROMBIN TIME: CPT | Performed by: INTERNAL MEDICINE

## 2018-09-04 PROCEDURE — G0463 HOSPITAL OUTPT CLINIC VISIT: HCPCS | Mod: ZF

## 2018-09-04 RX ORDER — URSODIOL 300 MG/1
600 CAPSULE ORAL 2 TIMES DAILY
COMMUNITY
End: 2018-09-04

## 2018-09-04 RX ORDER — URSODIOL 300 MG/1
600 CAPSULE ORAL 3 TIMES DAILY
Start: 2018-09-04 | End: 2019-09-03

## 2018-09-04 ASSESSMENT — PAIN SCALES - GENERAL: PAINLEVEL: NO PAIN (0)

## 2018-09-04 NOTE — PROGRESS NOTES
[facility]    Hepatology follow-up    Follow-up visit for primary biliary cholangitis.    Subjective:  73 year old female    PBC  - dx Dec 2017  - AMA(+), hx itch  - fam hx PBC  - hx hypothyroidism, dyslipidemia  - Fibrosis Scan Feb 2018- F2 fibrosis  - UDCA since Dec 2017    Patient comes to clinic this morning with her  for follow-up of PBC.  Last clinic visit Feb 2018.  Patient denies new medications, ER visits or hospital admissions since last clinic visit.    Patient is doing well.  Patient was able to get ursodiol capsule from [place] for a significantly cheaper price.    She notes that the mild intermittent itching she previously had has resolved.  She has not needed to use topical Benadryl.    Patient denies rash, jaundice, lower extremity edema, abdominal distension, lethargy or confusion.    Patient denies melena, hematemesis or hematochezia.    Patient denies fevers, sweats or chills.      Weight has been stable.    Patient rarely drinks alcohol.  Last alcoholic drink was 1 drink at 4th July.  Patient was in [place] visiting her daughter earlier this summer.      Medical hx Surgical hx   Past Medical History:   Diagnosis Date     Dyslipidemia      Eczema      Need for prophylactic hormone replacement therapy (postmenopausal)      Obesity      Unspecified essential hypertension      Unspecified hypothyroidism       Past Surgical History:   Procedure Laterality Date     APPENDECTOMY       BREAST BIOPSY, RT/LT Right 1970     COLPORRHAPHY ANTERIOR  12/6/2010    COLPORRHAPHY ANTERIOR performed by MAY RIVERA at [facility]     CYSTOCELE REPAIR  2007     HYSTERECTOMY, JOSEE  1978    Hysterectomy     INJECT EPIDURAL LUMBAR  10/19/2011    Procedure:INJECT EPIDURAL LUMBAR; DESHAWN-; Surgeon:GENERIC ANESTHESIA PROVIDER; Location:[facility]     INJECT JOINT SACROILIAC  2/7/2011    INJECT JOINT SACROILIAC performed by GENERIC ANESTHESIA PROVIDER at [facility] OR     INJECT JOINT SACROILIAC   4/25/2012    Procedure:INJECT JOINT SACROILIAC; DESHAWN SI Joint --; Surgeon:GENERIC ANESTHESIA PROVIDER; Location:[facility]     PHACOEMULSIFICATION WITH STANDARD INTRAOCULAR LENS IMPLANT  5/26/2011    Procedure:PHACOEMULSIFICATION WITH STANDARD INTRAOCULAR LENS IMPLANT; Surgeon:JOSÉ MANUEL DORSEY; Location:[facility]     PHACOEMULSIFICATION WITH STANDARD INTRAOCULAR LENS IMPLANT  6/9/2011    Procedure:PHACOEMULSIFICATION WITH STANDARD INTRAOCULAR LENS IMPLANT; Surgeon:JOSÉ MANUEL DORSEY; Location:[facility]     RECTOCELE REPAIR  2007     SURGICAL HISTORY OF -       bilateral foot surgery; subtalar joint fusion; bunion     SURGICAL HISTORY OF -   2009    Posterior colporrhaphy w/ posterior Pro-Lift     TONSILLECTOMY            Medications  Prior to Admission medications    Medication Sig Start Date End Date Taking? Authorizing Provider   ASPIRIN 81 MG OR TABS 1 TABLET BY MOUTH DAILY 1/8/07  Yes Sara Jordan MD   Calcium Carbonate-Vit D-Min (CALCIUM 1200 PO) Take 1 tablet by mouth daily   Yes Reported, Patient   Cholecalciferol (VITAMIN D) 2000 UNITS CAPS Take  by mouth daily.   Yes Reported, Patient   Diphenhydramine-APAP, sleep, (TYLENOL PM EXTRA STRENGTH PO) Take by mouth At Bedtime   Yes Reported, Patient   levothyroxine (SYNTHROID/LEVOTHROID) 125 MCG tablet TAKE ONE TABLET BY MOUTH ONE TIME DAILY 2/15/18  Yes Aliyah Aggarwal MD   losartan (COZAAR) 50 MG tablet Take 1 tablet (50 mg) by mouth daily 10/2/17  Yes Aliyah Aggarwal MD   Omega-3 Fatty Acids (OMEGA 3 PO) Take by mouth daily   Yes Reported, Patient   propylene glycol (SYSTANE BALANCE) 0.6 % SOLN ophthalmic solution 1 drop   Yes Reported, Patient   ranitidine (ZANTAC) 150 MG tablet Take 1 tablet (150 mg) by mouth 2 times daily 10/2/17  Yes Aliyah Aggarwal MD   triamcinolone acetonide (KENALOG-40) 40 MG/ML injection 1 mL (40 mg) by INTRA-ARTICULAR route once for 1 dose 7/27/18  Yes Luan Rader PA-C   ursodiol 500 MG TABS Take 1  "tablet by mouth 3 times daily 12/27/17  Yes Dereck Way MD       Allergies  Allergies   Allergen Reactions     Ace Inhibitors Cough     Amlodipine Besylate      Swellings       Dilaudid [Hydromorphone Hcl] Hives     Percocet [Oxycodone-Acetaminophen] Other (See Comments)     Severe dizziness         Review of systems  A 10-point review of systems was negative    Examination  /74  Pulse 62  Temp 98.4  F (36.9  C) (Oral)  Ht 1.594 m (5' 2.75\")  SpO2 97%  There is no height or weight on file to calculate BMI.    Gen- well, NAD, A+Ox3, normal color  CVS- RRR  RS- CTA  Abd- obese, soft, non-tender, no ascites or organomegaly on palpation or percussion, BS+  Extr- hands normal, no YUMI  Skin- no rash or jaundice  Neuro- no asterixis  Psych- normal mood    Laboratory  Lab Results   Component Value Date     09/04/2018    POTASSIUM 4.0 09/04/2018    CHLORIDE 105 09/04/2018    CO2 28 09/04/2018    BUN 21 09/04/2018    CR 0.74 09/04/2018       Lab Results   Component Value Date    BILITOTAL 0.4 09/04/2018    ALT 22 09/04/2018    AST 19 09/04/2018    ALKPHOS 140 09/04/2018       Lab Results   Component Value Date    ALBUMIN 3.2 09/04/2018    PROTTOTAL 7.2 09/04/2018        Lab Results   Component Value Date    WBC 6.6 09/04/2018    HGB 13.0 09/04/2018    MCV 89 09/04/2018     09/04/2018       Lab Results   Component Value Date    INR 0.98 09/04/2018       Radiology  Fibrosis Scan Feb 2018 reviewed    Assessment  73 year old female who presents for routine follow-up of primary biliary cholangitis with F2 fibrosis on Fibrosis Scan.  Liver function tests normal on current dose of ursodiol which is slightly higher than expected weight-based dose.  Itching has resolved with treatment of PBC.  Will continue to monitor on ursodiol.      Plan  1.  Continue ursodiol 300mg PO TID  2.  Check CMP, INR, CBC in 6 months  3.  Follow-up in 12 months    Dereck Aguilar MD  Hepatology  [facility]

## 2018-09-04 NOTE — PATIENT INSTRUCTIONS
Plan  1.  Continue ursodiol at current dose  2.  Repeat blood work in 6 months- this can be done at your local [place] facility  3.  Follow-up with me in the office in 12 months    Dereck Aguilar MD  Hepatology  [facility]

## 2018-09-04 NOTE — LETTER
9/4/2018      RE: Gail Dewitt  [address] [facility] [address]       [facility]    Hepatology follow-up    Follow-up visit for primary biliary cholangitis.    Subjective:  73 year old female    PBC  - dx Dec 2017  - AMA(+), hx itch  - fam hx PBC  - hx hypothyroidism, dyslipidemia  - Fibrosis Scan Feb 2018- F2 fibrosis  - UDCA since Dec 2017    Patient comes to clinic this morning with her  for follow-up of PBC.  Last clinic visit Feb 2018.  Patient denies new medications, ER visits or hospital admissions since last clinic visit.    Patient is doing well.  Patient was able to get ursodiol capsule from [place] for a significantly cheaper price.    She notes that the mild intermittent itching she previously had has resolved.  She has not needed to use topical Benadryl.    Patient denies rash, jaundice, lower extremity edema, abdominal distension, lethargy or confusion.    Patient denies melena, hematemesis or hematochezia.    Patient denies fevers, sweats or chills.      Weight has been stable.    Patient rarely drinks alcohol.  Last alcoholic drink was 1 drink at 4th July.  Patient was in [place] visiting her daughter earlier this summer.      Medical hx Surgical hx   Past Medical History:   Diagnosis Date     Dyslipidemia      Eczema      Need for prophylactic hormone replacement therapy (postmenopausal)      Obesity      Unspecified essential hypertension      Unspecified hypothyroidism       Past Surgical History:   Procedure Laterality Date     APPENDECTOMY       BREAST BIOPSY, RT/LT Right 1970     COLPORRHAPHY ANTERIOR  12/6/2010    COLPORRHAPHY ANTERIOR performed by MAY RIVERA at [facility]     CYSTOCELE REPAIR  2007     HYSTERECTOMY, JOSEE  1978    Hysterectomy     INJECT EPIDURAL LUMBAR  10/19/2011    Procedure:INJECT EPIDURAL LUMBAR; DESHAWN-; Surgeon:GENERIC ANESTHESIA PROVIDER; Location:[facility]     INJECT JOINT SACROILIAC  2/7/2011    INJECT JOINT  SACROILIAC performed by GENERIC ANESTHESIA PROVIDER at [facility]     INJECT JOINT SACROILIAC  4/25/2012    Procedure:INJECT JOINT SACROILIAC; DESHAWN SI Joint --; Surgeon:GENERIC ANESTHESIA PROVIDER; Location:[facility]     PHACOEMULSIFICATION WITH STANDARD INTRAOCULAR LENS IMPLANT  5/26/2011    Procedure:PHACOEMULSIFICATION WITH STANDARD INTRAOCULAR LENS IMPLANT; Surgeon:JOSÉ MANUEL DORSEY; Location:[facility]     PHACOEMULSIFICATION WITH STANDARD INTRAOCULAR LENS IMPLANT  6/9/2011    Procedure:PHACOEMULSIFICATION WITH STANDARD INTRAOCULAR LENS IMPLANT; Surgeon:JOSÉ MANUEL DORSEY; Location:[facility]     RECTOCELE REPAIR  2007     SURGICAL HISTORY OF -       bilateral foot surgery; subtalar joint fusion; bunion     SURGICAL HISTORY OF -   2009    Posterior colporrhaphy w/ posterior Pro-Lift     TONSILLECTOMY            Medications  Prior to Admission medications    Medication Sig Start Date End Date Taking? Authorizing Provider   ASPIRIN 81 MG OR TABS 1 TABLET BY MOUTH DAILY 1/8/07  Yes Sara Jordan MD   Calcium Carbonate-Vit D-Min (CALCIUM 1200 PO) Take 1 tablet by mouth daily   Yes Reported, Patient   Cholecalciferol (VITAMIN D) 2000 UNITS CAPS Take  by mouth daily.   Yes Reported, Patient   Diphenhydramine-APAP, sleep, (TYLENOL PM EXTRA STRENGTH PO) Take by mouth At Bedtime   Yes Reported, Patient   levothyroxine (SYNTHROID/LEVOTHROID) 125 MCG tablet TAKE ONE TABLET BY MOUTH ONE TIME DAILY 2/15/18  Yes Aliyah Aggarwal MD   losartan (COZAAR) 50 MG tablet Take 1 tablet (50 mg) by mouth daily 10/2/17  Yes Aliyah Aggarwal MD   Omega-3 Fatty Acids (OMEGA 3 PO) Take by mouth daily   Yes Reported, Patient   propylene glycol (SYSTANE BALANCE) 0.6 % SOLN ophthalmic solution 1 drop   Yes Reported, Patient   ranitidine (ZANTAC) 150 MG tablet Take 1 tablet (150 mg) by mouth 2 times daily 10/2/17  Yes Aliyah Aggarwal MD   triamcinolone acetonide (KENALOG-40) 40 MG/ML injection 1 mL (40 mg) by INTRA-ARTICULAR  "route once for 1 dose 7/27/18  Yes Luan Rader, PAErumC   ursodiol 500 MG TABS Take 1 tablet by mouth 3 times daily 12/27/17  Yes Dereck Way MD       Allergies  Allergies   Allergen Reactions     Ace Inhibitors Cough     Amlodipine Besylate      Swellings       Dilaudid [Hydromorphone Hcl] Hives     Percocet [Oxycodone-Acetaminophen] Other (See Comments)     Severe dizziness         Review of systems  A 10-point review of systems was negative    Examination  /74  Pulse 62  Temp 98.4  F (36.9  C) (Oral)  Ht 1.594 m (5' 2.75\")  SpO2 97%  There is no height or weight on file to calculate BMI.    Gen- well, NAD, A+Ox3, normal color  CVS- RRR  RS- CTA  Abd- obese, soft, non-tender, no ascites or organomegaly on palpation or percussion, BS+  Extr- hands normal, no YUMI  Skin- no rash or jaundice  Neuro- no asterixis  Psych- normal mood    Laboratory  Lab Results   Component Value Date     09/04/2018    POTASSIUM 4.0 09/04/2018    CHLORIDE 105 09/04/2018    CO2 28 09/04/2018    BUN 21 09/04/2018    CR 0.74 09/04/2018       Lab Results   Component Value Date    BILITOTAL 0.4 09/04/2018    ALT 22 09/04/2018    AST 19 09/04/2018    ALKPHOS 140 09/04/2018       Lab Results   Component Value Date    ALBUMIN 3.2 09/04/2018    PROTTOTAL 7.2 09/04/2018        Lab Results   Component Value Date    WBC 6.6 09/04/2018    HGB 13.0 09/04/2018    MCV 89 09/04/2018     09/04/2018       Lab Results   Component Value Date    INR 0.98 09/04/2018       Radiology  Fibrosis Scan Feb 2018 reviewed    Assessment  73 year old female who presents for routine follow-up of primary biliary cholangitis with F2 fibrosis on Fibrosis Scan.  Liver function tests normal on current dose of ursodiol which is slightly higher than expected weight-based dose.  Itching has resolved with treatment of PBC.  Will continue to monitor on ursodiol.      Plan  1.  Continue ursodiol 300mg PO TID  2.  Check CMP, INR, CBC in 6 months  3. "  Follow-up in 12 months    Dereck Aguilar MD  Hepatology  [facility]

## 2018-09-04 NOTE — MR AVS SNAPSHOT
After Visit Summary   9/4/2018    Gail Dewitt    MRN: [id number]           Patient Information     Date Of Birth          1945        Visit Information        Provider Department      9/4/2018 10:30 AM Dereck Wya MD [facility]        Today's Diagnoses     Primary biliary cholangitis (H)    -  1      Care Instructions    Plan  1.  Continue ursodiol at current dose  2.  Repeat blood work in 6 months- this can be done at your local [place] facility  3.  Follow-up with me in the office in 12 months    Dereck Aguilar MD  Hepatology  [facility]          Follow-ups after your visit        Follow-up notes from your care team     Return in about 1 year (around 9/4/2019).      Your next 10 appointments already scheduled     Sep 03, 2019  9:30 AM CDT   Lab with  LAB   [facility] ([facility])    [address] [place] [address]  1st Floor  [facility] [id number] [phone]            Sep 03, 2019 10:30 AM CDT   (Arrive by 10:15 AM)   Return General Liver with Dereck Key MD   [facility] ([facility])    [address] [place] [address]  [address]  [facility] [address] [phone]              Future tests that were ordered for you today     Open Future Orders        Priority Expected Expires Ordered    INR Routine  10/4/2018 9/4/2018    Hepatic panel Routine  10/4/2018 9/4/2018    CBC with platelets Routine  10/4/2018 9/4/2018    Basic metabolic panel Routine  10/4/2018 9/4/2018            Who to contact     If you have questions or need follow up information about today's clinic visit or your schedule please contact [facility] directly at [phone].  Normal or non-critical lab and imaging results will be communicated to you by MyChart, letter or phone within 4 business days after the clinic has received the results. If you do not hear from us within 7 days, please contact the clinic through  "MyChart or phone. If you have a critical or abnormal lab result, we will notify you by phone as soon as possible.  Submit refill requests through [employer] or call your pharmacy and they will forward the refill request to us. Please allow 3 business days for your refill to be completed.          Additional Information About Your Visit        [employer] Information     [employer] gives you secure access to your electronic health record. If you see a primary care provider, you can also send messages to your care team and make appointments. If you have questions, please call your primary care clinic.  If you do not have a primary care provider, please call [phone] and they will assist you.        Care EveryWhere ID     This is your Care EveryWhere ID. This could be used by other organizations to access your [place] medical records  [id number]        Your Vitals Were     Pulse Temperature Height Pulse Oximetry          62 98.4  F (36.9  C) (Oral) 1.594 m (5' 2.75\") 97%         Blood Pressure from Last 3 Encounters:   09/04/18 126/74   07/27/18 134/60   04/11/18 137/70    Weight from Last 3 Encounters:   04/11/18 98.8 kg (217 lb 12.8 oz)   02/05/18 97.3 kg (214 lb 9.6 oz)   12/19/17 97.5 kg (215 lb)                 Today's Medication Changes          These changes are accurate as of 9/4/18 11:18 AM.  If you have any questions, ask your nurse or doctor.               These medicines have changed or have updated prescriptions.        Dose/Directions    * ursodiol 500 MG Tabs   This may have changed:  Another medication with the same name was changed. Make sure you understand how and when to take each.   Used for:  Primary biliary cholangitis (H)   Changed by:  Dereck Way MD        Dose:  1 tablet   Take 1 tablet by mouth 3 times daily   Quantity:  180 tablet   Refills:  4       * ursodiol 300 MG capsule   Commonly known as:  ACTIGALL   This may have changed:  when to take this   Used for:  Primary biliary " cholangitis (H)   Changed by:  Dereck Way MD        Dose:  600 mg   Take 2 capsules (600 mg) by mouth 3 times daily   Refills:  0       * Notice:  This list has 2 medication(s) that are the same as other medications prescribed for you. Read the directions carefully, and ask your doctor or other care provider to review them with you.         Where to get your medicines      Some of these will need a paper prescription and others can be bought over the counter.  Ask your nurse if you have questions.     You don't need a prescription for these medications     ursodiol 300 MG capsule                Primary Care Provider Office Phone # Fax #    Aliyah Aggarwal [phone] [address] [place], [place] [id number]        Equal Access to Services     BRANDI SARAVIA : Silvia Barrera, waaxda haylie, qaybta kaalmada corona, maria del carmen bermudez . So [facility] [phone].    ATENCIÓN: Si habla español, tiene a cates disposición servicios gratuitos de asistencia lingüística. [facility] [phone].    We comply with applicable federal civil rights laws and [place] laws. We do not discriminate on the basis of race, color, national origin, age, disability, sex, sexual orientation, or gender identity.            Thank you!     Thank you for choosing [facility]PATOLOG[facility]  for your care. Our goal is always to provide you with excellent care. Hearing back from our patients is one way we can continue to improve our services. Please take a few minutes to complete the written survey that you may receive in the mail after your visit with us. Thank you!             Your Updated Medication List - Protect others around you: Learn how to safely use, store and throw away your medicines at www.disposemymeds.org.          This list is accurate as of 9/4/18 11:18 AM.  Always use your most recent med list.                   Brand Name Dispense Instructions for use Diagnosis     aspirin 81 MG tablet     100    1 TABLET BY MOUTH DAILY    Essential hypertension, benign       CALCIUM 1200 PO      Take 1 tablet by mouth daily        levothyroxine 125 MCG tablet    SYNTHROID/LEVOTHROID    90 tablet    TAKE ONE TABLET BY MOUTH ONE TIME DAILY    Hypothyroidism, unspecified type       losartan 50 MG tablet    COZAAR    90 tablet    Take 1 tablet (50 mg) by mouth daily    Benign essential hypertension       OMEGA 3 PO      Take by mouth daily        propylene glycol 0.6 % Soln ophthalmic solution    SYSTANE BALANCE     1 drop        ranitidine 150 MG tablet    ZANTAC    60 tablet    Take 1 tablet (150 mg) by mouth 2 times daily    Gastroesophageal reflux disease without esophagitis       triamcinolone acetonide 40 MG/ML injection    KENALOG-40    2 mL    1 mL (40 mg) by INTRA-ARTICULAR route once for 1 dose        TYLENOL PM EXTRA STRENGTH PO      Take by mouth At Bedtime    Pain of left lower extremity       * ursodiol 500 MG Tabs     180 tablet    Take 1 tablet by mouth 3 times daily    Primary biliary cholangitis (H)       * ursodiol 300 MG capsule    ACTIGALL     Take 2 capsules (600 mg) by mouth 3 times daily    Primary biliary cholangitis (H)       vitamin D 2000 units Caps      Take  by mouth daily.        * Notice:  This list has 2 medication(s) that are the same as other medications prescribed for you. Read the directions carefully, and ask your doctor or other care provider to review them with you.

## 2018-10-17 ENCOUNTER — ALLIED HEALTH/NURSE VISIT (OUTPATIENT)
Dept: FAMILY MEDICINE | Facility: CLINIC | Age: 73
End: 2018-10-17
Payer: COMMERCIAL

## 2018-10-17 ENCOUNTER — RADIANT APPOINTMENT (OUTPATIENT)
Dept: MAMMOGRAPHY | Facility: CLINIC | Age: 73
End: 2018-10-17
Payer: COMMERCIAL

## 2018-10-17 DIAGNOSIS — Z12.31 VISIT FOR SCREENING MAMMOGRAM: ICD-10-CM

## 2018-10-17 DIAGNOSIS — Z23 NEED FOR PROPHYLACTIC VACCINATION AND INOCULATION AGAINST INFLUENZA: Primary | ICD-10-CM

## 2018-10-17 PROCEDURE — 77067 SCR MAMMO BI INCL CAD: CPT | Mod: TC

## 2018-10-17 PROCEDURE — G0008 ADMIN INFLUENZA VIRUS VAC: HCPCS

## 2018-10-17 PROCEDURE — 90662 IIV NO PRSV INCREASED AG IM: CPT

## 2018-10-17 NOTE — MR AVS SNAPSHOT
After Visit Summary   10/17/2018    Gail Dewitt    MRN: [id number]           Patient Information     Date Of Birth          1945        Visit Information        Provider Department      10/17/2018 9:15 AM FL NB CMA/LPN [facility]        Today's Diagnoses     Need for prophylactic vaccination and inoculation against influenza    -  1       Follow-ups after your visit        Your next 10 appointments already scheduled     Oct 17, 2018  9:30 AM CDT   MA SCREENING DIGITAL BILATERAL with NBMA1   [facility] ([facility])    [address] [place] [id number]   [phone]           How do I prepare for my exam? (Food and drink instructions) No Food and Drink Restrictions.  How do I prepare for my exam? (Other instructions) Do not use any powder, lotion or deodorant under your arms or on your breast. If you do, we will ask you to remove it before your exam.  What should I wear: Wear comfortable, two-piece clothing.  How long does the exam take: Most scans will take 15 minutes.  What should I bring: Bring any previous mammograms from other facilities or have them mailed to the breast center.  Do I need a :  No  is needed.  What do I need to tell my doctor: If you have any allergies, tell your care team.  What should I do after the exam: No restrictions, You may resume normal activities.  What is this test: This test is an x-ray of the breast to look for breast disease. The breast is pressed between two plates to flatten and spread the tissue. An X-ray is taken of the breast from different angles.  Who should I call with questions: If you have any questions, please call the Imaging Department where you will have your exam. Directions, parking instructions, and other information is available on our website, [email or web address].  Other information about my exam Three-dimensional (3D) mammograms are available at [facility]  "locations in [place], [place], [place], [place], [facility], [place], [facility] and [place]. [facility] locations include [place] and the [facility] in [place].  Benefits of 3D mammograms include * Improved rate of cancer detection * Decreases your chance of having to go back for more tests, which means fewer: * \"False-positive\" results (This means that there is an abnormal area but it isn't cancer.) * Invasive testing procedures, such as a biopsy or surgery * Can provide clearer images of the breast if you have dense breast tissue.  *3D mammography is an optional exam that anyone can have with a 2D mammogram. It doesn't replace or take the place of a 2D mammogram. 2D mammograms remain an effective screening test for all women.  Not all insurance companies cover the cost of a 3D mammogram. Check with your insurance.            Sep 03, 2019  9:30 AM CDT   Lab with  LAB   [facility] ([facility])    [address]  1st Floor  [facility] [address] [phone]            Sep 03, 2019 10:30 AM CDT   (Arrive by 10:15 AM)   Return General Liver with Dereck Key MD   [facility] Hepatology ([facility])    [address]  [address]  [facility] [id number] [phone]              Who to contact     If you have questions or need follow up information about today's clinic visit or your schedule please contact [facility] directly at [phone].  Normal or non-critical lab and imaging results will be communicated to you by MyChart, letter or phone within 4 business days after the clinic has received the results. If you do not hear from us within 7 days, please contact the clinic through MyChart or phone. If you have a critical or abnormal lab result, we will notify you by phone as soon as possible.  Submit refill requests through [employer] or call your pharmacy and they will forward the " refill request to us. Please allow 3 business days for your refill to be completed.          Additional Information About Your Visit        [facility] Information     [facility] gives you secure access to your electronic health record. If you see a primary care provider, you can also send messages to your care team and make appointments. If you have questions, please call your primary care clinic.  If you do not have a primary care provider, please call [phone] and they will assist you.        Care EveryWhere ID     This is your Care EveryWhere ID. This could be used by other organizations to access your [facility] medical records  [id number]         Blood Pressure from Last 3 Encounters:   09/04/18 126/74   07/27/18 134/60   04/11/18 137/70    Weight from Last 3 Encounters:   04/11/18 217 lb 12.8 oz (98.8 kg)   02/05/18 214 lb 9.6 oz (97.3 kg)   12/19/17 215 lb (97.5 kg)              We Performed the Following     FLU VACCINE, INCREASED ANTIGEN, PRESV FREE, AGE 65+ [66000]     Vaccine Administration, Initial [01138]        Primary Care Provider Office Phone # Fax #    Aliyah Aggarwal [phone] [address] [place], [place] [address]        Equal Access to Services     BRANDI SARAVIA : Hadii aad ku hadasho Soomaali, waaxda luqadaha, qaybta kaalmada adeegyada, waxay douglasin hayaldo luz. So [facility] [phone].    ATENCIÓN: Si habla español, tiene a cates disposición servicios gratuitos de asistencia lingüística. Llame al [phone].    We comply with applicable federal civil rights laws and [place] laws. We do not discriminate on the basis of race, color, national origin, age, disability, sex, sexual orientation, or gender identity.            Thank you!     Thank you for choosing [facility]  for your care. Our goal is always to provide you with excellent care. Hearing back from our patients is one way we can continue to improve our services. Please take a  few minutes to complete the written survey that you may receive in the mail after your visit with us. Thank you!             Your Updated Medication List - Protect others around you: Learn how to safely use, store and throw away your medicines at www.disposemymeds.org.          This list is accurate as of 10/17/18  9:29 AM.  Always use your most recent med list.                   Brand Name Dispense Instructions for use Diagnosis    aspirin 81 MG tablet     100    1 TABLET BY MOUTH DAILY    Essential hypertension, benign       CALCIUM 1200 PO      Take 1 tablet by mouth daily        levothyroxine 125 MCG tablet    SYNTHROID/LEVOTHROID    90 tablet    TAKE ONE TABLET BY MOUTH ONE TIME DAILY    Hypothyroidism, unspecified type       losartan 50 MG tablet    COZAAR    90 tablet    Take 1 tablet (50 mg) by mouth daily    Benign essential hypertension       OMEGA 3 PO      Take by mouth daily        propylene glycol 0.6 % Soln ophthalmic solution    SYSTANE BALANCE     1 drop        ranitidine 150 MG tablet    ZANTAC    60 tablet    Take 1 tablet (150 mg) by mouth 2 times daily    Gastroesophageal reflux disease without esophagitis       triamcinolone acetonide 40 MG/ML injection    KENALOG-40    2 mL    1 mL (40 mg) by INTRA-ARTICULAR route once for 1 dose        TYLENOL PM EXTRA STRENGTH PO      Take by mouth At Bedtime    Pain of left lower extremity       * ursodiol 500 MG Tabs     180 tablet    Take 1 tablet by mouth 3 times daily    Primary biliary cholangitis (H)       * ursodiol 300 MG capsule    ACTIGALL     Take 2 capsules (600 mg) by mouth 3 times daily    Primary biliary cholangitis (H)       vitamin D 2000 units Caps      Take  by mouth daily.        * Notice:  This list has 2 medication(s) that are the same as other medications prescribed for you. Read the directions carefully, and ask your doctor or other care provider to review them with you.

## 2018-10-17 NOTE — PROGRESS NOTES

## 2018-12-31 DIAGNOSIS — K74.3 PRIMARY BILIARY CHOLANGITIS (H): ICD-10-CM

## 2018-12-31 RX ORDER — URSODIOL 500 MG/1
TABLET, FILM COATED ORAL
Qty: 180 TABLET | Refills: 3 | Status: SHIPPED | OUTPATIENT
Start: 2018-12-31 | End: 2019-02-12

## 2019-01-17 ENCOUNTER — EXTERNAL ORDER RESULTS (OUTPATIENT)
Dept: GASTROENTEROLOGY | Facility: CLINIC | Age: 74
End: 2019-01-17

## 2019-01-17 LAB
ALBUMIN SERPL-MCNC: 3.9 G/DL (ref 3.2–4.6)
ALP SERPL-CCNC: 109 U/L (ref 40–150)
ALT SERPL-CCNC: 17 U/L (ref 0–50)
AST SERPL-CCNC: 23 U/L (ref 5–34)
BILIRUB SERPL-MCNC: 0.5 MG/DL (ref 0.2–1.2)
PROT SERPL-MCNC: 7.1 G/DL (ref 6–8)

## 2019-02-12 DIAGNOSIS — K74.3 PRIMARY BILIARY CHOLANGITIS (H): ICD-10-CM

## 2019-02-12 RX ORDER — URSODIOL 500 MG/1
500 TABLET, FILM COATED ORAL 3 TIMES DAILY
Qty: 180 TABLET | Refills: 3 | Status: SHIPPED | OUTPATIENT
Start: 2019-02-12 | End: 2019-09-03

## 2019-08-06 DIAGNOSIS — K74.3 PRIMARY BILIARY CHOLANGITIS (H): Primary | ICD-10-CM

## 2019-08-08 ENCOUNTER — DOCUMENTATION ONLY (OUTPATIENT)
Dept: CARE COORDINATION | Facility: CLINIC | Age: 74
End: 2019-08-08

## 2019-08-29 ENCOUNTER — TELEPHONE (OUTPATIENT)
Dept: GASTROENTEROLOGY | Facility: CLINIC | Age: 74
End: 2019-08-29

## 2019-08-29 NOTE — TELEPHONE ENCOUNTER
Patient contacted and reminded of upcoming appointment.  Patient confirmed they will be attending.  Patient instructed to bring updated medications list to appointment.    Felisa Butterfield CMA

## 2019-09-03 ENCOUNTER — OFFICE VISIT (OUTPATIENT)
Dept: GASTROENTEROLOGY | Facility: CLINIC | Age: 74
End: 2019-09-03
Attending: INTERNAL MEDICINE
Payer: MEDICARE

## 2019-09-03 VITALS
HEIGHT: 63 IN | BODY MASS INDEX: 38.06 KG/M2 | HEART RATE: 70 BPM | DIASTOLIC BLOOD PRESSURE: 61 MMHG | SYSTOLIC BLOOD PRESSURE: 146 MMHG | OXYGEN SATURATION: 96 % | WEIGHT: 214.8 LBS | TEMPERATURE: 98.5 F

## 2019-09-03 DIAGNOSIS — K74.3 PRIMARY BILIARY CHOLANGITIS (H): ICD-10-CM

## 2019-09-03 PROBLEM — K76.0 FATTY LIVER: Status: RESOLVED | Noted: 2017-10-31 | Resolved: 2019-09-03

## 2019-09-03 LAB
ALBUMIN SERPL-MCNC: 3.5 G/DL (ref 3.4–5)
ALP SERPL-CCNC: 130 U/L (ref 40–150)
ALT SERPL W P-5'-P-CCNC: 18 U/L (ref 0–50)
ANION GAP SERPL CALCULATED.3IONS-SCNC: 4 MMOL/L (ref 3–14)
AST SERPL W P-5'-P-CCNC: 16 U/L (ref 0–45)
BILIRUB DIRECT SERPL-MCNC: 0.1 MG/DL (ref 0–0.2)
BILIRUB SERPL-MCNC: 0.5 MG/DL (ref 0.2–1.3)
BUN SERPL-MCNC: 18 MG/DL (ref 7–30)
CALCIUM SERPL-MCNC: 8.9 MG/DL (ref 8.5–10.1)
CHLORIDE SERPL-SCNC: 107 MMOL/L (ref 94–109)
CO2 SERPL-SCNC: 28 MMOL/L (ref 20–32)
CREAT SERPL-MCNC: 0.63 MG/DL (ref 0.52–1.04)
ERYTHROCYTE [DISTWIDTH] IN BLOOD BY AUTOMATED COUNT: 12.9 % (ref 10–15)
GFR SERPL CREATININE-BSD FRML MDRD: 87 ML/MIN/{1.73_M2}
GLUCOSE SERPL-MCNC: 77 MG/DL (ref 70–99)
HCT VFR BLD AUTO: 40.5 % (ref 35–47)
HGB BLD-MCNC: 13 G/DL (ref 11.7–15.7)
INR PPP: 1.03 (ref 0.86–1.14)
MCH RBC QN AUTO: 29.1 PG (ref 26.5–33)
MCHC RBC AUTO-ENTMCNC: 32.1 G/DL (ref 31.5–36.5)
MCV RBC AUTO: 91 FL (ref 78–100)
PLATELET # BLD AUTO: 203 10E9/L (ref 150–450)
POTASSIUM SERPL-SCNC: 3.8 MMOL/L (ref 3.4–5.3)
PROT SERPL-MCNC: 7.4 G/DL (ref 6.8–8.8)
RBC # BLD AUTO: 4.46 10E12/L (ref 3.8–5.2)
SODIUM SERPL-SCNC: 139 MMOL/L (ref 133–144)
WBC # BLD AUTO: 5.2 10E9/L (ref 4–11)

## 2019-09-03 PROCEDURE — G0463 HOSPITAL OUTPT CLINIC VISIT: HCPCS | Mod: ZF

## 2019-09-03 PROCEDURE — 85610 PROTHROMBIN TIME: CPT | Performed by: INTERNAL MEDICINE

## 2019-09-03 PROCEDURE — 80076 HEPATIC FUNCTION PANEL: CPT | Performed by: INTERNAL MEDICINE

## 2019-09-03 PROCEDURE — 36415 COLL VENOUS BLD VENIPUNCTURE: CPT | Performed by: INTERNAL MEDICINE

## 2019-09-03 PROCEDURE — 85027 COMPLETE CBC AUTOMATED: CPT | Performed by: INTERNAL MEDICINE

## 2019-09-03 PROCEDURE — 80048 BASIC METABOLIC PNL TOTAL CA: CPT | Performed by: INTERNAL MEDICINE

## 2019-09-03 RX ORDER — URSODIOL 500 MG/1
500 TABLET, FILM COATED ORAL 3 TIMES DAILY
Qty: 270 TABLET | Refills: 3 | Status: SHIPPED | OUTPATIENT
Start: 2019-09-03 | End: 2020-09-08

## 2019-09-03 ASSESSMENT — MIFFLIN-ST. JEOR: SCORE: 1434.58

## 2019-09-03 ASSESSMENT — PAIN SCALES - GENERAL: PAINLEVEL: NO PAIN (0)

## 2019-09-03 NOTE — PROGRESS NOTES
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  Last clinic visit Sep 2018.  She underwent EGD and colonoscopy in March 2019 for diarrhea.  Patient received an intra-articular steroid injection earlier this year for right shoulder bursitis.  She denies new regular medications, ER visits or hospital admissions since last clinic visit.    Patient is doing well.  She denies any symptoms specific to liver disease.      Patient denies itch, jaundice, lower extremity edema, abdominal distension, lethargy or confusion.    Patient denies melena, hematemesis or hematochezia.    Patient denies fevers, sweats or chills.      Weight stable.  Appetite is good.    Patient rarely drinks alcohol.  She was in [place], [place] yesterday for lunch as her  celebrated his birthday.      Medical hx Surgical hx   Past Medical History:   Diagnosis Date     Dyslipidemia      Eczema      Need for prophylactic hormone replacement therapy (postmenopausal)      Obesity      Unspecified essential hypertension      Unspecified hypothyroidism       Past Surgical History:   Procedure Laterality Date     APPENDECTOMY       BREAST BIOPSY, RT/LT Right 1970     COLPORRHAPHY ANTERIOR  12/6/2010    COLPORRHAPHY ANTERIOR performed by MAY RIVERA at [facility]     CYSTOCELE REPAIR  2007     HYSTERECTOMY, JOSEE  1978    Hysterectomy     INJECT EPIDURAL LUMBAR  10/19/2011    Procedure:INJECT EPIDURAL LUMBAR; DESHAWN-; Surgeon:GENERIC ANESTHESIA PROVIDER; Location:[facility]     INJECT JOINT SACROILIAC  2/7/2011    INJECT JOINT SACROILIAC performed by GENERIC ANESTHESIA PROVIDER at [facility]     INJECT JOINT SACROILIAC  4/25/2012    Procedure:INJECT  JOINT SACROILIAC; DESHAWN SI Joint --; Surgeon:GENERIC ANESTHESIA PROVIDER; Location:[facility]     PHACOEMULSIFICATION WITH STANDARD INTRAOCULAR LENS IMPLANT  5/26/2011    Procedure:PHACOEMULSIFICATION WITH STANDARD INTRAOCULAR LENS IMPLANT; Surgeon:JOSÉ MANUEL DORSEY; Location:[facility]     PHACOEMULSIFICATION WITH STANDARD INTRAOCULAR LENS IMPLANT  6/9/2011    Procedure:PHACOEMULSIFICATION WITH STANDARD INTRAOCULAR LENS IMPLANT; Surgeon:JOSÉ MANUEL DORSEY; Location:[facility]     RECTOCELE REPAIR  2007     SURGICAL HISTORY OF -       bilateral foot surgery; subtalar joint fusion; bunion     SURGICAL HISTORY OF -   2009    Posterior colporrhaphy w/ posterior Pro-Lift     TONSILLECTOMY            Medications  Prior to Admission medications    Medication Sig Start Date End Date Taking? Authorizing Provider   ASPIRIN 81 MG OR TABS 1 TABLET BY MOUTH DAILY 1/8/07  Yes Sara Jordan MD   Calcium Carbonate-Vit D-Min (CALCIUM 1200 PO) Take 1 tablet by mouth daily   Yes Reported, Patient   Cholecalciferol (VITAMIN D) 2000 UNITS CAPS Take  by mouth daily.   Yes Reported, Patient   Diphenhydramine-APAP, sleep, (TYLENOL PM EXTRA STRENGTH PO) Take by mouth At Bedtime   Yes Reported, Patient   levothyroxine (SYNTHROID/LEVOTHROID) 125 MCG tablet TAKE ONE TABLET BY MOUTH ONE TIME DAILY 2/15/18  Yes Aliyah Aggarwal MD   losartan (COZAAR) 50 MG tablet Take 1 tablet (50 mg) by mouth daily 10/2/17  Yes Aliyah Aggarwal MD   propylene glycol (SYSTANE BALANCE) 0.6 % SOLN ophthalmic solution 1 drop   Yes Reported, Patient   ranitidine (ZANTAC) 150 MG tablet Take 1 tablet (150 mg) by mouth 2 times daily 10/2/17  Yes Aliyah Aggarwal MD   ursodiol (ACTIGALL) 500 MG tablet Take 1 tablet (500 mg) by mouth 3 times daily 2/12/19  Yes Dereck Way MD       Allergies  Allergies   Allergen Reactions     Ace Inhibitors Cough     Amlodipine Besylate      Swellings       Dilaudid [Hydromorphone Hcl] Hives     Percocet  "[Oxycodone-Acetaminophen] Other (See Comments)     Severe dizziness         Review of systems  A 10-point review of systems was negative    Examination  BP (!) 146/61 (BP Location: Right arm, Patient Position: Sitting, Cuff Size: Adult Regular)   Pulse 70   Temp 98.5  F (36.9  C) (Oral)   Ht 1.594 m (5' 2.76\")   Wt 97.4 kg (214 lb 12.8 oz)   SpO2 96%   BMI 38.35 kg/m    Body mass index is 38.35 kg/m .    Gen- well, NAD, A+Ox3, normal color  CVS- RRR  RS- CTA  Abd- obese, soft, non-tender, no ascites or organomegaly on palpation or percussion, BS+  Extr- hands normal, no YUMI  Skin- no rash or jaundice  Neuro- no asterixis  Psych- normal mood    Laboratory  Lab Results   Component Value Date     09/03/2019    POTASSIUM 3.8 09/03/2019    CHLORIDE 107 09/03/2019    CO2 28 09/03/2019    BUN 18 09/03/2019    CR 0.63 09/03/2019       Lab Results   Component Value Date    BILITOTAL 0.5 09/03/2019    ALT 18 09/03/2019    AST 16 09/03/2019    ALKPHOS 130 09/03/2019       Lab Results   Component Value Date    ALBUMIN 3.5 09/03/2019    PROTTOTAL 7.4 09/03/2019        Lab Results   Component Value Date    WBC 5.2 09/03/2019    HGB 13.0 09/03/2019    MCV 91 09/03/2019     09/03/2019       Lab Results   Component Value Date    INR 1.03 09/03/2019       Radiology  Nil recent    Assessment  74 year old female with history of hypothyroidism and dyslipidemia who present for routine follow-up of primary biliary cholangitis.  Liver function tests normal on current therapy.  Ursodiol dosed appropriately according to patient weight.    Plan  1.  Continue ursodiol 500mg PO TID  2.  Check LFTs in 6 months  3.  Follow-up in 12 months    Dereck Aguilar MD  Hepatology  [facility]  "

## 2019-09-03 NOTE — NURSING NOTE
Chief Complaint   Patient presents with     RECHECK     Follow up      Alessandra Perry on 9/3/2019 at 10:07 AM

## 2019-09-03 NOTE — LETTER
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  Last clinic visit Sep 2018.  She underwent EGD and colonoscopy in March 2019 for diarrhea.  Patient received an intra-articular steroid injection earlier this year for right shoulder bursitis.  She denies new regular medications, ER visits or hospital admissions since last clinic visit.    Patient is doing well.  She denies any symptoms specific to liver disease.      Patient denies itch, jaundice, lower extremity edema, abdominal distension, lethargy or confusion.    Patient denies melena, hematemesis or hematochezia.    Patient denies fevers, sweats or chills.      Weight stable.  Appetite is good.    Patient rarely drinks alcohol.  She was in [place], [place] yesterday for lunch as her  celebrated his birthday.      Medical hx Surgical hx   Past Medical History:   Diagnosis Date     Dyslipidemia      Eczema      Need for prophylactic hormone replacement therapy (postmenopausal)      Obesity      Unspecified essential hypertension      Unspecified hypothyroidism       Past Surgical History:   Procedure Laterality Date     APPENDECTOMY       BREAST BIOPSY, RT/LT Right 1970     COLPORRHAPHY ANTERIOR  12/6/2010    COLPORRHAPHY ANTERIOR performed by MAY RIVERA at [facility]     CYSTOCELE REPAIR  2007     HYSTERECTOMY, JOSEE  1978    Hysterectomy     INJECT EPIDURAL LUMBAR  10/19/2011    Procedure:INJECT EPIDURAL LUMBAR; DESHAWN-; Surgeon:GENERIC ANESTHESIA PROVIDER; Location:[facility]     INJECT JOINT SACROILIAC  2/7/2011    INJECT JOINT SACROILIAC performed by  GENERIC ANESTHESIA PROVIDER at [facility]     INJECT JOINT SACROILIAC  4/25/2012    Procedure:INJECT JOINT SACROILIAC; DESHAWN SI Joint --; Surgeon:GENERIC ANESTHESIA PROVIDER; Location:[facility]     PHACOEMULSIFICATION WITH STANDARD INTRAOCULAR LENS IMPLANT  5/26/2011    Procedure:PHACOEMULSIFICATION WITH STANDARD INTRAOCULAR LENS IMPLANT; Surgeon:JOSÉ MANUEL DORSEY; Location:[facility]     PHACOEMULSIFICATION WITH STANDARD INTRAOCULAR LENS IMPLANT  6/9/2011    Procedure:PHACOEMULSIFICATION WITH STANDARD INTRAOCULAR LENS IMPLANT; Surgeon:JOSÉ MANUEL DORSEY; Location:[facility]     RECTOCELE REPAIR  2007     SURGICAL HISTORY OF -       bilateral foot surgery; subtalar joint fusion; bunion     SURGICAL HISTORY OF -   2009    Posterior colporrhaphy w/ posterior Pro-Lift     TONSILLECTOMY            Medications  Prior to Admission medications    Medication Sig Start Date End Date Taking? Authorizing Provider   ASPIRIN 81 MG OR TABS 1 TABLET BY MOUTH DAILY 1/8/07  Yes Sara Jordan MD   Calcium Carbonate-Vit D-Min (CALCIUM 1200 PO) Take 1 tablet by mouth daily   Yes Reported, Patient   Cholecalciferol (VITAMIN D) 2000 UNITS CAPS Take  by mouth daily.   Yes Reported, Patient   Diphenhydramine-APAP, sleep, (TYLENOL PM EXTRA STRENGTH PO) Take by mouth At Bedtime   Yes Reported, Patient   levothyroxine (SYNTHROID/LEVOTHROID) 125 MCG tablet TAKE ONE TABLET BY MOUTH ONE TIME DAILY 2/15/18  Yes Aliyah Aggarwal MD   losartan (COZAAR) 50 MG tablet Take 1 tablet (50 mg) by mouth daily 10/2/17  Yes Aliyah Aggarwal MD   propylene glycol (SYSTANE BALANCE) 0.6 % SOLN ophthalmic solution 1 drop   Yes Reported, Patient   ranitidine (ZANTAC) 150 MG tablet Take 1 tablet (150 mg) by mouth 2 times daily 10/2/17  Yes Aliyah Aggarwal MD   ursodiol (ACTIGALL) 500 MG tablet Take 1 tablet (500 mg) by mouth 3 times daily 2/12/19  Yes Dereck Way MD       Allergies  Allergies   Allergen Reactions     Ace Inhibitors  "Cough     Amlodipine Besylate      Swellings       Dilaudid [Hydromorphone Hcl] Hives     Percocet [Oxycodone-Acetaminophen] Other (See Comments)     Severe dizziness         Review of systems  A 10-point review of systems was negative    Examination  BP (!) 146/61 (BP Location: Right arm, Patient Position: Sitting, Cuff Size: Adult Regular)   Pulse 70   Temp 98.5  F (36.9  C) (Oral)   Ht 1.594 m (5' 2.76\")   Wt 97.4 kg (214 lb 12.8 oz)   SpO2 96%   BMI 38.35 kg/m     Body mass index is 38.35 kg/m .    Gen- well, NAD, A+Ox3, normal color  CVS- RRR  RS- CTA  Abd- obese, soft, non-tender, no ascites or organomegaly on palpation or percussion, BS+  Extr- hands normal, no YUMI  Skin- no rash or jaundice  Neuro- no asterixis  Psych- normal mood    Laboratory  Lab Results   Component Value Date     09/03/2019    POTASSIUM 3.8 09/03/2019    CHLORIDE 107 09/03/2019    CO2 28 09/03/2019    BUN 18 09/03/2019    CR 0.63 09/03/2019       Lab Results   Component Value Date    BILITOTAL 0.5 09/03/2019    ALT 18 09/03/2019    AST 16 09/03/2019    ALKPHOS 130 09/03/2019       Lab Results   Component Value Date    ALBUMIN 3.5 09/03/2019    PROTTOTAL 7.4 09/03/2019        Lab Results   Component Value Date    WBC 5.2 09/03/2019    HGB 13.0 09/03/2019    MCV 91 09/03/2019     09/03/2019       Lab Results   Component Value Date    INR 1.03 09/03/2019       Radiology  Nil recent    Assessment  74 year old female with history of hypothyroidism and dyslipidemia who present for routine follow-up of primary biliary cholangitis.  Liver function tests normal on current therapy.  Ursodiol dosed appropriately according to patient weight.    Plan  1.  Continue ursodiol 500mg PO TID  2.  Check LFTs in 6 months  3.  Follow-up in 12 months    Dereck Aguilar MD  Hepatology  [facility]    Dereck Aguilar MD      "

## 2019-11-13 ENCOUNTER — ANCILLARY PROCEDURE (OUTPATIENT)
Dept: MAMMOGRAPHY | Facility: CLINIC | Age: 74
End: 2019-11-13
Payer: MEDICARE

## 2019-11-13 DIAGNOSIS — Z12.31 VISIT FOR SCREENING MAMMOGRAM: ICD-10-CM

## 2019-11-13 PROCEDURE — 77063 BREAST TOMOSYNTHESIS BI: CPT | Mod: TC

## 2019-11-13 PROCEDURE — 77067 SCR MAMMO BI INCL CAD: CPT | Mod: TC

## 2020-05-01 DIAGNOSIS — K74.3 PRIMARY BILIARY CHOLANGITIS (H): Primary | ICD-10-CM

## 2020-05-04 DIAGNOSIS — K74.3 PRIMARY BILIARY CHOLANGITIS (H): Primary | ICD-10-CM

## 2020-08-17 ENCOUNTER — DOCUMENTATION ONLY (OUTPATIENT)
Dept: CARE COORDINATION | Facility: CLINIC | Age: 75
End: 2020-08-17

## 2020-09-02 ENCOUNTER — TELEPHONE (OUTPATIENT)
Dept: GASTROENTEROLOGY | Facility: CLINIC | Age: 75
End: 2020-09-02

## 2020-09-04 DIAGNOSIS — K74.3 PRIMARY BILIARY CHOLANGITIS (H): ICD-10-CM

## 2020-09-04 LAB
ALBUMIN SERPL-MCNC: 3.8 G/DL (ref 3.4–5)
ALP SERPL-CCNC: 139 U/L (ref 40–150)
ALT SERPL W P-5'-P-CCNC: 18 U/L (ref 0–50)
ANION GAP SERPL CALCULATED.3IONS-SCNC: 3 MMOL/L (ref 3–14)
AST SERPL W P-5'-P-CCNC: 17 U/L (ref 0–45)
BILIRUB DIRECT SERPL-MCNC: 0.1 MG/DL (ref 0–0.2)
BILIRUB SERPL-MCNC: 0.6 MG/DL (ref 0.2–1.3)
BUN SERPL-MCNC: 19 MG/DL (ref 7–30)
CALCIUM SERPL-MCNC: 8.6 MG/DL (ref 8.5–10.1)
CHLORIDE SERPL-SCNC: 108 MMOL/L (ref 94–109)
CO2 SERPL-SCNC: 29 MMOL/L (ref 20–32)
CREAT SERPL-MCNC: 0.69 MG/DL (ref 0.52–1.04)
ERYTHROCYTE [DISTWIDTH] IN BLOOD BY AUTOMATED COUNT: 14.2 % (ref 10–15)
GFR SERPL CREATININE-BSD FRML MDRD: 84 ML/MIN/{1.73_M2}
GLUCOSE SERPL-MCNC: 92 MG/DL (ref 70–99)
HCT VFR BLD AUTO: 36.3 % (ref 35–47)
HGB BLD-MCNC: 11.6 G/DL (ref 11.7–15.7)
INR PPP: 1.05 (ref 0.86–1.14)
MCH RBC QN AUTO: 26.7 PG (ref 26.5–33)
MCHC RBC AUTO-ENTMCNC: 32 G/DL (ref 31.5–36.5)
MCV RBC AUTO: 84 FL (ref 78–100)
PLATELET # BLD AUTO: 247 10E9/L (ref 150–450)
POTASSIUM SERPL-SCNC: 4.2 MMOL/L (ref 3.4–5.3)
PROT SERPL-MCNC: 7.4 G/DL (ref 6.8–8.8)
RBC # BLD AUTO: 4.34 10E12/L (ref 3.8–5.2)
SODIUM SERPL-SCNC: 140 MMOL/L (ref 133–144)
WBC # BLD AUTO: 7.4 10E9/L (ref 4–11)

## 2020-09-04 PROCEDURE — 80076 HEPATIC FUNCTION PANEL: CPT | Performed by: INTERNAL MEDICINE

## 2020-09-04 PROCEDURE — 36415 COLL VENOUS BLD VENIPUNCTURE: CPT | Performed by: INTERNAL MEDICINE

## 2020-09-04 PROCEDURE — 80048 BASIC METABOLIC PNL TOTAL CA: CPT | Performed by: INTERNAL MEDICINE

## 2020-09-04 PROCEDURE — 85610 PROTHROMBIN TIME: CPT | Performed by: INTERNAL MEDICINE

## 2020-09-04 PROCEDURE — 85027 COMPLETE CBC AUTOMATED: CPT | Performed by: INTERNAL MEDICINE

## 2020-09-08 ENCOUNTER — VIRTUAL VISIT (OUTPATIENT)
Dept: GASTROENTEROLOGY | Facility: CLINIC | Age: 75
End: 2020-09-08
Attending: INTERNAL MEDICINE
Payer: MEDICARE

## 2020-09-08 DIAGNOSIS — K74.3 PRIMARY BILIARY CHOLANGITIS (H): ICD-10-CM

## 2020-09-08 RX ORDER — OXYBUTYNIN CHLORIDE 10 MG/1
10 TABLET, EXTENDED RELEASE ORAL EVERY 24 HOURS
COMMUNITY
Start: 2020-08-19 | End: 2021-09-20

## 2020-09-08 RX ORDER — URSODIOL 500 MG/1
500 TABLET, FILM COATED ORAL 3 TIMES DAILY
Qty: 270 TABLET | Refills: 3 | Status: SHIPPED | OUTPATIENT
Start: 2020-09-08 | End: 2021-09-20

## 2020-09-08 RX ORDER — NICOTINE POLACRILEX 4 MG/1
20 GUM, CHEWING ORAL EVERY EVENING
COMMUNITY

## 2020-09-08 ASSESSMENT — PAIN SCALES - GENERAL: PAINLEVEL: NO PAIN (0)

## 2020-09-08 NOTE — PROGRESS NOTES
"Gail Dewitt is a 75 year old female who is being evaluated via a billable telephone visit.      The patient has been notified of following:     \"This telephone visit will be conducted via a call between you and your physician/provider. We have found that certain health care needs can be provided without the need for a physical exam.  This service lets us provide the care you need with a short phone conversation.  If a prescription is necessary we can send it directly to your pharmacy.  If lab work is needed we can place an order for that and you can then stop by our lab to have the test done at a later time.    Telephone visits are billed at different rates depending on your insurance coverage. During this emergency period, for some insurers they may be billed the same as an in-person visit.  Please reach out to your insurance provider with any questions.    If during the course of the call the physician/provider feels a telephone visit is not appropriate, you will not be charged for this service.\"    Patient has given verbal consent for Telephone visit?  Yes    What phone number would you like to be contacted at? [phone]    How would you like to obtain your AVS? Jerilynt    Phone call duration: 11 minutes  _________________________________________________________________________    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  Patient was started on oxybutinin recently for urinary incontinence which is helping.  She denies any ER visits or hospital admissions since last clinic visit.    Patient is well today.  She denies any symptoms specific to liver disease.    Patient denies jaundice, lower extremity edema, abdominal distension, lethargy or " confusion.    Patient denies melena, hematemesis or hematochezia.    Patient denies fevers, sweats or chills.  No cough or dyspnea.    Weight stable.  Appetite is good.    Patient rarely if ever drink alcohol now.  Last ETOH in Feb for her birthday.  She is social distancing and wearing a mask in public during the pandemic.      Medical hx Surgical hx   Past Medical History:   Diagnosis Date     Dyslipidemia      Eczema      Need for prophylactic hormone replacement therapy (postmenopausal)      Obesity      Unspecified essential hypertension      Unspecified hypothyroidism       Past Surgical History:   Procedure Laterality Date     APPENDECTOMY       BREAST BIOPSY, RT/LT Right 1970     COLPORRHAPHY ANTERIOR  12/6/2010    COLPORRHAPHY ANTERIOR performed by MAY RIVERA at [facility]     CYSTOCELE REPAIR  2007     HYSTERECTOMY, JOSEE  1978    Hysterectomy     INJECT EPIDURAL LUMBAR  10/19/2011    Procedure:INJECT EPIDURAL LUMBAR; DESHAWN-; Surgeon:GENERIC ANESTHESIA PROVIDER; Location:[facility]     INJECT JOINT SACROILIAC  2/7/2011    INJECT JOINT SACROILIAC performed by GENERIC ANESTHESIA PROVIDER at [facility] OR     INJECT JOINT SACROILIAC  4/25/2012    Procedure:INJECT JOINT SACROILIAC; DESHAWN SI Joint --; Surgeon:GENERIC ANESTHESIA PROVIDER; Location:[facility]     PHACOEMULSIFICATION WITH STANDARD INTRAOCULAR LENS IMPLANT  5/26/2011    Procedure:PHACOEMULSIFICATION WITH STANDARD INTRAOCULAR LENS IMPLANT; Surgeon:JOSÉ MANUEL DORSEY; Location:[facility]     PHACOEMULSIFICATION WITH STANDARD INTRAOCULAR LENS IMPLANT  6/9/2011    Procedure:PHACOEMULSIFICATION WITH STANDARD INTRAOCULAR LENS IMPLANT; Surgeon:JOSÉ MANUEL DORSEY; Location:[facility]     RECTOCELE REPAIR  2007     SURGICAL HISTORY OF -       bilateral foot surgery; subtalar joint fusion; bunion     SURGICAL HISTORY OF -   2009    Posterior colporrhaphy w/ posterior Pro-Lift     TONSILLECTOMY            Medications  Prior to Admission medications    Medication Sig Start  Date End Date Taking? Authorizing Provider   ASPIRIN 81 MG OR TABS 1 TABLET BY MOUTH DAILY 1/8/07  Yes Sara Jordan MD   Calcium Carbonate-Vit D-Min (CALCIUM 1200 PO) Take 1 tablet by mouth daily   Yes Reported, Patient   Cholecalciferol (VITAMIN D) 2000 UNITS CAPS Take  by mouth daily.   Yes Reported, Patient   Diphenhydramine-APAP, sleep, (TYLENOL PM EXTRA STRENGTH PO) Take by mouth At Bedtime   Yes Reported, Patient   levothyroxine (SYNTHROID/LEVOTHROID) 125 MCG tablet TAKE ONE TABLET BY MOUTH ONE TIME DAILY 2/15/18  Yes Aliyah Aggarwal MD   losartan (COZAAR) 50 MG tablet Take 1 tablet (50 mg) by mouth daily 10/2/17  Yes Aliyah Aggarwal MD   omeprazole 20 MG tablet Take 20 mg by mouth daily   Yes Reported, Patient   oxybutynin ER (DITROPAN-XL) 10 MG 24 hr tablet Take 10 mg by mouth every 24 hours 8/19/20  Yes Reported, Patient   propylene glycol (SYSTANE BALANCE) 0.6 % SOLN ophthalmic solution 1 drop   Yes Reported, Patient   ursodiol (ACTIGALL) 500 MG tablet Take 1 tablet (500 mg) by mouth 3 times daily 9/8/20  Yes Dereck Way MD       Allergies  Allergies   Allergen Reactions     Ace Inhibitors Cough     Amlodipine Besylate      Swellings       Dilaudid [Hydromorphone Hcl] Hives     Percocet [Oxycodone-Acetaminophen] Other (See Comments)     Severe dizziness         Review of systems  A 10-point review of systems was negative    Examination  There were no vitals taken for this visit.  There is no height or weight on file to calculate BMI.    Gen- well, NAD, A+Ox3, normal color  Lym- no palpable LAD  CVS- RRR  RS- CTA  Abd-   Extr- hands normal, no YUMI  Skin- no rash or jaundice  Neuro- no asterixis  Psych- normal mood    Laboratory  Lab Results   Component Value Date     09/04/2020    POTASSIUM 4.2 09/04/2020    CHLORIDE 108 09/04/2020    CO2 29 09/04/2020    BUN 19 09/04/2020    CR 0.69 09/04/2020       Lab Results   Component Value Date    BILITOTAL 0.6 09/04/2020     ALT 18 09/04/2020    AST 17 09/04/2020    ALKPHOS 139 09/04/2020       Lab Results   Component Value Date    ALBUMIN 3.8 09/04/2020    PROTTOTAL 7.4 09/04/2020        Lab Results   Component Value Date    WBC 7.4 09/04/2020    HGB 11.6 09/04/2020    MCV 84 09/04/2020     09/04/2020       Lab Results   Component Value Date    INR 1.05 09/04/2020       Radiology  Nil recent     Assessment  75 year old female who presents for routine follow-up of primary biliary cholangitis.  Liver function tests normal on appropriately dosed ursodiol.  No changes to current management.    Plan  1.  Continue ursodiol 500mg PO TID- rx refilled  2.  Follow-up in 12 months    Dereck Aguilar MD  Hepatology  [facility]

## 2020-09-08 NOTE — LETTER
"    9/8/2020         RE: Gail eDwitt  [address]  [place] [address]        Dear Colleague,    Thank you for referring your patient, Gail Dewitt, to the [facility] HEPATOLOG[facility]. Please see a copy of my visit note below.    Gail Dewitt is a 75 year old female who is being evaluated via a billable telephone visit.      The patient has been notified of following:     \"This telephone visit will be conducted via a call between you and your physician/provider. We have found that certain health care needs can be provided without the need for a physical exam.  This service lets us provide the care you need with a short phone conversation.  If a prescription is necessary we can send it directly to your pharmacy.  If lab work is needed we can place an order for that and you can then stop by our lab to have the test done at a later time.    Telephone visits are billed at different rates depending on your insurance coverage. During this emergency period, for some insurers they may be billed the same as an in-person visit.  Please reach out to your insurance provider with any questions.    If during the course of the call the physician/provider feels a telephone visit is not appropriate, you will not be charged for this service.\"    Patient has given verbal consent for Telephone visit?  Yes    What phone number would you like to be contacted at? [phone]    How would you like to obtain your AVS? 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  Patient was started on oxybutinin recently for urinary incontinence which is " helping.  She denies any ER visits or hospital admissions since last clinic visit.    Patient is well today.  She denies any symptoms specific to liver disease.    Patient denies jaundice, lower extremity edema, abdominal distension, lethargy or confusion.    Patient denies melena, hematemesis or hematochezia.    Patient denies fevers, sweats or chills.  No cough or dyspnea.    Weight stable.  Appetite is good.    Patient rarely if ever drink alcohol now.  Last ETOH in Feb for her birthday.  She is social distancing and wearing a mask in public during the pandemic.      Medical hx Surgical hx   Past Medical History:   Diagnosis Date     Dyslipidemia      Eczema      Need for prophylactic hormone replacement therapy (postmenopausal)      Obesity      Unspecified essential hypertension      Unspecified hypothyroidism       Past Surgical History:   Procedure Laterality Date     APPENDECTOMY       BREAST BIOPSY, RT/LT Right 1970     COLPORRHAPHY ANTERIOR  12/6/2010    COLPORRHAPHY ANTERIOR performed by MAY RIVERA at [facility]     CYSTOCELE REPAIR  2007     HYSTERECTOMY, JOSEE  1978    Hysterectomy     INJECT EPIDURAL LUMBAR  10/19/2011    Procedure:INJECT EPIDURAL LUMBAR; DESHAWN-; Surgeon:GENERIC ANESTHESIA PROVIDER; Location:[facility]     INJECT JOINT SACROILIAC  2/7/2011    INJECT JOINT SACROILIAC performed by GENERIC ANESTHESIA PROVIDER at [facility]     INJECT JOINT SACROILIAC  4/25/2012    Procedure:INJECT JOINT SACROILIAC; DESHAWN SI Joint --; Surgeon:GENERIC ANESTHESIA PROVIDER; Location:[facility]     PHACOEMULSIFICATION WITH STANDARD INTRAOCULAR LENS IMPLANT  5/26/2011    Procedure:PHACOEMULSIFICATION WITH STANDARD INTRAOCULAR LENS IMPLANT; Surgeon:JOSÉ MANUEL DORSEY; Location:[facility]     PHACOEMULSIFICATION WITH STANDARD INTRAOCULAR LENS IMPLANT  6/9/2011    Procedure:PHACOEMULSIFICATION WITH STANDARD INTRAOCULAR LENS IMPLANT; Surgeon:JOSÉ MANUEL DORSEY; Location:[facility]     RECTOCELE REPAIR  2007     SURGICAL  HISTORY OF -       bilateral foot surgery; subtalar joint fusion; bunion     SURGICAL HISTORY OF -   2009    Posterior colporrhaphy w/ posterior Pro-Lift     TONSILLECTOMY            Medications  Prior to Admission medications    Medication Sig Start Date End Date Taking? Authorizing Provider   ASPIRIN 81 MG OR TABS 1 TABLET BY MOUTH DAILY 1/8/07  Yes Sara Jordan MD   Calcium Carbonate-Vit D-Min (CALCIUM 1200 PO) Take 1 tablet by mouth daily   Yes Reported, Patient   Cholecalciferol (VITAMIN D) 2000 UNITS CAPS Take  by mouth daily.   Yes Reported, Patient   Diphenhydramine-APAP, sleep, (TYLENOL PM EXTRA STRENGTH PO) Take by mouth At Bedtime   Yes Reported, Patient   levothyroxine (SYNTHROID/LEVOTHROID) 125 MCG tablet TAKE ONE TABLET BY MOUTH ONE TIME DAILY 2/15/18  Yes Aliyah Aggarwal MD   losartan (COZAAR) 50 MG tablet Take 1 tablet (50 mg) by mouth daily 10/2/17  Yes Aliyah Aggarwal MD   omeprazole 20 MG tablet Take 20 mg by mouth daily   Yes Reported, Patient   oxybutynin ER (DITROPAN-XL) 10 MG 24 hr tablet Take 10 mg by mouth every 24 hours 8/19/20  Yes Reported, Patient   propylene glycol (SYSTANE BALANCE) 0.6 % SOLN ophthalmic solution 1 drop   Yes Reported, Patient   ursodiol (ACTIGALL) 500 MG tablet Take 1 tablet (500 mg) by mouth 3 times daily 9/8/20  Yes Aguilar Dereck Sims MD       Allergies  Allergies   Allergen Reactions     Ace Inhibitors Cough     Amlodipine Besylate      Swellings       Dilaudid [Hydromorphone Hcl] Hives     Percocet [Oxycodone-Acetaminophen] Other (See Comments)     Severe dizziness         Review of systems  A 10-point review of systems was negative    Examination  There were no vitals taken for this visit.  There is no height or weight on file to calculate BMI.    Gen- well, NAD, A+Ox3, normal color  Lym- no palpable LAD  CVS- RRR  RS- CTA  Abd-   Extr- hands normal, no YUMI  Skin- no rash or jaundice  Neuro- no asterixis  Psych- normal  mood    Laboratory  Lab Results   Component Value Date     09/04/2020    POTASSIUM 4.2 09/04/2020    CHLORIDE 108 09/04/2020    CO2 29 09/04/2020    BUN 19 09/04/2020    CR 0.69 09/04/2020       Lab Results   Component Value Date    BILITOTAL 0.6 09/04/2020    ALT 18 09/04/2020    AST 17 09/04/2020    ALKPHOS 139 09/04/2020       Lab Results   Component Value Date    ALBUMIN 3.8 09/04/2020    PROTTOTAL 7.4 09/04/2020        Lab Results   Component Value Date    WBC 7.4 09/04/2020    HGB 11.6 09/04/2020    MCV 84 09/04/2020     09/04/2020       Lab Results   Component Value Date    INR 1.05 09/04/2020       Radiology  Nil recent     Assessment  75 year old female who presents for routine follow-up of primary biliary cholangitis.  Liver function tests normal on appropriately dosed ursodiol.  No changes to current management.    Plan  1.  Continue ursodiol 500mg PO TID- rx refilled  2.  Follow-up in 12 months    Dereck Aguilar MD  Hepatology  [facility]

## 2021-07-02 ENCOUNTER — TRANSCRIBE ORDERS (OUTPATIENT)
Dept: OTHER | Age: 76
End: 2021-07-02

## 2021-07-02 DIAGNOSIS — M17.12 OSTEOARTHRITIS OF LEFT KNEE, UNSPECIFIED OSTEOARTHRITIS TYPE: Primary | ICD-10-CM

## 2021-07-06 ENCOUNTER — HOSPITAL ENCOUNTER (OUTPATIENT)
Dept: PHYSICAL THERAPY | Facility: CLINIC | Age: 76
Setting detail: THERAPIES SERIES
End: 2021-07-06
Attending: ORTHOPAEDIC SURGERY
Payer: MEDICARE

## 2021-07-06 DIAGNOSIS — M17.12 OSTEOARTHRITIS OF LEFT KNEE, UNSPECIFIED OSTEOARTHRITIS TYPE: ICD-10-CM

## 2021-07-06 PROCEDURE — 97110 THERAPEUTIC EXERCISES: CPT | Mod: GP | Performed by: PHYSICAL MEDICINE & REHABILITATION

## 2021-07-06 PROCEDURE — 97530 THERAPEUTIC ACTIVITIES: CPT | Mod: GP | Performed by: PHYSICAL MEDICINE & REHABILITATION

## 2021-07-06 PROCEDURE — 97161 PT EVAL LOW COMPLEX 20 MIN: CPT | Mod: GP | Performed by: PHYSICAL MEDICINE & REHABILITATION

## 2021-07-06 NOTE — PROGRESS NOTES
"[facility]          OUTPATIENT PHYSICAL THERAPY ORTHOPEDIC EVALUATION  PLAN OF TREATMENT FOR OUTPATIENT REHABILITATION  (COMPLETE FOR INITIAL CLAIMS ONLY)  Patient's Last Name, First Name, M.I.  YOB: 1945  EsdrasGail  MIREYA    Provider s Name:  [facility]   Medical Record No.  [id number]   Start of Care Date:  07/06/21   Onset Date:  06/06/21(\"started a month ago\")   Type:     _X__PT   ___OT   ___SLP Medical Diagnosis:  Osteoarthritis of left knee, unspecified osteoarthritis type      PT Diagnosis:  L knee pain   Visits from SOC:  1      _________________________________________________________________________________  Plan of Treatment/Functional Goals:  ADL retraining, balance training, bed mobility training, gait training, joint mobilization, manual therapy, motor coordination training, neuromuscular re-education, ROM, strengthening, stretching, transfer training     Cryotherapy, Electrical stimulation, Hot packs, TENS, Ultrasound  only as needed  Goals  Goal Identifier: 1  Goal Description: Pt will achieve 0* L knee extension in order to decrease difficulty with with amb.   Target Date: 07/20/21    Goal Identifier: 2  Goal Description: Pt will be able to ride in car 20 minutes without increase in sx, in order to decrease difficulty with trip to [place].  Target Date: 08/03/21    Goal Identifier: 3  Goal Description: Pt will be able to stand 20 minutes without pain in order to decrease difficulty with ADLs.  Target Date: 08/17/21    Goal Identifier: 4  Goal Description: Pt will be independent with HEP in order to self manage symptoms.  Target Date: 09/03/21      Therapy Frequency:  1 time/week  Predicted Duration of Therapy Intervention:  8 weeks    Beronica Meehan PT                 I CERTIFY THE NEED FOR THESE SERVICES FURNISHED UNDER        THIS PLAN OF TREATMENT AND WHILE UNDER MY CARE .             Physician Signature               " Date    X_____________________________________________________                             Certification Date From:  07/06/21   Certification Date To:  09/03/21    Referring Provider:  Dr. Quinton Cleary    Initial Assessment        See Epic Evaluation Start of Care Date: 07/06/21

## 2021-07-06 NOTE — PROGRESS NOTES
"   07/06/21 1400   General Information   Type of Visit Initial OP Ortho PT Evaluation   Start of Care Date 07/06/21   Referring Physician Dr. Quinton Cleary   Patient/Family Goals Statement just be able to \"do my thing\" without pain, improve riding in the car, improve pain with housework/standing/walking   Orders Evaluate and Treat   Date of Order 07/02/21   Certification Required? Yes  (Medicare)   Medical Diagnosis Osteoarthritis of left knee, unspecified osteoarthritis type    Surgical/Medical history reviewed Yes   Precautions/Limitations no known precautions/limitations   General Information Comments PMHx per pt report: high BP, foot surgery B   Body Part(s)   Body Part(s) Knee   Presentation and Etiology   Pertinent history of current problem (include personal factors and/or comorbidities that impact the POC) Pt arrived to PT today for L knee pain that started about a month ago. Notes she saw ortho surgeon and said knee is \"shot.\" States pain is not present every day. States pain does radiate into L hip. SHared 3 surgeries on L foot and 1 on the right.    Impairments A. Pain;F. Decreased strength and endurance;G. Impaired balance;H. Impaired gait;M. Locking or catching   Functional Limitations perform activities of daily living;perform desired leisure / sports activities   Symptom Location L knee   How/Where did it occur From insidious onset   Onset date of current episode/exacerbation 06/06/21  (\"started a month ago\")   Chronicity Chronic   Pain rating (0-10 point scale) Best (/10);Worst (/10)   Best (/10) 0   Worst (/10) 8   Pain quality A. Sharp;C. Aching;D. Burning   Frequency of pain/symptoms B. Intermittent   Pain/symptoms are: Worse during the day   Pain/symptoms exacerbated by G. Certain positions;J. ADL;K. Home tasks   Pain/symptoms eased by A. Sitting;C. Rest;E. Changing positions   Progression of symptoms since onset: Worsened   Prior Level of Function   Prior Level of Function-Mobility independent " "  Prior Level of Function-ADLs independent   Current Level of Function   Current Community Support Family/friend caregiver   Patient role/employment history F. Retired   Living environment House/townhome   Home/community accessibility lives in 1 story home   Current equipment-Gait/Locomotion None   Fall Risk Screen   Fall screen completed by PT   Have you fallen 2 or more times in the past year? Yes   Have you fallen and had an injury in the past year? No   Timed Up and Go score (seconds) 9   Is patient a fall risk? No   Abuse Screen (yes response referral indicated)   Feels Unsafe at Home or Work/School no   Feels Threatened by Someone no   Does Anyone Try to Keep You From Having Contact with Others or Doing Things Outside Your Home? no   Physical Signs of Abuse Present no   Functional Scales   Functional Scales Other   Other Scales  LEFS: 32/80   Knee Objective Findings   Side (if bilateral, select both right and left) Left   Integumentary  noted edema along medial aspect of L knee   Posture rounded shoulders, forward head   Gait/Locomotion ascends stairs iwth L LE, descends with R. limited toe off on L, hip drop on R   Knee/Hip Strength Comments Hip flexion: 5-/5 B, Hip IR: 4+/5 B (cavitation on L), Hip ER: 4+/5 B, Hip ADD seated: 5/5 (noted medial knee pain)   Step Test Height 6\"   Step Test Height Control Comment hip drop   Anterior Drawer Test neg   Posterior Drawer Test neg   Varus Stress Test neg   Valgus Stress Test neg   Mariah's Test increased pain   Palpation noted pain over patellar T, distal hip abd, lateral tibiofemoral jt line, medial tibial femoral jt line, distal hip add, distal lateral hamstring   Accessory Motion/Joint Mobility hypomobility of patellofemoral and tibiofemoral jts   Left Knee Extension AROM lackig 2*, pain   Left Knee Extension PROM lacking 2*   Left Knee Flexion AROM 120*   Left Knee Flexion PROM 122*   Left Knee Flexion Strength 4+/5   Left Knee Extension Strength 5-/5, pain "   Left Hip Abduction Strength seated: 5/5   Left Quad Set Strength good   L VMO Strength good   Left Gastrocnemius Flexibility limited   Left Hamstring Flexibility limited   Left Hip Flexor Flexibility limited   Left Quadricep Flexibility wnl   Planned Therapy Interventions   Planned Therapy Interventions ADL retraining;balance training;bed mobility training;gait training;joint mobilization;manual therapy;motor coordination training;neuromuscular re-education;ROM;strengthening;stretching;transfer training   Planned Modality Interventions   Planned Modality Interventions Cryotherapy;Electrical stimulation;Hot packs;TENS;Ultrasound   Planned Modality Interventions Comments only as needed   Clinical Impression   Criteria for Skilled Therapeutic Interventions Met yes, treatment indicated   PT Diagnosis L knee pain   Influenced by the following impairments decreased ROM, decreased strength, impaired gait, pain   Functional limitations due to impairments walking, standing, transfers, ADLS, riding in car   Clinical Presentation Stable/Uncomplicated   Clinical Presentation Rationale ROM, strength, high motivaiton, PLOF, LEFS, clinical judgement   Clinical Decision Making (Complexity) Low complexity   Therapy Frequency 1 time/week   Predicted Duration of Therapy Intervention (days/wks) 8 weeks   Risk & Benefits of therapy have been explained Yes   Patient, Family & other staff in agreement with plan of care Yes   Clinical Impression Comments Pt is a 76 y.o. female who presented to the PT clinic today with a rehab diagnosis of L knee pain as evidenced by decreased ROM, decreased strength, impaired gait, and pain. More specifically, pt sx consistent with OA. Pt appropriate for skilled PT to address previously listed impairments in order to decrease difficulty with walking, standing, riding in the car and ADLs.    Education Assessment   Preferred Learning Style Listening;Reading;Demonstration;Pictures/video   Barriers to  Learning No barriers   ORTHO GOALS   PT Ortho Eval Goals 1;2;3;4   Ortho Goal 1   Goal Identifier 1   Goal Description Pt will achieve 0* L knee extension in order to decrease difficulty with with amb.    Target Date 07/20/21   Ortho Goal 2   Goal Identifier 2   Goal Description Pt will be able to ride in car 20 minutes without increase in sx, in order to decrease difficulty with trip to [place].   Target Date 08/03/21   Ortho Goal 3   Goal Identifier 3   Goal Description Pt will be able to stand 20 minutes without pain in order to decrease difficulty with ADLs.   Target Date 08/17/21   Ortho Goal 4   Goal Identifier 4   Goal Description Pt will be independent with HEP in order to self manage symptoms.   Target Date 09/03/21   Total Evaluation Time   PT Eval, Low Complexity Minutes (75718) 25   Therapy Certification   Certification date from 07/06/21   Certification date to 09/03/21   Medical Diagnosis Osteoarthritis of left knee, unspecified osteoarthritis type        Please contact me with any questions or concerns.    Thank you for your referral,     Beronica Meehan, PT, DPT  Physical Therapist  [address] [address] [phone]

## 2021-07-13 ENCOUNTER — HOSPITAL ENCOUNTER (OUTPATIENT)
Dept: PHYSICAL THERAPY | Facility: CLINIC | Age: 76
Setting detail: THERAPIES SERIES
End: 2021-07-13
Attending: ORTHOPAEDIC SURGERY
Payer: MEDICARE

## 2021-07-13 PROCEDURE — 97110 THERAPEUTIC EXERCISES: CPT | Mod: GP | Performed by: PHYSICAL MEDICINE & REHABILITATION

## 2021-07-20 ENCOUNTER — HOSPITAL ENCOUNTER (OUTPATIENT)
Dept: PHYSICAL THERAPY | Facility: CLINIC | Age: 76
Setting detail: THERAPIES SERIES
End: 2021-07-20
Attending: ORTHOPAEDIC SURGERY
Payer: MEDICARE

## 2021-07-20 PROCEDURE — 97140 MANUAL THERAPY 1/> REGIONS: CPT | Mod: GP | Performed by: PHYSICAL MEDICINE & REHABILITATION

## 2021-07-20 PROCEDURE — 97110 THERAPEUTIC EXERCISES: CPT | Mod: GP | Performed by: PHYSICAL MEDICINE & REHABILITATION

## 2021-07-28 ENCOUNTER — HOSPITAL ENCOUNTER (OUTPATIENT)
Dept: PHYSICAL THERAPY | Facility: CLINIC | Age: 76
Setting detail: THERAPIES SERIES
End: 2021-07-28
Attending: ORTHOPAEDIC SURGERY
Payer: MEDICARE

## 2021-07-28 PROCEDURE — 97110 THERAPEUTIC EXERCISES: CPT | Mod: GP | Performed by: PHYSICAL MEDICINE & REHABILITATION

## 2021-08-02 ENCOUNTER — DOCUMENTATION ONLY (OUTPATIENT)
Dept: LAB | Facility: CLINIC | Age: 76
End: 2021-08-02

## 2021-08-02 DIAGNOSIS — E03.9 HYPOTHYROIDISM, UNSPECIFIED TYPE: Primary | ICD-10-CM

## 2021-08-04 ENCOUNTER — LAB (OUTPATIENT)
Dept: LAB | Facility: CLINIC | Age: 76
End: 2021-08-04
Payer: MEDICARE

## 2021-08-04 ENCOUNTER — HOSPITAL ENCOUNTER (OUTPATIENT)
Dept: PHYSICAL THERAPY | Facility: CLINIC | Age: 76
Setting detail: THERAPIES SERIES
End: 2021-08-04
Attending: ORTHOPAEDIC SURGERY
Payer: MEDICARE

## 2021-08-04 ENCOUNTER — DOCUMENTATION ONLY (OUTPATIENT)
Dept: LAB | Facility: CLINIC | Age: 76
End: 2021-08-04

## 2021-08-04 DIAGNOSIS — K74.3 PRIMARY BILIARY CHOLANGITIS (H): Primary | ICD-10-CM

## 2021-08-04 DIAGNOSIS — K74.3 PRIMARY BILIARY CHOLANGITIS (H): ICD-10-CM

## 2021-08-04 DIAGNOSIS — E03.9 HYPOTHYROIDISM, UNSPECIFIED TYPE: ICD-10-CM

## 2021-08-04 LAB
ALBUMIN SERPL-MCNC: 3.4 G/DL (ref 3.4–5)
ALP SERPL-CCNC: 129 U/L (ref 40–150)
ALT SERPL W P-5'-P-CCNC: 18 U/L (ref 0–50)
ANION GAP SERPL CALCULATED.3IONS-SCNC: 7 MMOL/L (ref 3–14)
AST SERPL W P-5'-P-CCNC: 18 U/L (ref 0–45)
BILIRUB DIRECT SERPL-MCNC: 0.2 MG/DL (ref 0–0.2)
BILIRUB SERPL-MCNC: 0.5 MG/DL (ref 0.2–1.3)
BUN SERPL-MCNC: 17 MG/DL (ref 7–30)
CALCIUM SERPL-MCNC: 8.7 MG/DL (ref 8.5–10.1)
CHLORIDE BLD-SCNC: 105 MMOL/L (ref 94–109)
CO2 SERPL-SCNC: 27 MMOL/L (ref 20–32)
CREAT SERPL-MCNC: 0.63 MG/DL (ref 0.52–1.04)
ERYTHROCYTE [DISTWIDTH] IN BLOOD BY AUTOMATED COUNT: 15.4 % (ref 10–15)
GFR SERPL CREATININE-BSD FRML MDRD: 87 ML/MIN/1.73M2
GLUCOSE BLD-MCNC: 94 MG/DL (ref 70–99)
HCT VFR BLD AUTO: 37.5 % (ref 35–47)
HGB BLD-MCNC: 12.4 G/DL (ref 11.7–15.7)
HOLD SPECIMEN: NORMAL
INR PPP: 0.99 (ref 0.85–1.15)
MCH RBC QN AUTO: 27.8 PG (ref 26.5–33)
MCHC RBC AUTO-ENTMCNC: 33.1 G/DL (ref 31.5–36.5)
MCV RBC AUTO: 84 FL (ref 78–100)
PLATELET # BLD AUTO: 233 10E3/UL (ref 150–450)
POTASSIUM BLD-SCNC: 4.1 MMOL/L (ref 3.4–5.3)
PROT SERPL-MCNC: 7.1 G/DL (ref 6.8–8.8)
RBC # BLD AUTO: 4.46 10E6/UL (ref 3.8–5.2)
SODIUM SERPL-SCNC: 139 MMOL/L (ref 133–144)
TSH SERPL DL<=0.005 MIU/L-ACNC: 2.5 MU/L (ref 0.4–4)
WBC # BLD AUTO: 5.6 10E3/UL (ref 4–11)

## 2021-08-04 PROCEDURE — 82248 BILIRUBIN DIRECT: CPT

## 2021-08-04 PROCEDURE — 97110 THERAPEUTIC EXERCISES: CPT | Mod: GP | Performed by: PHYSICAL MEDICINE & REHABILITATION

## 2021-08-04 PROCEDURE — 85610 PROTHROMBIN TIME: CPT

## 2021-08-04 PROCEDURE — 84443 ASSAY THYROID STIM HORMONE: CPT

## 2021-08-04 PROCEDURE — 36415 COLL VENOUS BLD VENIPUNCTURE: CPT

## 2021-08-04 PROCEDURE — 85027 COMPLETE CBC AUTOMATED: CPT

## 2021-08-04 PROCEDURE — 80053 COMPREHEN METABOLIC PANEL: CPT

## 2021-08-04 NOTE — LETTER
August 5, 2021      Gail Dewitt  [address] [facility] [address]        Dear MsCy,    We are writing to inform you of your test results.    Thyroid function is normal       Resulted Orders   TSH with free T4 reflex   Result Value Ref Range    TSH 2.50 0.40 - 4.00 mU/L       If you have any questions or concerns, please call the clinic at the number listed above.       Sincerely,      Aliyah Aggarwal MD/candido

## 2021-08-04 NOTE — LETTER
August 10, 2021       TO: Gail Dewitt  [address] [facility] [address]       Dear Ms. Dewitt,    We are writing to inform you of your test results.    Your liver, kidney and blood counts are all normal.     Dr. Aguilar will go through your medical history over the past year at your upcoming visit.     RegardsJohnny PA-C   [facility]

## 2021-08-04 NOTE — PROGRESS NOTES
Patient was in for annual labs this morning.  The blood was collected.  Please place future orders ASAP.  Thanks!

## 2021-08-10 ENCOUNTER — HOSPITAL ENCOUNTER (OUTPATIENT)
Dept: PHYSICAL THERAPY | Facility: CLINIC | Age: 76
Setting detail: THERAPIES SERIES
End: 2021-08-10
Attending: ORTHOPAEDIC SURGERY
Payer: MEDICARE

## 2021-08-10 PROCEDURE — 97110 THERAPEUTIC EXERCISES: CPT | Mod: GP | Performed by: PHYSICAL MEDICINE & REHABILITATION

## 2021-08-10 NOTE — PROGRESS NOTES
Outpatient Physical Therapy Discharge Note     Patient: Gail Dewitt  : 1945    Beginning/End Dates of Reporting Period:  2021 to 8/10/2021    Referring Provider: Dr. Quinton Cleary    Therapy Diagnosis: L knee pain     Client Self Report: Pt reports feeling better this week. Notes it took groin pain 5-6 days to feel better before she could do her exercises.     Objective Measurements:  Objective Measure: L knee ROM  Details: extension: 0*,  flexion: 124*  Objective Measure: L knee strength  Details: 5-/5 global strength    Outcome Measures (most recent score):  LEFS: Not assessed today ( at initial eval)    Goals:  Goal Identifier 1   Goal Description Pt will achieve 0* L knee extension in order to decrease difficulty with with amb.    Target Date 21   Date Met  21   Progress (detail required for progress note):     Goal Identifier 2   Goal Description Pt will be able to ride in car 20 minutes without increase in sx, in order to decrease difficulty with trip to [place].   Target Date 21   Date Met      Progress (detail required for progress note): still challenging at this time     Goal Identifier 3   Goal Description Pt will be able to stand 20 minutes without pain in order to decrease difficulty with ADLs.   Target Date 21   Date Met      Progress (detail required for progress note): still challenging at this time     Goal Identifier 4   Goal Description Pt will be independent with HEP in order to self manage symptoms.   Target Date 21   Date Met  08/10/21   Progress (detail required for progress note):       Progress toward goals: Pt attended 6 PT sessions, achieving 2/4 short term and long term goals. Pt continues to have difficulty with prolonged sitting and standing despite HEP compliance. PT and pt disccused and agreed upon follow up with PCP or referring provider to discuss further pain management at this time. No further progress expected.      Plan:  Discharge from therapy.    Discharge:    Reason for Discharge: Patient has met 2/4 goals.  No further expectation of progress.    Equipment Issued: therabands    Discharge Plan: Patient to continue home program.      Please contact me with any questions or concerns.    Thank you for your referral,     Beronica Meehan, PT, DPT  Physical Therapist  [address] [address] [phone]

## 2021-09-08 ENCOUNTER — TRANSFERRED RECORDS (OUTPATIENT)
Dept: HEALTH INFORMATION MANAGEMENT | Facility: CLINIC | Age: 76
End: 2021-09-08

## 2021-09-12 ENCOUNTER — HEALTH MAINTENANCE LETTER (OUTPATIENT)
Age: 76
End: 2021-09-12

## 2021-09-17 ENCOUNTER — TRANSCRIBE ORDERS (OUTPATIENT)
Dept: OTHER | Age: 76
End: 2021-09-17

## 2021-09-17 ENCOUNTER — TELEPHONE (OUTPATIENT)
Dept: GASTROENTEROLOGY | Facility: CLINIC | Age: 76
End: 2021-09-17

## 2021-09-17 DIAGNOSIS — M25.569 KNEE PAIN: Primary | ICD-10-CM

## 2021-09-17 DIAGNOSIS — M25.562 LEFT KNEE PAIN: ICD-10-CM

## 2021-09-17 DIAGNOSIS — M19.90 OSTEOARTHRITIS: ICD-10-CM

## 2021-09-17 NOTE — TELEPHONE ENCOUNTER
Attempted to reach patient to discuss upcoming appointment with Dr. Aguilar on 9/20 and need to convert this to an in person appointment, no answer, message left requesting call back, clinic number provided.       Attempted to reach patient for check in, no answer, message left requesting call back, number provided.

## 2021-09-20 ENCOUNTER — OFFICE VISIT (OUTPATIENT)
Dept: GASTROENTEROLOGY | Facility: CLINIC | Age: 76
End: 2021-09-20
Attending: INTERNAL MEDICINE
Payer: MEDICARE

## 2021-09-20 VITALS
RESPIRATION RATE: 18 BRPM | BODY MASS INDEX: 38.45 KG/M2 | HEART RATE: 68 BPM | WEIGHT: 217 LBS | HEIGHT: 63 IN | SYSTOLIC BLOOD PRESSURE: 117 MMHG | TEMPERATURE: 97.7 F | DIASTOLIC BLOOD PRESSURE: 71 MMHG | OXYGEN SATURATION: 95 %

## 2021-09-20 DIAGNOSIS — K74.3 PRIMARY BILIARY CHOLANGITIS (H): ICD-10-CM

## 2021-09-20 PROCEDURE — 99214 OFFICE O/P EST MOD 30 MIN: CPT | Performed by: INTERNAL MEDICINE

## 2021-09-20 PROCEDURE — G0463 HOSPITAL OUTPT CLINIC VISIT: HCPCS

## 2021-09-20 RX ORDER — URSODIOL 500 MG/1
500 TABLET, FILM COATED ORAL 3 TIMES DAILY
Qty: 270 TABLET | Refills: 3 | Status: SHIPPED | OUTPATIENT
Start: 2021-09-20 | End: 2022-09-19

## 2021-09-20 ASSESSMENT — MIFFLIN-ST. JEOR: SCORE: 1434.56

## 2021-09-20 ASSESSMENT — PAIN SCALES - GENERAL: PAINLEVEL: MODERATE PAIN (5)

## 2021-09-20 NOTE — LETTER
9/20/2021         RE: Gail Dewitt  [address] [facility] [address]        Dear Colleague,    Thank you for referring your patient, Gail Dewitt, to the [facility] [place]. Please see a copy of my visit note below.    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  No new medications, ER visits or hospital admissions since last visit.  Patient is scheduled for left knee replacement at [facility] next week for osteoarthritis.    Patient is well today.  Her knee is a little more achy today compared to recent days.  She has no symptoms related to liver disease.    Patient denies itch, jaundice, lower extremity edema, abdominal distension, lethargy or confusion.    Patient denies melena, hematemesis or hematochezia.    Patient denies fevers, sweats or chills.  Patient is vaccinated again COVID.    Weight stable.  Appetite is good.    Patient rarely drinks alcohol.      Medical hx Surgical hx   Past Medical History:   Diagnosis Date     Dyslipidemia      Eczema      Need for prophylactic hormone replacement therapy (postmenopausal)      Obesity      Unspecified essential hypertension      Unspecified hypothyroidism       Past Surgical History:   Procedure Laterality Date     APPENDECTOMY       BREAST BIOPSY, RT/LT Right 1970     COLPORRHAPHY ANTERIOR  12/6/2010    COLPORRHAPHY ANTERIOR performed by MAY RIVERA at [facility]     CYSTOCELE REPAIR  2007     HYSTERECTOMY, JOSEE  1978    Hysterectomy     INJECT EPIDURAL LUMBAR  10/19/2011    Procedure:INJECT EPIDURAL LUMBAR; DESHAWN-; Surgeon:HERMILA ANESTHESIA PROVIDER; Location:[facility] OR     INJECT JOINT SACROILIAC  2/7/2011    INJECT JOINT SACROILIAC performed by GENERIC  ANESTHESIA PROVIDER at [facility] OR     INJECT JOINT SACROILIAC  4/25/2012    Procedure:INJECT JOINT SACROILIAC; DESHAWN SI Joint --; Surgeon:GENERIC ANESTHESIA PROVIDER; Location:[facility]     PHACOEMULSIFICATION WITH STANDARD INTRAOCULAR LENS IMPLANT  5/26/2011    Procedure:PHACOEMULSIFICATION WITH STANDARD INTRAOCULAR LENS IMPLANT; Surgeon:JOSÉ MANUEL DORSEY; Location:[facility]     PHACOEMULSIFICATION WITH STANDARD INTRAOCULAR LENS IMPLANT  6/9/2011    Procedure:PHACOEMULSIFICATION WITH STANDARD INTRAOCULAR LENS IMPLANT; Surgeon:JOSÉ MANUEL DORSEY; Location:[facility]     RECTOCELE REPAIR  2007     SURGICAL HISTORY OF -       bilateral foot surgery; subtalar joint fusion; bunion     SURGICAL HISTORY OF -   2009    Posterior colporrhaphy w/ posterior Pro-Lift     TONSILLECTOMY            Medications  Prior to Admission medications    Medication Sig Start Date End Date Taking? Authorizing Provider   ASPIRIN 81 MG OR TABS 1 TABLET BY MOUTH DAILY 1/8/07  Yes Sara Jordan MD   Calcium Carbonate-Vit D-Min (CALCIUM 1200 PO) Take 1 tablet by mouth daily   Yes Reported, Patient   Cholecalciferol (VITAMIN D) 2000 UNITS CAPS Take  by mouth daily.   Yes Reported, Patient   Diphenhydramine-APAP, sleep, (TYLENOL PM EXTRA STRENGTH PO) Take by mouth At Bedtime   Yes Reported, Patient   levothyroxine (SYNTHROID/LEVOTHROID) 125 MCG tablet TAKE ONE TABLET BY MOUTH ONE TIME DAILY 2/15/18  Yes Aliyah Aggarwal MD   losartan (COZAAR) 50 MG tablet Take 1 tablet (50 mg) by mouth daily 10/2/17  Yes Aliyah Aggarwal MD   omeprazole 20 MG tablet Take 20 mg by mouth daily   Yes Reported, Patient   propylene glycol (SYSTANE BALANCE) 0.6 % SOLN ophthalmic solution 1 drop   Yes Reported, Patient   ursodiol (ACTIGALL) 500 MG tablet Take 1 tablet (500 mg) by mouth 3 times daily 9/20/21  Yes Dereck Way MD       Allergies  Allergies   Allergen Reactions     Ace Inhibitors Cough     Amlodipine Besylate      Swellings        "Dilaudid [Hydromorphone Hcl] Hives     Percocet [Oxycodone-Acetaminophen] Other (See Comments)     Severe dizziness         Review of systems  A 10-point review of systems was negative    Examination  /71 (BP Location: Right arm, Patient Position: Sitting, Cuff Size: Adult Regular)   Pulse 68   Temp 97.7  F (36.5  C) (Oral)   Resp 18   Ht 1.594 m (5' 2.76\")   Wt 98.4 kg (217 lb)   SpO2 95%   BMI 38.74 kg/m    Body mass index is 38.74 kg/m .    Gen- well, NAD, A+Ox3, normal color  Lym- no palpable LAD  CVS- RRR  RS- CTA  Abd- obese, SNT, no ascites or organomegaly on palpation or percussion, BS+  Extr- hands normal, no YUMI  Skin- no rash or jaundice  Neuro- no asterixis  Psych- normal mood    Laboratory  Lab Results   Component Value Date     08/04/2021     09/04/2020    POTASSIUM 4.1 08/04/2021    POTASSIUM 4.2 09/04/2020    CHLORIDE 105 08/04/2021    CHLORIDE 108 09/04/2020    CO2 27 08/04/2021    CO2 29 09/04/2020    BUN 17 08/04/2021    BUN 19 09/04/2020    CR 0.63 08/04/2021    CR 0.69 09/04/2020       Lab Results   Component Value Date    BILITOTAL 0.5 08/04/2021    BILITOTAL 0.6 09/04/2020    ALT 18 08/04/2021    ALT 18 09/04/2020    AST 18 08/04/2021    AST 17 09/04/2020    ALKPHOS 129 08/04/2021    ALKPHOS 139 09/04/2020       Lab Results   Component Value Date    ALBUMIN 3.4 08/04/2021    ALBUMIN 3.8 09/04/2020    PROTTOTAL 7.1 08/04/2021    PROTTOTAL 7.4 09/04/2020        Lab Results   Component Value Date    WBC 5.6 08/04/2021    WBC 7.4 09/04/2020    HGB 12.4 08/04/2021    HGB 11.6 09/04/2020    MCV 84 08/04/2021    MCV 84 09/04/2020     08/04/2021     09/04/2020       Lab Results   Component Value Date    INR 0.99 08/04/2021    INR 1.05 09/04/2020       Radiology  Nil recent    Assessment  76 year old female who presents for routine follow-up of PBC.  Liver function tests normal on ursodiol.  Will continue current management on current dose of ursodiol.    Plan  1. "  Continue ursodiol 500mg PO TID- rx refilled  2.  Follow-up in 12 months    Dereck Aguilar MD  Hepatology  [facility]      Again, thank you for allowing me to participate in the care of your patient.        Sincerely,        Dereck Aguilar MD

## 2021-09-20 NOTE — PROGRESS NOTES
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  No new medications, ER visits or hospital admissions since last visit.  Patient is scheduled for left knee replacement at [facility] next week for osteoarthritis.    Patient is well today.  Her knee is a little more achy today compared to recent days.  She has no symptoms related to liver disease.    Patient denies itch, jaundice, lower extremity edema, abdominal distension, lethargy or confusion.    Patient denies melena, hematemesis or hematochezia.    Patient denies fevers, sweats or chills.  Patient is vaccinated again COVID.    Weight stable.  Appetite is good.    Patient rarely drinks alcohol.      Medical hx Surgical hx   Past Medical History:   Diagnosis Date     Dyslipidemia      Eczema      Need for prophylactic hormone replacement therapy (postmenopausal)      Obesity      Unspecified essential hypertension      Unspecified hypothyroidism       Past Surgical History:   Procedure Laterality Date     APPENDECTOMY       BREAST BIOPSY, RT/LT Right 1970     COLPORRHAPHY ANTERIOR  12/6/2010    COLPORRHAPHY ANTERIOR performed by MAY RIVERA at [facility]     CYSTOCELE REPAIR  2007     HYSTERECTOMY, JOSEE  1978    Hysterectomy     INJECT EPIDURAL LUMBAR  10/19/2011    Procedure:INJECT EPIDURAL LUMBAR; DESHAWN-; Surgeon:GENERIC ANESTHESIA PROVIDER; Location:[facility]     INJECT JOINT SACROILIAC  2/7/2011    INJECT JOINT SACROILIAC performed by GENERIC ANESTHESIA PROVIDER at [facility]     INJECT JOINT SACROILIAC  4/25/2012    Procedure:INJECT JOINT SACROILIAC; DESHAWN SI Joint --; Surgeon:GENERIC ANESTHESIA PROVIDER; Location:[facility]     PHACOEMULSIFICATION WITH STANDARD INTRAOCULAR LENS IMPLANT  5/26/2011     Procedure:PHACOEMULSIFICATION WITH STANDARD INTRAOCULAR LENS IMPLANT; Surgeon:JOSÉ MANUEL DORSEY; Location:[facility]     PHACOEMULSIFICATION WITH STANDARD INTRAOCULAR LENS IMPLANT  6/9/2011    Procedure:PHACOEMULSIFICATION WITH STANDARD INTRAOCULAR LENS IMPLANT; Surgeon:JOSÉ MANUEL DORSEY; Location:[facility]     RECTOCELE REPAIR  2007     SURGICAL HISTORY OF -       bilateral foot surgery; subtalar joint fusion; bunion     SURGICAL HISTORY OF -   2009    Posterior colporrhaphy w/ posterior Pro-Lift     TONSILLECTOMY            Medications  Prior to Admission medications    Medication Sig Start Date End Date Taking? Authorizing Provider   ASPIRIN 81 MG OR TABS 1 TABLET BY MOUTH DAILY 1/8/07  Yes Sara Jordan MD   Calcium Carbonate-Vit D-Min (CALCIUM 1200 PO) Take 1 tablet by mouth daily   Yes Reported, Patient   Cholecalciferol (VITAMIN D) 2000 UNITS CAPS Take  by mouth daily.   Yes Reported, Patient   Diphenhydramine-APAP, sleep, (TYLENOL PM EXTRA STRENGTH PO) Take by mouth At Bedtime   Yes Reported, Patient   levothyroxine (SYNTHROID/LEVOTHROID) 125 MCG tablet TAKE ONE TABLET BY MOUTH ONE TIME DAILY 2/15/18  Yes Aliyah Aggarwal MD   losartan (COZAAR) 50 MG tablet Take 1 tablet (50 mg) by mouth daily 10/2/17  Yes Aliyah Aggarwal MD   omeprazole 20 MG tablet Take 20 mg by mouth daily   Yes Reported, Patient   propylene glycol (SYSTANE BALANCE) 0.6 % SOLN ophthalmic solution 1 drop   Yes Reported, Patient   ursodiol (ACTIGALL) 500 MG tablet Take 1 tablet (500 mg) by mouth 3 times daily 9/20/21  Yes Dereck Way MD       Allergies  Allergies   Allergen Reactions     Ace Inhibitors Cough     Amlodipine Besylate      Swellings       Dilaudid [Hydromorphone Hcl] Hives     Percocet [Oxycodone-Acetaminophen] Other (See Comments)     Severe dizziness         Review of systems  A 10-point review of systems was negative    Examination  /71 (BP Location: Right arm, Patient Position: Sitting,  "Cuff Size: Adult Regular)   Pulse 68   Temp 97.7  F (36.5  C) (Oral)   Resp 18   Ht 1.594 m (5' 2.76\")   Wt 98.4 kg (217 lb)   SpO2 95%   BMI 38.74 kg/m    Body mass index is 38.74 kg/m .    Gen- well, NAD, A+Ox3, normal color  Lym- no palpable LAD  CVS- RRR  RS- CTA  Abd- obese, SNT, no ascites or organomegaly on palpation or percussion, BS+  Extr- hands normal, no YUMI  Skin- no rash or jaundice  Neuro- no asterixis  Psych- normal mood    Laboratory  Lab Results   Component Value Date     08/04/2021     09/04/2020    POTASSIUM 4.1 08/04/2021    POTASSIUM 4.2 09/04/2020    CHLORIDE 105 08/04/2021    CHLORIDE 108 09/04/2020    CO2 27 08/04/2021    CO2 29 09/04/2020    BUN 17 08/04/2021    BUN 19 09/04/2020    CR 0.63 08/04/2021    CR 0.69 09/04/2020       Lab Results   Component Value Date    BILITOTAL 0.5 08/04/2021    BILITOTAL 0.6 09/04/2020    ALT 18 08/04/2021    ALT 18 09/04/2020    AST 18 08/04/2021    AST 17 09/04/2020    ALKPHOS 129 08/04/2021    ALKPHOS 139 09/04/2020       Lab Results   Component Value Date    ALBUMIN 3.4 08/04/2021    ALBUMIN 3.8 09/04/2020    PROTTOTAL 7.1 08/04/2021    PROTTOTAL 7.4 09/04/2020        Lab Results   Component Value Date    WBC 5.6 08/04/2021    WBC 7.4 09/04/2020    HGB 12.4 08/04/2021    HGB 11.6 09/04/2020    MCV 84 08/04/2021    MCV 84 09/04/2020     08/04/2021     09/04/2020       Lab Results   Component Value Date    INR 0.99 08/04/2021    INR 1.05 09/04/2020       Radiology  Nil recent    Assessment  76 year old female who presents for routine follow-up of PBC.  Liver function tests normal on ursodiol.  Will continue current management on current dose of ursodiol.    Plan  1.  Continue ursodiol 500mg PO TID- rx refilled  2.  Follow-up in 12 months    Dereck Aguilar MD  Hepatology  [facility]  "

## 2021-09-20 NOTE — NURSING NOTE
"Chief Complaint   Patient presents with     RECHECK     PBC       Vital signs:  Temp: 97.7  F (36.5  C) Temp src: Oral BP: 117/71 Pulse: 68   Resp: 18 SpO2: 95 %     Height: 159.4 cm (5' 2.76\") Weight: 98.4 kg (217 lb)  Estimated body mass index is 38.74 kg/m  as calculated from the following:    Height as of this encounter: 1.594 m (5' 2.76\").    Weight as of this encounter: 98.4 kg (217 lb).        Flower Copeland, [facility]  9/20/2021 11:28 AM      "

## 2021-09-20 NOTE — LETTER
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  No new medications, ER visits or hospital admissions since last visit.  Patient is scheduled for left knee replacement at [facility] next week for osteoarthritis.    Patient is well today.  Her knee is a little more achy today compared to recent days.  She has no symptoms related to liver disease.    Patient denies itch, jaundice, lower extremity edema, abdominal distension, lethargy or confusion.    Patient denies melena, hematemesis or hematochezia.    Patient denies fevers, sweats or chills.  Patient is vaccinated again COVID.    Weight stable.  Appetite is good.    Patient rarely drinks alcohol.      Medical hx Surgical hx   Past Medical History:   Diagnosis Date     Dyslipidemia      Eczema      Need for prophylactic hormone replacement therapy (postmenopausal)      Obesity      Unspecified essential hypertension      Unspecified hypothyroidism       Past Surgical History:   Procedure Laterality Date     APPENDECTOMY       BREAST BIOPSY, RT/LT Right 1970     COLPORRHAPHY ANTERIOR  12/6/2010    COLPORRHAPHY ANTERIOR performed by MAY RIVERA at [facility]     CYSTOCELE REPAIR  2007     HYSTERECTOMY, JOSEE  1978    Hysterectomy     INJECT EPIDURAL LUMBAR  10/19/2011    Procedure:INJECT EPIDURAL LUMBAR; DESHAWN-; Surgeon:HERMILA ANESTHESIA PROVIDER; Location:[facility]     INJECT JOINT SACROILIAC  2/7/2011    INJECT JOINT SACROILIAC performed by GENERIC ANESTHESIA PROVIDER at [facility] OR     INJECT JOINT SACROILIAC  4/25/2012    Procedure:INJECT JOINT SACROILIAC; DESHAWN SI Joint --; Surgeon:GENERIC ANESTHESIA PROVIDER; Location:[facility]     PHACOEMULSIFICATION WITH STANDARD INTRAOCULAR LENS IMPLANT  5/26/2011    Procedure:PHACOEMULSIFICATION WITH STANDARD INTRAOCULAR LENS IMPLANT; Surgeon:JOSÉ MANUEL DORSEY; Location:[facility]     PHACOEMULSIFICATION WITH STANDARD INTRAOCULAR LENS IMPLANT  6/9/2011    Procedure:PHACOEMULSIFICATION WITH STANDARD INTRAOCULAR LENS IMPLANT; Surgeon:JOSÉ MANUEL DORSEY; Location:[facility]     RECTOCELE REPAIR  2007     SURGICAL HISTORY OF -       bilateral foot surgery; subtalar joint fusion; bunion     SURGICAL HISTORY OF -   2009    Posterior colporrhaphy w/ posterior Pro-Lift     TONSILLECTOMY            Medications  Prior to Admission medications    Medication Sig Start Date End Date Taking? Authorizing Provider   ASPIRIN 81 MG OR TABS 1 TABLET BY MOUTH DAILY 1/8/07  Yes Sara Jordan MD   Calcium Carbonate-Vit D-Min (CALCIUM 1200 PO) Take 1 tablet by mouth daily   Yes Reported, Patient   Cholecalciferol (VITAMIN D) 2000 UNITS CAPS Take  by mouth daily.   Yes Reported, Patient   Diphenhydramine-APAP, sleep, (TYLENOL PM EXTRA STRENGTH PO) Take by mouth At Bedtime   Yes Reported, Patient   levothyroxine (SYNTHROID/LEVOTHROID) 125 MCG tablet TAKE ONE TABLET BY MOUTH ONE TIME DAILY 2/15/18  Yes Aliyah Aggarwal MD   losartan (COZAAR) 50 MG tablet Take 1 tablet (50 mg) by mouth daily 10/2/17  Yes Aliyah Aggarwal MD   omeprazole 20 MG tablet Take 20 mg by mouth daily   Yes Reported, Patient   propylene glycol (SYSTANE BALANCE) 0.6 % SOLN ophthalmic solution 1 drop   Yes Reported, Patient   ursodiol (ACTIGALL) 500 MG tablet Take 1 tablet (500 mg) by mouth 3 times daily 9/20/21  Yes Dereck Way MD       Allergies  Allergies   Allergen Reactions     Ace Inhibitors Cough     Amlodipine Besylate      Swellings       Dilaudid [Hydromorphone Hcl] Hives     Percocet [Oxycodone-Acetaminophen] Other (See Comments)     Severe dizziness         Review of systems  A 10-point review of systems was  "negative    Examination  /71 (BP Location: Right arm, Patient Position: Sitting, Cuff Size: Adult Regular)   Pulse 68   Temp 97.7  F (36.5  C) (Oral)   Resp 18   Ht 1.594 m (5' 2.76\")   Wt 98.4 kg (217 lb)   SpO2 95%   BMI 38.74 kg/m    Body mass index is 38.74 kg/m .    Gen- well, NAD, A+Ox3, normal color  Lym- no palpable LAD  CVS- RRR  RS- CTA  Abd- obese, SNT, no ascites or organomegaly on palpation or percussion, BS+  Extr- hands normal, no YUMI  Skin- no rash or jaundice  Neuro- no asterixis  Psych- normal mood    Laboratory  Lab Results   Component Value Date     08/04/2021     09/04/2020    POTASSIUM 4.1 08/04/2021    POTASSIUM 4.2 09/04/2020    CHLORIDE 105 08/04/2021    CHLORIDE 108 09/04/2020    CO2 27 08/04/2021    CO2 29 09/04/2020    BUN 17 08/04/2021    BUN 19 09/04/2020    CR 0.63 08/04/2021    CR 0.69 09/04/2020       Lab Results   Component Value Date    BILITOTAL 0.5 08/04/2021    BILITOTAL 0.6 09/04/2020    ALT 18 08/04/2021    ALT 18 09/04/2020    AST 18 08/04/2021    AST 17 09/04/2020    ALKPHOS 129 08/04/2021    ALKPHOS 139 09/04/2020       Lab Results   Component Value Date    ALBUMIN 3.4 08/04/2021    ALBUMIN 3.8 09/04/2020    PROTTOTAL 7.1 08/04/2021    PROTTOTAL 7.4 09/04/2020        Lab Results   Component Value Date    WBC 5.6 08/04/2021    WBC 7.4 09/04/2020    HGB 12.4 08/04/2021    HGB 11.6 09/04/2020    MCV 84 08/04/2021    MCV 84 09/04/2020     08/04/2021     09/04/2020       Lab Results   Component Value Date    INR 0.99 08/04/2021    INR 1.05 09/04/2020       Radiology  Nil recent    Assessment  76 year old female who presents for routine follow-up of PBC.  Liver function tests normal on ursodiol.  Will continue current management on current dose of ursodiol.    Plan  1.  Continue ursodiol 500mg PO TID- rx refilled  2.  Follow-up in 12 months    Dereck Aguilar MD  Hepatology  [facility]        Dereck Aguilar MD  "

## 2021-09-22 ENCOUNTER — TRANSCRIBE ORDERS (OUTPATIENT)
Dept: OTHER | Age: 76
End: 2021-09-22

## 2021-09-22 DIAGNOSIS — M25.569 KNEE PAIN: Primary | ICD-10-CM

## 2021-09-22 DIAGNOSIS — M19.90 OSTEOARTHRITIS: ICD-10-CM

## 2021-09-28 ENCOUNTER — HOSPITAL ENCOUNTER (OUTPATIENT)
Facility: CLINIC | Age: 76
End: 2021-09-28
Attending: ORTHOPAEDIC SURGERY | Admitting: ORTHOPAEDIC SURGERY
Payer: MEDICARE

## 2021-09-28 DIAGNOSIS — Z11.59 ENCOUNTER FOR SCREENING FOR OTHER VIRAL DISEASES: ICD-10-CM

## 2021-09-29 ENCOUNTER — ANESTHESIA EVENT (OUTPATIENT)
Dept: SURGERY | Facility: CLINIC | Age: 76
End: 2021-09-29
Payer: MEDICARE

## 2021-09-29 RX ORDER — LIDOCAINE 40 MG/G
CREAM TOPICAL
Status: CANCELLED | OUTPATIENT
Start: 2021-09-29

## 2021-09-29 RX ORDER — ACETAMINOPHEN 325 MG/1
975 TABLET ORAL ONCE
Status: CANCELLED | OUTPATIENT
Start: 2021-09-29 | End: 2021-09-29

## 2021-09-29 RX ORDER — SODIUM CHLORIDE, SODIUM LACTATE, POTASSIUM CHLORIDE, CALCIUM CHLORIDE 600; 310; 30; 20 MG/100ML; MG/100ML; MG/100ML; MG/100ML
INJECTION, SOLUTION INTRAVENOUS CONTINUOUS
Status: CANCELLED | OUTPATIENT
Start: 2021-09-29

## 2021-09-29 RX ORDER — MAGNESIUM SULFATE HEPTAHYDRATE 40 MG/ML
2 INJECTION, SOLUTION INTRAVENOUS ONCE
Status: CANCELLED | OUTPATIENT
Start: 2021-09-29 | End: 2021-09-29

## 2021-09-29 RX ORDER — GABAPENTIN 100 MG/1
100 CAPSULE ORAL
Status: CANCELLED | OUTPATIENT
Start: 2021-09-29

## 2021-09-29 ASSESSMENT — LIFESTYLE VARIABLES: TOBACCO_USE: 1

## 2021-09-29 NOTE — ANESTHESIA PREPROCEDURE EVALUATION
Anesthesia Pre-Procedure Evaluation    Patient: Gail Dewitt   MRN: [id number] : 1945        Preoperative Diagnosis: Osteoarthritis of left knee, unspecified osteoarthritis type [M17.12]   Procedure : Procedure(s):  Total Knee Arthroplasty     Past Medical History:   Diagnosis Date     Dyslipidemia      Eczema      Need for prophylactic hormone replacement therapy (postmenopausal)      Obesity      Unspecified essential hypertension      Unspecified hypothyroidism       Past Surgical History:   Procedure Laterality Date     APPENDECTOMY       BREAST BIOPSY, RT/LT Right 1970     COLPORRHAPHY ANTERIOR  2010    COLPORRHAPHY ANTERIOR performed by [facility] RIVERA at [facility]     CYSTOCELE REPAIR       HYSTERECTOMY, JOSEE  1978    Hysterectomy     INJECT EPIDURAL LUMBAR  10/19/2011    Procedure:INJECT EPIDURAL LUMBAR; DESHAWN-; Surgeon:GENERIC ANESTHESIA PROVIDER; Location:[facility]     INJECT JOINT SACROILIAC  2011    INJECT JOINT SACROILIAC performed by GENERIC ANESTHESIA PROVIDER at [facility]     INJECT JOINT SACROILIAC  2012    Procedure:INJECT JOINT SACROILIAC; DESHAWN SI Joint --; Surgeon:GENERIC ANESTHESIA PROVIDER; Location:[facility]     PHACOEMULSIFICATION WITH STANDARD INTRAOCULAR LENS IMPLANT  2011    Procedure:PHACOEMULSIFICATION WITH STANDARD INTRAOCULAR LENS IMPLANT; Surgeon:JOSÉ MANUEL DORSEY; Location:[facility]     PHACOEMULSIFICATION WITH STANDARD INTRAOCULAR LENS IMPLANT  2011    Procedure:PHACOEMULSIFICATION WITH STANDARD INTRAOCULAR LENS IMPLANT; Surgeon:JOSÉ MANUEL DORSEY; Location:[facility]     RECTOCELE REPAIR       SURGICAL HISTORY OF -       bilateral foot surgery; subtalar joint fusion; bunion     SURGICAL HISTORY OF -       Posterior colporrhaphy w/ posterior Pro-Lift     TONSILLECTOMY        Allergies   Allergen Reactions     Ace Inhibitors Cough     Amlodipine Besylate      Swellings       Dilaudid [Hydromorphone Hcl] Hives     Percocet [Oxycodone-Acetaminophen]  Other (See Comments)     Severe dizziness        Social History     Tobacco Use     Smoking status: Former Smoker     Years: 20.00     Quit date: 1985     Years since quittin.0     Smokeless tobacco: Never Used   Substance Use Topics     Alcohol use: Yes     Comment: 1 Q 1-2 weeks      Wt Readings from Last 1 Encounters:   21 98.4 kg (217 lb)        Anesthesia Evaluation   Pt has had prior anesthetic. Type: General and MAC.    History of anesthetic complications   Sensitivity to narcotics yes, Dilaudid and percocet.    ROS/MED HX  ENT/Pulmonary:     (+) tobacco use, Past use,     Neurologic:       Cardiovascular:     (+) Dyslipidemia hypertension-----Previous cardiac testing   Echo: Date: Results:    Stress Test: Date: Results:    ECG Reviewed: Date: 11/29/10 Results:  Sinus Rhythm   WITHIN NORMAL LIMITS  Cath: Date: Results:      METS/Exercise Tolerance: >4 METS    Hematologic:       Musculoskeletal:       GI/Hepatic:     (+) GERD,     Renal/Genitourinary:       Endo:     (+) thyroid problem, hypothyroidism, Obesity,     Psychiatric/Substance Use:       Infectious Disease:       Malignancy:       Other:      (+) , H/O Chronic Pain,           OUTSIDE LABS:  CBC:   Lab Results   Component Value Date    WBC 5.6 2021    WBC 7.4 2020    HGB 12.4 2021    HGB 11.6 (L) 2020    HCT 37.5 2021    HCT 36.3 2020     2021     2020     BMP:   Lab Results   Component Value Date     2021     2020    POTASSIUM 4.1 2021    POTASSIUM 4.2 2020    CHLORIDE 105 2021    CHLORIDE 108 2020    CO2 27 2021    CO2 29 2020    BUN 17 2021    BUN 19 2020    CR 0.63 2021    CR 0.69 2020    GLC 94 2021    GLC 92 2020     COAGS:   Lab Results   Component Value Date    INR 0.99 2021     POC: No results found for: BGM, HCG, HCGS  HEPATIC:   Lab Results   Component Value Date     ALBUMIN 3.4 08/04/2021    PROTTOTAL 7.1 08/04/2021    ALT 18 08/04/2021    AST 18 08/04/2021     (H) 11/21/2017    ALKPHOS 129 08/04/2021    BILITOTAL 0.5 08/04/2021     OTHER:   Lab Results   Component Value Date    A1C 5.9 05/20/2014    CELINA 8.7 08/04/2021    LIPASE 172 07/10/2016    AMYLASE 82 12/09/2009    TSH 2.50 08/04/2021    T4 1.33 12/19/2017    CRP 4.5 03/28/2016    SED 24 03/28/2016               EMILY Dickens CRNA

## 2021-09-30 RX ORDER — CEFAZOLIN SODIUM 2 G/100ML
2 INJECTION, SOLUTION INTRAVENOUS SEE ADMIN INSTRUCTIONS
Status: CANCELLED | OUTPATIENT
Start: 2021-09-30

## 2021-09-30 RX ORDER — CEFAZOLIN SODIUM 2 G/100ML
2 INJECTION, SOLUTION INTRAVENOUS
Status: CANCELLED | OUTPATIENT
Start: 2021-09-30

## 2021-09-30 RX ORDER — ACETAMINOPHEN 325 MG/1
975 TABLET ORAL ONCE
Status: CANCELLED | OUTPATIENT
Start: 2021-09-30 | End: 2021-09-30

## 2021-09-30 RX ORDER — TRANEXAMIC ACID 650 MG/1
1950 TABLET ORAL ONCE
Status: CANCELLED | OUTPATIENT
Start: 2021-09-30 | End: 2021-09-30

## 2021-10-01 ENCOUNTER — PATIENT OUTREACH (OUTPATIENT)
Dept: CARE COORDINATION | Facility: CLINIC | Age: 76
End: 2021-10-01

## 2021-10-01 NOTE — PROGRESS NOTES
Clinic Care Coordination Contact    [facility] contacted patient to discuss supports at home and potential post discharge plans.  answered. Patient is not available at this time. He reports that she will be available this afternoon. [facility] will try calling patient later today.    Genaro Ventura, [facility]  Primary Care Clinic- Social Work Care Coordinator  [facility] and [facility]  Ph: [phone]  10/1/2021 12:48 PM

## 2021-10-03 ENCOUNTER — LAB (OUTPATIENT)
Dept: FAMILY MEDICINE | Facility: CLINIC | Age: 76
End: 2021-10-03
Attending: ORTHOPAEDIC SURGERY
Payer: MEDICARE

## 2021-10-03 DIAGNOSIS — Z11.59 ENCOUNTER FOR SCREENING FOR OTHER VIRAL DISEASES: ICD-10-CM

## 2021-10-03 PROCEDURE — U0003 INFECTIOUS AGENT DETECTION BY NUCLEIC ACID (DNA OR RNA); SEVERE ACUTE RESPIRATORY SYNDROME CORONAVIRUS 2 (SARS-COV-2) (CORONAVIRUS DISEASE [COVID-19]), AMPLIFIED PROBE TECHNIQUE, MAKING USE OF HIGH THROUGHPUT TECHNOLOGIES AS DESCRIBED BY CMS-2020-01-R: HCPCS

## 2021-10-03 PROCEDURE — U0005 INFEC AGEN DETEC AMPLI PROBE: HCPCS

## 2021-10-04 ENCOUNTER — PATIENT OUTREACH (OUTPATIENT)
Dept: NURSING | Facility: CLINIC | Age: 76
End: 2021-10-04
Payer: MEDICARE

## 2021-10-04 LAB — SARS-COV-2 RNA RESP QL NAA+PROBE: NEGATIVE

## 2021-10-04 NOTE — PROGRESS NOTES
Clinic Care Coordination Contact      [facility] contacted patient to inquire about personal supports and post-discharge plans. Introduced self and role. Patient stated that she lives in a townhome with her spouse. She will have no stairs to navigate. Patient reports having a walker, cane and shower chair at home. Her plan at discharge is to return home with 's support. Daughter will also be in town. Patient expressed significant concern about having to go to outpatient therapy right away after surgery. Would like to discuss home care further as a potential post-discharge plan. Discussed Medicare guidelines and homebound status criteria. If patient is able to get home care post discharge, she would be ok using [facility] care. She stated that she wants her daughter and  to be in the hospital at discharge. [facility] explained that visitor restrictions are in place and that she would only be allowed one visitor on day of discharge. Patient expressed frustration with this, as she reports that her  is on oxygen and is very hard of hearing, so she feels her  will need daughters support in listening to discharge instructions. [facility] apologized to patient. Validated her frustrations. Explained visitor policy again. No further questions    LAURA Cabrales  Primary Care Clinic- Social Work Care Coordinator  [facility] [place] and [facility]  Ph: [phone]  10/4/2021 12:21 PM

## 2021-10-06 ENCOUNTER — ANESTHESIA (OUTPATIENT)
Dept: SURGERY | Facility: CLINIC | Age: 76
End: 2021-10-06
Payer: MEDICARE

## 2021-11-15 DIAGNOSIS — Z11.59 ENCOUNTER FOR SCREENING FOR OTHER VIRAL DISEASES: ICD-10-CM

## 2021-12-06 RX ORDER — LEVOTHYROXINE SODIUM 150 UG/1
150 TABLET ORAL DAILY
COMMUNITY

## 2021-12-06 NOTE — PROVIDER NOTIFICATION
Plans to discharge to home on POD 1 in the morning with her , Noah. Their daughter, Delmi, will be staying with them for 3 days after surgery.       12/06/21 1526   Discharge Planning   Patient/Family Anticipates Transition to home with family  (Outpatient PT appointments arranged at the [facility])   Living Arrangements   People in home spouse   Type of Residence Private Residence   Is your private residence a single family home or apartment? Single family home   Number of Stairs, Within Home, Primary none   Once home, are you able to live on one level? Yes   Which level? Main Level   Bathroom Shower/Tub Walk-in shower   Equipment Currently Used at Home shower chair;raised toilet seat;grab bar, tub/shower;grab bar, toilet;cane, straight  (has a walker to use at home after surgery)   Support System   Support Systems Spouse/Significant Other;Children  (, Noah, and daughter, Delmi)   Do you have someone available to stay with you one or two nights once you are home? Yes   Education   Patient attended total joint pre-op class/received pre-op teaching  email/phone call

## 2021-12-08 ENCOUNTER — TRANSFERRED RECORDS (OUTPATIENT)
Dept: HEALTH INFORMATION MANAGEMENT | Facility: CLINIC | Age: 76
End: 2021-12-08
Payer: MEDICARE

## 2021-12-08 LAB
CREATININE (EXTERNAL): 0.82 MG/DL (ref 0.57–1.11)
GFR ESTIMATED (EXTERNAL): >60 ML/MIN/1.73M2
GFR ESTIMATED (IF AFRICAN AMERICAN) (EXTERNAL): >60 ML/MIN/1.73M2
GLUCOSE (EXTERNAL): 85 MG/DL (ref 70–100)
POTASSIUM (EXTERNAL): 4.4 MMOL/L (ref 3.5–5.1)

## 2021-12-09 ENCOUNTER — LAB (OUTPATIENT)
Dept: LAB | Facility: CLINIC | Age: 76
End: 2021-12-09
Payer: MEDICARE

## 2021-12-09 DIAGNOSIS — Z11.59 ENCOUNTER FOR SCREENING FOR OTHER VIRAL DISEASES: ICD-10-CM

## 2021-12-09 PROCEDURE — U0005 INFEC AGEN DETEC AMPLI PROBE: HCPCS

## 2021-12-09 PROCEDURE — U0003 INFECTIOUS AGENT DETECTION BY NUCLEIC ACID (DNA OR RNA); SEVERE ACUTE RESPIRATORY SYNDROME CORONAVIRUS 2 (SARS-COV-2) (CORONAVIRUS DISEASE [COVID-19]), AMPLIFIED PROBE TECHNIQUE, MAKING USE OF HIGH THROUGHPUT TECHNOLOGIES AS DESCRIBED BY CMS-2020-01-R: HCPCS

## 2021-12-10 ENCOUNTER — ANESTHESIA EVENT (OUTPATIENT)
Dept: SURGERY | Facility: CLINIC | Age: 76
End: 2021-12-10
Payer: MEDICARE

## 2021-12-10 LAB — SARS-COV-2 RNA RESP QL NAA+PROBE: NEGATIVE

## 2021-12-13 ENCOUNTER — ANESTHESIA (OUTPATIENT)
Dept: SURGERY | Facility: CLINIC | Age: 76
End: 2021-12-13
Payer: MEDICARE

## 2021-12-13 ENCOUNTER — APPOINTMENT (OUTPATIENT)
Dept: PHYSICAL THERAPY | Facility: CLINIC | Age: 76
End: 2021-12-13
Attending: ORTHOPAEDIC SURGERY
Payer: MEDICARE

## 2021-12-13 ENCOUNTER — APPOINTMENT (OUTPATIENT)
Dept: RADIOLOGY | Facility: CLINIC | Age: 76
End: 2021-12-13
Attending: ORTHOPAEDIC SURGERY
Payer: MEDICARE

## 2021-12-13 ENCOUNTER — HOSPITAL ENCOUNTER (OUTPATIENT)
Facility: CLINIC | Age: 76
Discharge: HOME | End: 2021-12-15
Attending: ORTHOPAEDIC SURGERY | Admitting: ORTHOPAEDIC SURGERY
Payer: MEDICARE

## 2021-12-13 DIAGNOSIS — Z96.652 HISTORY OF TOTAL KNEE REPLACEMENT, LEFT: Primary | ICD-10-CM

## 2021-12-13 PROBLEM — M17.12 LEFT KNEE DJD: Status: ACTIVE | Noted: 2021-12-13

## 2021-12-13 LAB
CREAT SERPL-MCNC: 0.65 MG/DL (ref 0.6–1.1)
ERYTHROCYTE [DISTWIDTH] IN BLOOD BY AUTOMATED COUNT: 14 % (ref 10–15)
GFR SERPL CREATININE-BSD FRML MDRD: 86 ML/MIN/1.73M2
HCT VFR BLD AUTO: 35.6 % (ref 35–47)
HGB BLD-MCNC: 11.5 G/DL (ref 11.7–15.7)
INR PPP: 1.04 (ref 0.85–1.15)
MCH RBC QN AUTO: 27.2 PG (ref 26.5–33)
MCHC RBC AUTO-ENTMCNC: 32.3 G/DL (ref 31.5–36.5)
MCV RBC AUTO: 84 FL (ref 78–100)
PLATELET # BLD AUTO: 224 10E3/UL (ref 150–450)
POTASSIUM BLD-SCNC: 4.1 MMOL/L (ref 3.5–5)
RBC # BLD AUTO: 4.23 10E6/UL (ref 3.8–5.2)
WBC # BLD AUTO: 6.1 10E3/UL (ref 4–11)

## 2021-12-13 PROCEDURE — 97530 THERAPEUTIC ACTIVITIES: CPT | Mod: GP | Performed by: PHYSICAL THERAPIST

## 2021-12-13 PROCEDURE — 250N000013 HC RX MED GY IP 250 OP 250 PS 637: Performed by: ORTHOPAEDIC SURGERY

## 2021-12-13 PROCEDURE — 250N000011 HC RX IP 250 OP 636: Performed by: NURSE ANESTHETIST, CERTIFIED REGISTERED

## 2021-12-13 PROCEDURE — 999N000141 HC STATISTIC PRE-PROCEDURE NURSING ASSESSMENT: Performed by: ORTHOPAEDIC SURGERY

## 2021-12-13 PROCEDURE — 250N000011 HC RX IP 250 OP 636: Performed by: ANESTHESIOLOGY

## 2021-12-13 PROCEDURE — 87081 CULTURE SCREEN ONLY: CPT | Performed by: ORTHOPAEDIC SURGERY

## 2021-12-13 PROCEDURE — 250N000009 HC RX 250: Performed by: ANESTHESIOLOGY

## 2021-12-13 PROCEDURE — 84132 ASSAY OF SERUM POTASSIUM: CPT | Performed by: PHYSICIAN ASSISTANT

## 2021-12-13 PROCEDURE — 258N000001 HC RX 258: Performed by: ORTHOPAEDIC SURGERY

## 2021-12-13 PROCEDURE — 258N000003 HC RX IP 258 OP 636: Performed by: ANESTHESIOLOGY

## 2021-12-13 PROCEDURE — 250N000013 HC RX MED GY IP 250 OP 250 PS 637: Performed by: PHYSICIAN ASSISTANT

## 2021-12-13 PROCEDURE — 258N000003 HC RX IP 258 OP 636: Performed by: ORTHOPAEDIC SURGERY

## 2021-12-13 PROCEDURE — 97116 GAIT TRAINING THERAPY: CPT | Mod: GP | Performed by: PHYSICAL THERAPIST

## 2021-12-13 PROCEDURE — 250N000011 HC RX IP 250 OP 636: Performed by: PHYSICIAN ASSISTANT

## 2021-12-13 PROCEDURE — 82565 ASSAY OF CREATININE: CPT | Performed by: PHYSICIAN ASSISTANT

## 2021-12-13 PROCEDURE — 97110 THERAPEUTIC EXERCISES: CPT | Mod: GP | Performed by: PHYSICAL THERAPIST

## 2021-12-13 PROCEDURE — 85014 HEMATOCRIT: CPT | Performed by: PHYSICIAN ASSISTANT

## 2021-12-13 PROCEDURE — 999N000065 XR KNEE PORT LEFT 1/2 VIEWS: Mod: LT

## 2021-12-13 PROCEDURE — 278N000051 HC OR IMPLANT GENERAL: Performed by: ORTHOPAEDIC SURGERY

## 2021-12-13 PROCEDURE — 250N000025 HC SEVOFLURANE, PER MIN: Performed by: ORTHOPAEDIC SURGERY

## 2021-12-13 PROCEDURE — 360N000077 HC SURGERY LEVEL 4, PER MIN: Performed by: ORTHOPAEDIC SURGERY

## 2021-12-13 PROCEDURE — 250N000013 HC RX MED GY IP 250 OP 250 PS 637: Performed by: HOSPITALIST

## 2021-12-13 PROCEDURE — 250N000009 HC RX 250: Performed by: NURSE ANESTHETIST, CERTIFIED REGISTERED

## 2021-12-13 PROCEDURE — 272N000001 HC OR GENERAL SUPPLY STERILE: Performed by: ORTHOPAEDIC SURGERY

## 2021-12-13 PROCEDURE — 36415 COLL VENOUS BLD VENIPUNCTURE: CPT | Performed by: PHYSICIAN ASSISTANT

## 2021-12-13 PROCEDURE — C1776 JOINT DEVICE (IMPLANTABLE): HCPCS | Performed by: ORTHOPAEDIC SURGERY

## 2021-12-13 PROCEDURE — 710N000010 HC RECOVERY PHASE 1, LEVEL 2, PER MIN: Performed by: ORTHOPAEDIC SURGERY

## 2021-12-13 PROCEDURE — 370N000017 HC ANESTHESIA TECHNICAL FEE, PER MIN: Performed by: ORTHOPAEDIC SURGERY

## 2021-12-13 PROCEDURE — 250N000011 HC RX IP 250 OP 636: Performed by: ORTHOPAEDIC SURGERY

## 2021-12-13 PROCEDURE — 85610 PROTHROMBIN TIME: CPT | Performed by: PHYSICIAN ASSISTANT

## 2021-12-13 PROCEDURE — 250N000009 HC RX 250: Performed by: PHYSICIAN ASSISTANT

## 2021-12-13 PROCEDURE — 250N000009 HC RX 250: Performed by: ORTHOPAEDIC SURGERY

## 2021-12-13 PROCEDURE — 97161 PT EVAL LOW COMPLEX 20 MIN: CPT | Mod: GP | Performed by: PHYSICAL THERAPIST

## 2021-12-13 DEVICE — PATELLA
Type: IMPLANTABLE DEVICE | Site: KNEE | Status: FUNCTIONAL
Brand: TRIATHLON

## 2021-12-13 DEVICE — TIBIAL BEARING INSERT - PS
Type: IMPLANTABLE DEVICE | Site: KNEE | Status: FUNCTIONAL
Brand: TRIATHLON

## 2021-12-13 DEVICE — PRIMARY TIBIAL BASEPLATE
Type: IMPLANTABLE DEVICE | Site: KNEE | Status: FUNCTIONAL
Brand: TRIATHLON

## 2021-12-13 DEVICE — POSTERIOR STABILIZED FEMORAL
Type: IMPLANTABLE DEVICE | Site: KNEE | Status: FUNCTIONAL
Brand: TRIATHLON

## 2021-12-13 DEVICE — BONE CEMENT RADIOPAQUE SIMPLEX HV FULL DOSE 6194-1-001: Type: IMPLANTABLE DEVICE | Site: KNEE | Status: FUNCTIONAL

## 2021-12-13 RX ORDER — OXYCODONE HYDROCHLORIDE 5 MG/1
5-10 TABLET ORAL
Qty: 30 TABLET | Refills: 0 | Status: SHIPPED | OUTPATIENT
Start: 2021-12-13 | End: 2021-12-14

## 2021-12-13 RX ORDER — NALOXONE HYDROCHLORIDE 0.4 MG/ML
0.2 INJECTION, SOLUTION INTRAMUSCULAR; INTRAVENOUS; SUBCUTANEOUS
Status: DISCONTINUED | OUTPATIENT
Start: 2021-12-13 | End: 2021-12-15 | Stop reason: HOSPADM

## 2021-12-13 RX ORDER — PROCHLORPERAZINE MALEATE 5 MG
5 TABLET ORAL EVERY 6 HOURS PRN
Status: DISCONTINUED | OUTPATIENT
Start: 2021-12-13 | End: 2021-12-15 | Stop reason: HOSPADM

## 2021-12-13 RX ORDER — ONDANSETRON 4 MG/1
4 TABLET, ORALLY DISINTEGRATING ORAL EVERY 6 HOURS PRN
Status: DISCONTINUED | OUTPATIENT
Start: 2021-12-13 | End: 2021-12-15 | Stop reason: HOSPADM

## 2021-12-13 RX ORDER — POLYETHYLENE GLYCOL 3350 17 G/17G
17 POWDER, FOR SOLUTION ORAL DAILY
Status: DISCONTINUED | OUTPATIENT
Start: 2021-12-14 | End: 2021-12-15 | Stop reason: HOSPADM

## 2021-12-13 RX ORDER — NALOXONE HYDROCHLORIDE 0.4 MG/ML
0.4 INJECTION, SOLUTION INTRAMUSCULAR; INTRAVENOUS; SUBCUTANEOUS
Status: DISCONTINUED | OUTPATIENT
Start: 2021-12-13 | End: 2021-12-15 | Stop reason: HOSPADM

## 2021-12-13 RX ORDER — AMOXICILLIN 250 MG
1 CAPSULE ORAL 2 TIMES DAILY
Status: DISCONTINUED | OUTPATIENT
Start: 2021-12-13 | End: 2021-12-15 | Stop reason: HOSPADM

## 2021-12-13 RX ORDER — LOSARTAN POTASSIUM 50 MG/1
50 TABLET ORAL ONCE
Status: COMPLETED | OUTPATIENT
Start: 2021-12-14 | End: 2021-12-14

## 2021-12-13 RX ORDER — CEFAZOLIN SODIUM 2 G/100ML
2 INJECTION, SOLUTION INTRAVENOUS SEE ADMIN INSTRUCTIONS
Status: DISCONTINUED | OUTPATIENT
Start: 2021-12-13 | End: 2021-12-13 | Stop reason: HOSPADM

## 2021-12-13 RX ORDER — KETAMINE HYDROCHLORIDE 10 MG/ML
INJECTION INTRAMUSCULAR; INTRAVENOUS PRN
Status: DISCONTINUED | OUTPATIENT
Start: 2021-12-13 | End: 2021-12-13

## 2021-12-13 RX ORDER — LIDOCAINE 40 MG/G
CREAM TOPICAL
Status: DISCONTINUED | OUTPATIENT
Start: 2021-12-13 | End: 2021-12-15 | Stop reason: HOSPADM

## 2021-12-13 RX ORDER — FENTANYL CITRATE 50 UG/ML
50 INJECTION, SOLUTION INTRAMUSCULAR; INTRAVENOUS
Status: CANCELLED | OUTPATIENT
Start: 2021-12-13

## 2021-12-13 RX ORDER — FENTANYL CITRATE 50 UG/ML
50 INJECTION, SOLUTION INTRAMUSCULAR; INTRAVENOUS EVERY 5 MIN PRN
Status: DISCONTINUED | OUTPATIENT
Start: 2021-12-13 | End: 2021-12-13 | Stop reason: HOSPADM

## 2021-12-13 RX ORDER — METHOCARBAMOL 500 MG/1
500 TABLET, FILM COATED ORAL EVERY 6 HOURS PRN
Status: DISCONTINUED | OUTPATIENT
Start: 2021-12-13 | End: 2021-12-15 | Stop reason: HOSPADM

## 2021-12-13 RX ORDER — HYDROCODONE BITARTRATE AND ACETAMINOPHEN 5; 325 MG/1; MG/1
1 TABLET ORAL EVERY 6 HOURS PRN
Status: DISCONTINUED | OUTPATIENT
Start: 2021-12-13 | End: 2021-12-13

## 2021-12-13 RX ORDER — URSODIOL 250 MG/1
500 TABLET, FILM COATED ORAL 3 TIMES DAILY
Status: DISCONTINUED | OUTPATIENT
Start: 2021-12-13 | End: 2021-12-15 | Stop reason: HOSPADM

## 2021-12-13 RX ORDER — KETOROLAC TROMETHAMINE 30 MG/ML
INJECTION, SOLUTION INTRAMUSCULAR; INTRAVENOUS PRN
Status: DISCONTINUED | OUTPATIENT
Start: 2021-12-13 | End: 2021-12-13

## 2021-12-13 RX ORDER — LOSARTAN POTASSIUM 50 MG/1
50 TABLET ORAL DAILY
Status: DISCONTINUED | OUTPATIENT
Start: 2021-12-14 | End: 2021-12-15 | Stop reason: HOSPADM

## 2021-12-13 RX ORDER — ONDANSETRON 4 MG/1
4 TABLET, ORALLY DISINTEGRATING ORAL EVERY 30 MIN PRN
Status: DISCONTINUED | OUTPATIENT
Start: 2021-12-13 | End: 2021-12-13 | Stop reason: HOSPADM

## 2021-12-13 RX ORDER — ONDANSETRON 2 MG/ML
4 INJECTION INTRAMUSCULAR; INTRAVENOUS EVERY 6 HOURS PRN
Status: DISCONTINUED | OUTPATIENT
Start: 2021-12-13 | End: 2021-12-15 | Stop reason: HOSPADM

## 2021-12-13 RX ORDER — OXYCODONE HYDROCHLORIDE 5 MG/1
10 TABLET ORAL EVERY 4 HOURS PRN
Status: DISCONTINUED | OUTPATIENT
Start: 2021-12-13 | End: 2021-12-13

## 2021-12-13 RX ORDER — DEXAMETHASONE SODIUM PHOSPHATE 4 MG/ML
INJECTION, SOLUTION INTRA-ARTICULAR; INTRALESIONAL; INTRAMUSCULAR; INTRAVENOUS; SOFT TISSUE PRN
Status: DISCONTINUED | OUTPATIENT
Start: 2021-12-13 | End: 2021-12-13

## 2021-12-13 RX ORDER — HYDROXYZINE HYDROCHLORIDE 10 MG/1
10 TABLET, FILM COATED ORAL EVERY 6 HOURS PRN
Status: DISCONTINUED | OUTPATIENT
Start: 2021-12-13 | End: 2021-12-15 | Stop reason: HOSPADM

## 2021-12-13 RX ORDER — LIDOCAINE HYDROCHLORIDE 10 MG/ML
INJECTION, SOLUTION INFILTRATION; PERINEURAL PRN
Status: DISCONTINUED | OUTPATIENT
Start: 2021-12-13 | End: 2021-12-13

## 2021-12-13 RX ORDER — HYDROMORPHONE HCL IN WATER/PF 6 MG/30 ML
0.2 PATIENT CONTROLLED ANALGESIA SYRINGE INTRAVENOUS
Status: DISCONTINUED | OUTPATIENT
Start: 2021-12-13 | End: 2021-12-13

## 2021-12-13 RX ORDER — BUPIVACAINE HYDROCHLORIDE AND EPINEPHRINE 5; 5 MG/ML; UG/ML
INJECTION, SOLUTION PERINEURAL
Status: COMPLETED | OUTPATIENT
Start: 2021-12-13 | End: 2021-12-13

## 2021-12-13 RX ORDER — PROPOFOL 10 MG/ML
INJECTION, EMULSION INTRAVENOUS CONTINUOUS PRN
Status: DISCONTINUED | OUTPATIENT
Start: 2021-12-13 | End: 2021-12-13

## 2021-12-13 RX ORDER — SODIUM CHLORIDE, SODIUM LACTATE, POTASSIUM CHLORIDE, CALCIUM CHLORIDE 600; 310; 30; 20 MG/100ML; MG/100ML; MG/100ML; MG/100ML
INJECTION, SOLUTION INTRAVENOUS CONTINUOUS
Status: DISCONTINUED | OUTPATIENT
Start: 2021-12-13 | End: 2021-12-14

## 2021-12-13 RX ORDER — SODIUM CHLORIDE, SODIUM LACTATE, POTASSIUM CHLORIDE, CALCIUM CHLORIDE 600; 310; 30; 20 MG/100ML; MG/100ML; MG/100ML; MG/100ML
INJECTION, SOLUTION INTRAVENOUS CONTINUOUS
Status: DISCONTINUED | OUTPATIENT
Start: 2021-12-13 | End: 2021-12-13 | Stop reason: HOSPADM

## 2021-12-13 RX ORDER — FAMOTIDINE 20 MG/1
20 TABLET, FILM COATED ORAL 2 TIMES DAILY
Status: DISCONTINUED | OUTPATIENT
Start: 2021-12-13 | End: 2021-12-15 | Stop reason: HOSPADM

## 2021-12-13 RX ORDER — FENTANYL CITRATE 50 UG/ML
INJECTION, SOLUTION INTRAMUSCULAR; INTRAVENOUS PRN
Status: DISCONTINUED | OUTPATIENT
Start: 2021-12-13 | End: 2021-12-13

## 2021-12-13 RX ORDER — ACETAMINOPHEN 325 MG/1
975 TABLET ORAL ONCE
Status: COMPLETED | OUTPATIENT
Start: 2021-12-13 | End: 2021-12-13

## 2021-12-13 RX ORDER — ACETAMINOPHEN 325 MG/1
975 TABLET ORAL EVERY 8 HOURS
Status: DISCONTINUED | OUTPATIENT
Start: 2021-12-13 | End: 2021-12-13

## 2021-12-13 RX ORDER — BISACODYL 10 MG
10 SUPPOSITORY, RECTAL RECTAL DAILY PRN
Status: DISCONTINUED | OUTPATIENT
Start: 2021-12-13 | End: 2021-12-15 | Stop reason: HOSPADM

## 2021-12-13 RX ORDER — ONDANSETRON 2 MG/ML
INJECTION INTRAMUSCULAR; INTRAVENOUS PRN
Status: DISCONTINUED | OUTPATIENT
Start: 2021-12-13 | End: 2021-12-13

## 2021-12-13 RX ORDER — AMOXICILLIN 250 MG
1-2 CAPSULE ORAL 2 TIMES DAILY
Qty: 30 TABLET | Refills: 0 | Status: ON HOLD
Start: 2021-12-13 | End: 2022-04-16

## 2021-12-13 RX ORDER — HYDROXYZINE HYDROCHLORIDE 10 MG/1
10 TABLET, FILM COATED ORAL EVERY 6 HOURS PRN
Qty: 30 TABLET | Refills: 0 | Status: ON HOLD | OUTPATIENT
Start: 2021-12-13 | End: 2022-04-16

## 2021-12-13 RX ORDER — HYDROMORPHONE HCL IN WATER/PF 6 MG/30 ML
0.4 PATIENT CONTROLLED ANALGESIA SYRINGE INTRAVENOUS
Status: DISCONTINUED | OUTPATIENT
Start: 2021-12-13 | End: 2021-12-13

## 2021-12-13 RX ORDER — PROPOFOL 10 MG/ML
INJECTION, EMULSION INTRAVENOUS PRN
Status: DISCONTINUED | OUTPATIENT
Start: 2021-12-13 | End: 2021-12-13

## 2021-12-13 RX ORDER — PANTOPRAZOLE SODIUM 20 MG/1
40 TABLET, DELAYED RELEASE ORAL
Status: DISCONTINUED | OUTPATIENT
Start: 2021-12-14 | End: 2021-12-13

## 2021-12-13 RX ORDER — FENTANYL CITRATE 50 UG/ML
50 INJECTION, SOLUTION INTRAMUSCULAR; INTRAVENOUS
Status: DISCONTINUED | OUTPATIENT
Start: 2021-12-13 | End: 2021-12-13 | Stop reason: HOSPADM

## 2021-12-13 RX ORDER — PANTOPRAZOLE SODIUM 20 MG/1
40 TABLET, DELAYED RELEASE ORAL AT BEDTIME
Status: DISCONTINUED | OUTPATIENT
Start: 2021-12-13 | End: 2021-12-15 | Stop reason: HOSPADM

## 2021-12-13 RX ORDER — LEVOTHYROXINE SODIUM 75 UG/1
150 TABLET ORAL DAILY
Status: DISCONTINUED | OUTPATIENT
Start: 2021-12-14 | End: 2021-12-15 | Stop reason: HOSPADM

## 2021-12-13 RX ORDER — ONDANSETRON 2 MG/ML
4 INJECTION INTRAMUSCULAR; INTRAVENOUS EVERY 30 MIN PRN
Status: DISCONTINUED | OUTPATIENT
Start: 2021-12-13 | End: 2021-12-13 | Stop reason: HOSPADM

## 2021-12-13 RX ORDER — CEFAZOLIN SODIUM 2 G/100ML
2 INJECTION, SOLUTION INTRAVENOUS EVERY 8 HOURS
Status: COMPLETED | OUTPATIENT
Start: 2021-12-13 | End: 2021-12-13

## 2021-12-13 RX ORDER — CLONIDINE HYDROCHLORIDE 0.1 MG/1
0.1 TABLET ORAL EVERY 4 HOURS PRN
Status: DISCONTINUED | OUTPATIENT
Start: 2021-12-13 | End: 2021-12-15 | Stop reason: HOSPADM

## 2021-12-13 RX ORDER — LOSARTAN POTASSIUM 50 MG/1
50 TABLET ORAL DAILY
Status: DISCONTINUED | OUTPATIENT
Start: 2021-12-14 | End: 2021-12-13

## 2021-12-13 RX ORDER — MEPERIDINE HYDROCHLORIDE 25 MG/ML
12.5 INJECTION INTRAMUSCULAR; INTRAVENOUS; SUBCUTANEOUS
Status: DISCONTINUED | OUTPATIENT
Start: 2021-12-13 | End: 2021-12-13 | Stop reason: HOSPADM

## 2021-12-13 RX ORDER — HYDROCODONE BITARTRATE AND ACETAMINOPHEN 5; 325 MG/1; MG/1
1-2 TABLET ORAL EVERY 6 HOURS PRN
Status: DISCONTINUED | OUTPATIENT
Start: 2021-12-13 | End: 2021-12-14

## 2021-12-13 RX ORDER — CEFAZOLIN SODIUM 2 G/100ML
2 INJECTION, SOLUTION INTRAVENOUS
Status: COMPLETED | OUTPATIENT
Start: 2021-12-13 | End: 2021-12-13

## 2021-12-13 RX ORDER — LIDOCAINE 40 MG/G
CREAM TOPICAL
Status: DISCONTINUED | OUTPATIENT
Start: 2021-12-13 | End: 2021-12-13 | Stop reason: HOSPADM

## 2021-12-13 RX ORDER — TRANEXAMIC ACID 650 MG/1
1950 TABLET ORAL ONCE
Status: COMPLETED | OUTPATIENT
Start: 2021-12-13 | End: 2021-12-13

## 2021-12-13 RX ORDER — MAGNESIUM HYDROXIDE 1200 MG/15ML
LIQUID ORAL PRN
Status: DISCONTINUED | OUTPATIENT
Start: 2021-12-13 | End: 2021-12-13 | Stop reason: HOSPADM

## 2021-12-13 RX ORDER — NICOTINE POLACRILEX 4 MG/1
20 GUM, CHEWING ORAL EVERY EVENING
Status: DISCONTINUED | OUTPATIENT
Start: 2021-12-13 | End: 2021-12-13

## 2021-12-13 RX ORDER — ACETAMINOPHEN 325 MG/1
650 TABLET ORAL EVERY 4 HOURS PRN
Status: DISCONTINUED | OUTPATIENT
Start: 2021-12-16 | End: 2021-12-15 | Stop reason: HOSPADM

## 2021-12-13 RX ORDER — HALOPERIDOL 5 MG/ML
1 INJECTION INTRAMUSCULAR
Status: DISCONTINUED | OUTPATIENT
Start: 2021-12-13 | End: 2021-12-13 | Stop reason: HOSPADM

## 2021-12-13 RX ADMIN — FENTANYL CITRATE 50 MCG: 50 INJECTION INTRAMUSCULAR; INTRAVENOUS at 11:10

## 2021-12-13 RX ADMIN — FENTANYL CITRATE 100 MCG: 50 INJECTION, SOLUTION INTRAMUSCULAR; INTRAVENOUS at 07:59

## 2021-12-13 RX ADMIN — LIDOCAINE HYDROCHLORIDE 50 MG: 10 INJECTION, SOLUTION INFILTRATION; PERINEURAL at 07:41

## 2021-12-13 RX ADMIN — ONDANSETRON 4 MG: 2 INJECTION INTRAMUSCULAR; INTRAVENOUS at 08:57

## 2021-12-13 RX ADMIN — DEXAMETHASONE SODIUM PHOSPHATE 4 MG: 4 INJECTION, SOLUTION INTRA-ARTICULAR; INTRALESIONAL; INTRAMUSCULAR; INTRAVENOUS; SOFT TISSUE at 08:03

## 2021-12-13 RX ADMIN — FAMOTIDINE 20 MG: 20 TABLET, FILM COATED ORAL at 20:52

## 2021-12-13 RX ADMIN — PROPOFOL 20 MG: 10 INJECTION, EMULSION INTRAVENOUS at 07:43

## 2021-12-13 RX ADMIN — BUPIVACAINE HYDROCHLORIDE AND EPINEPHRINE BITARTRATE 15 ML: 5; .005 INJECTION, SOLUTION PERINEURAL at 07:29

## 2021-12-13 RX ADMIN — PROPOFOL 150 MCG/KG/MIN: 10 INJECTION, EMULSION INTRAVENOUS at 07:44

## 2021-12-13 RX ADMIN — SODIUM CHLORIDE, POTASSIUM CHLORIDE, SODIUM LACTATE AND CALCIUM CHLORIDE: 600; 310; 30; 20 INJECTION, SOLUTION INTRAVENOUS at 08:28

## 2021-12-13 RX ADMIN — HYDROCODONE BITARTRATE AND ACETAMINOPHEN 2 TABLET: 5; 325 TABLET ORAL at 22:54

## 2021-12-13 RX ADMIN — ACETAMINOPHEN 975 MG: 325 TABLET ORAL at 13:39

## 2021-12-13 RX ADMIN — KETAMINE HYDROCHLORIDE 50 MG: 10 INJECTION, SOLUTION INTRAMUSCULAR; INTRAVENOUS at 07:41

## 2021-12-13 RX ADMIN — FENTANYL CITRATE 50 MCG: 50 INJECTION INTRAMUSCULAR; INTRAVENOUS at 10:00

## 2021-12-13 RX ADMIN — PANTOPRAZOLE SODIUM 40 MG: 20 TABLET, DELAYED RELEASE ORAL at 21:03

## 2021-12-13 RX ADMIN — FENTANYL CITRATE 50 MCG: 50 INJECTION INTRAMUSCULAR; INTRAVENOUS at 10:24

## 2021-12-13 RX ADMIN — OXYCODONE HYDROCHLORIDE 10 MG: 5 TABLET ORAL at 12:28

## 2021-12-13 RX ADMIN — FENTANYL CITRATE 50 MCG: 50 INJECTION, SOLUTION INTRAMUSCULAR; INTRAVENOUS at 08:30

## 2021-12-13 RX ADMIN — FENTANYL CITRATE 50 MCG: 50 INJECTION INTRAMUSCULAR; INTRAVENOUS at 10:08

## 2021-12-13 RX ADMIN — KETOROLAC TROMETHAMINE 15 MG: 30 INJECTION, SOLUTION INTRAMUSCULAR at 09:23

## 2021-12-13 RX ADMIN — TRANEXAMIC ACID 1950 MG: 650 TABLET ORAL at 06:51

## 2021-12-13 RX ADMIN — FENTANYL CITRATE 50 MCG: 50 INJECTION INTRAMUSCULAR; INTRAVENOUS at 07:25

## 2021-12-13 RX ADMIN — SODIUM CHLORIDE, POTASSIUM CHLORIDE, SODIUM LACTATE AND CALCIUM CHLORIDE: 600; 310; 30; 20 INJECTION, SOLUTION INTRAVENOUS at 13:39

## 2021-12-13 RX ADMIN — SENNOSIDES AND DOCUSATE SODIUM 1 TABLET: 50; 8.6 TABLET ORAL at 20:52

## 2021-12-13 RX ADMIN — FENTANYL CITRATE 50 MCG: 50 INJECTION, SOLUTION INTRAMUSCULAR; INTRAVENOUS at 08:10

## 2021-12-13 RX ADMIN — ASPIRIN 325 MG: 325 TABLET, COATED ORAL at 14:43

## 2021-12-13 RX ADMIN — METHOCARBAMOL TABLETS 500 MG: 500 TABLET, COATED ORAL at 11:45

## 2021-12-13 RX ADMIN — PROPOFOL 70 MG: 10 INJECTION, EMULSION INTRAVENOUS at 08:00

## 2021-12-13 RX ADMIN — URSODIOL 500 MG: 250 TABLET, FILM COATED ORAL at 16:58

## 2021-12-13 RX ADMIN — ACETAMINOPHEN 975 MG: 325 TABLET ORAL at 06:51

## 2021-12-13 RX ADMIN — FENTANYL CITRATE 50 MCG: 50 INJECTION INTRAMUSCULAR; INTRAVENOUS at 11:26

## 2021-12-13 RX ADMIN — PROPOFOL 120 MG: 10 INJECTION, EMULSION INTRAVENOUS at 07:41

## 2021-12-13 RX ADMIN — CEFAZOLIN SODIUM 2 G: 2 INJECTION, SOLUTION INTRAVENOUS at 23:01

## 2021-12-13 RX ADMIN — SODIUM CHLORIDE, POTASSIUM CHLORIDE, SODIUM LACTATE AND CALCIUM CHLORIDE: 600; 310; 30; 20 INJECTION, SOLUTION INTRAVENOUS at 06:57

## 2021-12-13 RX ADMIN — CEFAZOLIN SODIUM 2 G: 2 INJECTION, SOLUTION INTRAVENOUS at 14:43

## 2021-12-13 RX ADMIN — HYDROCODONE BITARTRATE AND ACETAMINOPHEN 1 TABLET: 5; 325 TABLET ORAL at 16:48

## 2021-12-13 RX ADMIN — CEFAZOLIN SODIUM 2 G: 2 INJECTION, SOLUTION INTRAVENOUS at 07:35

## 2021-12-13 ASSESSMENT — MIFFLIN-ST. JEOR: SCORE: 1429.36

## 2021-12-13 NOTE — CONSULTS
Hospitalist Consultation Note  Reason for consult: postop med mgmt  Attending service: ortho    HPI:  77yo female admitted for left total knee arthroplasty.  Has medical history of essential hypertension, hypothyroidism, biliary cholangitis.    Her outpatient medications include losartan, Tylenol PM at bedtime, levothyroxine, omeprazole, ursodiol.    Vital signs today unremarkable for hypertension.  Labs today with normal creatinine, hemoglobin of 11.5.    Postop denies chest pain, dyspnea, abdominal pain, nausea, vomiting. No recent illnesses. No leg swelling. Denies hx of JEANNINE. Denies hx of postop complications in the past.     ROS:   Positive as stated above. Otherwise complete 10 point review of systems is negative.     Assessment:  Essential hypertension, typically on losartan  Hypothyroidism, on levothyroxine  Gastroesophageal reflux disease, on omeprazole  Biliary cholangitis, on ursodiol chronically  Status post total knee arthroplasty    Plan:  Continue home medications with hold parameters on losartan  Patient has listed allergy to Dilaudid and oxycodone  Changed pain medication to Norco, stopped tylenol for now to avoid excessive acetaminophen  Defer further postoperative pain control and DVT prophylaxis per orthopedic surgery  We will order oral clonidine to use as needed for systolic blood pressure greater than 180    Physical Exam:  Constitutional: no acute distress, comfortable, alert, pleasant  Eyes: no scleral icterus  ENT: moist oral mucosa  Resp: breathing comfortably at rest  CV: regular rate and rhythm, no pitting edema  Derm: warm, dry, not pale, no jaundice  Psych: appropriate affect    Allergies   Allergen Reactions     Dilaudid [Hydromorphone Hcl] Hives     Ace Inhibitors Cough     Amlodipine Besylate [Amlodipine] Swelling     Hydromorphone      Percocet [Oxycodone-Acetaminophen] Other (See Comments)     Severe dizziness       Past Surgical History:   Procedure Laterality Date      APPENDECTOMY       BREAST BIOPSY, RT/LT Right 1970     COLPORRHAPHY ANTERIOR  12/6/2010    COLPORRHAPHY ANTERIOR performed by MAY RIVERA at [facility]     CYSTOCELE REPAIR  2007     ECTOPIC PREGNANCY SURGERY  1969     EGD  2019     FOOT SURGERY Bilateral     Subtalar foot fusion and bunion surgery     HYSTERECTOMY, JOSEE  1978    Hysterectomy     INJECT EPIDURAL LUMBAR  10/19/2011    Procedure:INJECT EPIDURAL LUMBAR; DESHAWN-; Surgeon:GENERIC ANESTHESIA PROVIDER; Location:[facility]     INJECT JOINT SACROILIAC  2/7/2011    INJECT JOINT SACROILIAC performed by GENERIC ANESTHESIA PROVIDER at [facility]     INJECT JOINT SACROILIAC  4/25/2012    Procedure:INJECT JOINT SACROILIAC; DESHAWN SI Joint --; Surgeon:GENERIC ANESTHESIA PROVIDER; Location:[facility]     OOPHORECTOMY       PHACOEMULSIFICATION WITH STANDARD INTRAOCULAR LENS IMPLANT  5/26/2011    Procedure:PHACOEMULSIFICATION WITH STANDARD INTRAOCULAR LENS IMPLANT; Surgeon:JOSÉ MANUEL DORSEY; Location:[facility]     PHACOEMULSIFICATION WITH STANDARD INTRAOCULAR LENS IMPLANT  6/9/2011    Procedure:PHACOEMULSIFICATION WITH STANDARD INTRAOCULAR LENS IMPLANT; Surgeon:JOSÉ MANUEL DORSEY; Location:[facility]     RECTOCELE REPAIR  2007     TONSILLECTOMY       Family History   Problem Relation Age of Onset     Cardiovascular Brother      Hypertension Brother      Thyroid Disease Brother      Cardiovascular Paternal Grandmother      Heart Disease Paternal Grandmother         RIP MI age 71     Cardiovascular Paternal Grandfather      C.A.D. Mother      Hypertension Mother      Breast Cancer Mother      Alcohol/Drug Mother      Allergies Mother      Depression Mother      Osteoporosis Mother      Breast Cancer Maternal Grandmother      Breast Cancer Sister      Osteoporosis Sister      Liver Disease Sister         PBC     Thyroid Disease Father      Cardiovascular Maternal Grandfather      Heart Disease Maternal Grandfather         RIP MI, age 81     Neurologic Disorder Son      Obesity  Daughter      Gastrointestinal Disease Daughter         gallbladder surg     Thyroid Disease Son      Social History     Socioeconomic History     Marital status:      Spouse name: None     Number of children: None     Years of education: None     Highest education level: None   Occupational History     None   Tobacco Use     Smoking status: Former Smoker     Years: 20.00     Quit date: 1985     Years since quittin.2     Smokeless tobacco: Never Used   Vaping Use     Vaping Use: Never used   Substance and Sexual Activity     Alcohol use: Not Currently     Drug use: No     Sexual activity: Not Currently   Other Topics Concern      Service No     Blood Transfusions No     Caffeine Concern No     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 Yes     Comment: brother age 55, stents, pacemaker   Social History Narrative     None     Social Determinants of Health     Financial Resource Strain: Not on file   Food Insecurity: Not on file   Transportation Needs: Not on file   Physical Activity: Not on file   Stress: Not on file   Social Connections: Not on file   Intimate Partner Violence: Not on file   Housing Stability: Not on file     Medications Prior to Admission   Medication Sig Dispense Refill Last Dose     Calcium Carbonate-Vit D-Min (CALCIUM 1200 PO) Take 1 tablet by mouth daily   2021 at Unknown time     Cholecalciferol (VITAMIN D) 2000 UNITS CAPS Take 1,000 Units by mouth daily    2021 at Unknown time     diphenhydrAMINE-acetaminophen (TYLENOL PM EXTRA STRENGTH)  MG tablet Take 2 tablets by mouth At Bedtime    2021 at Unknown time     levothyroxine (SYNTHROID/LEVOTHROID) 150 MCG tablet Take 150 mcg by mouth  daily   12/12/2021 at Unknown time     losartan (COZAAR) 50 MG tablet Take 1 tablet (50 mg) by mouth daily 90 tablet 3 12/12/2021 at AM     omeprazole 20 MG tablet Take 20 mg by mouth every evening    12/12/2021 at Unknown time     propylene glycol (SYSTANE BALANCE) 0.6 % SOLN ophthalmic solution Place 1 drop into both eyes 2 times daily    12/13/2021 at AM, Pt brought     ursodiol (ACTIGALL) 500 MG tablet Take 1 tablet (500 mg) by mouth 3 times daily 270 tablet 3 12/12/2021 at PM     [DISCONTINUED] ASPIRIN 81 MG OR TABS Take 81 mg by mouth daily Stopped 12/2/21 before surgery 100 3 11/29/2021       [facility]  [facility]  Pager #: [phone]

## 2021-12-13 NOTE — ANESTHESIA PROCEDURE NOTES
Adductor canal Procedure Note    Pre-Procedure   Staff -        Anesthesiologist:  Adrian Bonilla MD       Performed By: anesthesiologist       Location: pre-op       Procedure Start/Stop Times: 12/13/2021 7:25 AM and 12/13/2021 7:29 AM       Pre-Anesthestic Checklist: patient identified, IV checked, site marked, risks and benefits discussed, informed consent, monitors and equipment checked, pre-op evaluation, at physician/surgeon's request and post-op pain management  Timeout:       Correct Patient: Yes        Correct Procedure: Yes        Correct Site: Yes        Correct Position: Yes        Correct Laterality: Yes        Site Marked: Yes  Procedure Documentation  Procedure: Adductor canal       Laterality: left       Patient Position: supine       Patient Prep/Sterile Barriers: sterile gloves, mask       Skin prep: Chloraprep       Needle Type: insulated       Needle Gauge: 20.        Needle Length (Inches): 4        Ultrasound guided       1. Ultrasound was used to identify targeted nerve, plexus, vascular marker, or fascial plane and place a needle adjacent to it in real-time.       2. Ultrasound was used to visualize the spread of anesthetic in close proximity to the above referenced structure.       3. A permanent image is entered into the patient's record.       4. The visualized anatomic structures appeared normal.       5. There were no apparent abnormal pathologic findings.    Assessment/Narrative         The placement was negative for: blood aspirated, painful injection and site bleeding       Paresthesias: No.     Bolus given via needle..        Secured via.        Insertion/Infusion Method: Single Shot       Complications: none       Injection made incrementally with aspirations every 5 mL.    Medication(s) Administered   Bupivacaine 0.5% w/ 1:200K Epi (Other), 15 mL  Medication Administration Time: 12/13/2021 7:29 AM

## 2021-12-13 NOTE — ANESTHESIA CARE TRANSFER NOTE
Patient: Gail Dewitt    Procedure: Procedure(s):  LEFT TOTAL KNEE ARTHROPLASTY       Diagnosis: Degenerative joint disease of left knee [M17.12]  Diagnosis Additional Information: No value filed.    Anesthesia Type:   General     Note:    Oropharynx: oral airway in place  Level of Consciousness: drowsy  Oxygen Supplementation: face mask  Level of Supplemental Oxygen (L/min / FiO2): 8  Independent Airway: airway patency not satisfactory and stable  Dentition: dentition unchanged  Vital Signs Stable: post-procedure vital signs reviewed and stable  Report to RN Given: handoff report given  Patient transferred to: PACU    Handoff Report: Identifed the Patient, Identified the Reponsible Provider, Reviewed the pertinent medical history, Discussed the surgical course, Reviewed Intra-OP anesthesia mangement and issues during anesthesia, Set expectations for post-procedure period and Allowed opportunity for questions and acknowledgement of understanding      Vitals:  Vitals Value Taken Time   /55 12/13/21 0932   Temp 97.9F    Pulse 109 12/13/21 0932   Resp 18 12/13/21 0932   SpO2 92 % 12/13/21 0932       Electronically Signed By: EMILY DE LUNA CRNA  December 13, 2021  9:36 AM

## 2021-12-13 NOTE — PROGRESS NOTES
[facility]      OUTPATIENT PHYSICAL THERAPY EVALUATION  PLAN OF TREATMENT FOR OUTPATIENT REHABILITATION  (COMPLETE FOR INITIAL CLAIMS ONLY)  Patient's Last Name, First Name, M.I.  YOB: 1945  Gail Dewitt                        Provider's Name  [facility] Medical Record No.  [id number]                               Onset Date:  12/13/21   Start of Care Date:  (P) 12/13/21      Type:     _X_PT   ___OT   ___SLP Medical Diagnosis:  (P) s/p L TKA                        PT Diagnosis:  (P) impaired functional mobility   Visits from SOC:  1   _________________________________________________________________________________  Plan of Treatment/Functional Goals    Planned Interventions: (P) bed mobility training,home exercise program,gait training,transfer training     Goals: See Physical Therapy Goals on Care Plan in [facility] electronic health record.    Therapy Frequency: (P) Daily  Predicted Duration of Therapy Intervention: (P) 2 days  _________________________________________________________________________________    I CERTIFY THE NEED FOR THESE SERVICES FURNISHED UNDER        THIS PLAN OF TREATMENT AND WHILE UNDER MY CARE     (Physician co-signature of this document indicates review and certification of the therapy plan).                Certification date from: (P) 12/13/21, Certification date to: (P) 01/13/22    Referring Physician: Quinton Cleary MD         Initial Assessment        See Physical Therapy evaluation dated (P) 12/13/21 in Epic electronic health record.

## 2021-12-13 NOTE — OP NOTE
Total Knee Arthroplasty Operative Note        PLAN:  Weight bearing status: Weight bearing as tolerated   Activity: Activity as tolerated  Patient may move about with assist as indicated or with supervision       Anticoagulation plan: 325 mg ASA every day 42 days                  Follow up plan                           Follow up in 2 week(s)        Name: Gail Dewitt    PCP: Aliyah Aggarwal    Procedure Date: 12/13/2021    Pre-operative diagnosis: Degenerative joint disease of left knee [M17.12]   Post-operative diagnosis: Same   Procedure: Total knee arthoplasty (Left)   Surgeon: Quinton Cleary MD     Assistant(s): Dennis Petit PA-C   Anesthesia: General endotracheal anesthesia   Estimated blood loss: Less than 50 ml   Drains: Hemovac   Specimens: None       Findings: See full dictated operative note for details   Complications: None       Comments: See dictated operative report for full details       Indications:    The patient has experienced progressive left knee pain despite use of  Analgesics, NSAID's, Injection therapy and physical therapy. Because of the failure of these therapies to control symptoms and limited walking, night pain and altered activities the patient has decided to move ahead with joint replacement surgery.    Procedure and Findings:    After being informed of risks, benefits, alternatives to the procedure, patient desired to proceed, brought to the operating suite where they were placed under spinal anesthetic. Patient received 2 grams of intravenous Ancef.  Dennis Petit PA-C was present for the entire length of the case for the purposes of proper patient positioning, surgical exposure, and patient safety. A time-out verification step was completed.    Examination of the joint surfaces demonstrated full thickness cartilage loss involving thelateral compartments of the left knee.    Attention was directed towards the patella. A midline incision, vastus splitting minimally  invasive approach was utilized. The patella was everted. It was measured at 35 mm. It was resected, remeasured and a 35 mm asymmetric patella was positioned. A protector plate was placed on the patella. Attention was directed toward the distal femur. IM guider renetta was placed. An 8 mm 5 degree distal femoral cut was completed. The IM guide renetta was then placed in the tibia. External guide renetta and tower were utilized and 9 mm button stylus was referenced off the lateral tibial plateau and cuts were completed. The tibia was sized to a 4. Attention was directed towards the distal femur. It was sized to a 4. The 4-in-1 cutting block was placed along the epicondylar axis. It was secured with 2 pins, anterior, posterior and chamfer cuts were completed. Soft tissue balancing was then carried out. Medial release was completed.After assessing range of motion and stability a flexion/extension gap mismatch was identified.  The decision was made to convert to a posterior stabilized device.  Femoral position was verified.  The PS cutting guide was fixated with 3 pins. PCL resected and trial components were tested.  Ultimately a 11 mm insert gave excellent range of motion and stability throughout the range of motion. Patella was noted to track symmetrically throughout the range to motion. The tibial tray rotation was marked, size was verified, it was secured with 2 pins and punched. Trial components were then removed. The cancellous surfaces were pulsatile lavaged. One batch of Palacos cement was mixed under vacuum. The tibia, femur and patella were sequentially cemented in place. The knee was held in extension with axial compression until the cement had hardened. The 11 mm insert was ultimately selected and secured Care was taken to remove any excess cement. Joint capsular injection with anesthetic solution was performed. Excellent range of motion and stability was demonstrated. A Hemovac drain was placed. The joint was copiously  irrigated. Joint capsules were closed with interrupted number 1 running Stratafix. Subcutaneous tissues were closed with 2-0 Vicryl and skin was closed with 3-0 running Stratifix for wound closure and Aquacell. A sterile dressing was applied. Patient tolerated the procedure well without complication and returned to the PAR in stable condition.      Quinton Cleary MD    Date: 12/13/2021 Time: 9:20 AM  ?    CONFIDENTIALITY NOTICE This message and any included attachments are from [facility] and are intended only for the addressee. The information contained in this message is confidential and may constitute inside or non-public information under international, federal, or state securities laws. Unauthorized forwarding, printing, copying, distribution, or use of such information is strictly prohibited and may be unlawful. If you are not the addressee, please promptly delete this message and notify the sender of the delivery error by e-mail.

## 2021-12-13 NOTE — ANESTHESIA PREPROCEDURE EVALUATION
Anesthesia Pre-Procedure Evaluation    Patient: Gail Dewitt   MRN: [id number] : 1945        Preoperative Diagnosis: Degenerative joint disease of left knee [M17.12]    Procedure : Procedure(s):  LEFT TOTAL KNEE ARTHROPLASTY          Past Medical History:   Diagnosis Date     Arthritis      Dyslipidemia      Eczema      Female bladder prolapse      Need for prophylactic hormone replacement therapy (postmenopausal)      Obesity      Unspecified essential hypertension      Unspecified hypothyroidism      Urge incontinence       Past Surgical History:   Procedure Laterality Date     APPENDECTOMY       BREAST BIOPSY, RT/LT Right 1970     COLPORRHAPHY ANTERIOR  2010    COLPORRHAPHY ANTERIOR performed by MAY RIVERA at [facility]     CYSTOCELE REPAIR       ECTOPIC PREGNANCY SURGERY  1969     EGD  2019     FOOT SURGERY Bilateral     Subtalar foot fusion and bunion surgery     HYSTERECTOMY, JOSEE      Hysterectomy     INJECT EPIDURAL LUMBAR  10/19/2011    Procedure:INJECT EPIDURAL LUMBAR; DESHAWN-; Surgeon:GENERIC ANESTHESIA PROVIDER; Location:[facility]     INJECT JOINT SACROILIAC  2011    INJECT JOINT SACROILIAC performed by GENERIC ANESTHESIA PROVIDER at [facility]     INJECT JOINT SACROILIAC  2012    Procedure:INJECT JOINT SACROILIAC; DESHAWN SI Joint --; Surgeon:GENERIC ANESTHESIA PROVIDER; Location:[facility]     OOPHORECTOMY       PHACOEMULSIFICATION WITH STANDARD INTRAOCULAR LENS IMPLANT  2011    Procedure:PHACOEMULSIFICATION WITH STANDARD INTRAOCULAR LENS IMPLANT; Surgeon:JOSÉ MANUEL DORSEY; Location:[facility]     PHACOEMULSIFICATION WITH STANDARD INTRAOCULAR LENS IMPLANT  2011    Procedure:PHACOEMULSIFICATION WITH STANDARD INTRAOCULAR LENS IMPLANT; Surgeon:JOSÉ MANUEL DORSEY; Location:[facility]     RECTOCELE REPAIR  2007     TONSILLECTOMY        Allergies   Allergen Reactions     Dilaudid [Hydromorphone Hcl] Hives     Ace Inhibitors Cough     Amlodipine Besylate [Amlodipine] Swelling      Hydromorphone      Percocet [Oxycodone-Acetaminophen] Other (See Comments)     Severe dizziness        Social History     Tobacco Use     Smoking status: Former Smoker     Years: 20.00     Quit date: 1985     Years since quittin.2     Smokeless tobacco: Never Used   Substance Use Topics     Alcohol use: Not Currently      Wt Readings from Last 1 Encounters:   21 97.5 kg (215 lb)        Anesthesia Evaluation   Pt has had prior anesthetic.     No history of anesthetic complications       ROS/MED HX  ENT/Pulmonary:  - neg pulmonary ROS     Neurologic:  - neg neurologic ROS     Cardiovascular:     (+) hypertension-----    METS/Exercise Tolerance:     Hematologic:  - neg hematologic  ROS     Musculoskeletal:   (+) arthritis,     GI/Hepatic: Comment: Primary sclerosis cholangitis     (+) GERD, Asymptomatic on medication, liver disease,     Renal/Genitourinary:  - neg Renal ROS     Endo:     (+) thyroid problem, Obesity,     Psychiatric/Substance Use:  - neg psychiatric ROS     Infectious Disease:  - neg infectious disease ROS     Malignancy:  - neg malignancy ROS     Other:  - neg other ROS          Physical Exam    Airway  airway exam normal      Mallampati: II   TM distance: > 3 FB   Neck ROM: full   Mouth opening: > 3 cm    Respiratory Devices and Support         Dental       (+) upper dentures and lower dentures      Cardiovascular   cardiovascular exam normal       Rhythm and rate: regular and normal     Pulmonary   pulmonary exam normal        breath sounds clear to auscultation           OUTSIDE LABS:  CBC:   Lab Results   Component Value Date    WBC 5.6 2021    WBC 7.4 2020    HGB 12.4 2021    HGB 11.6 (L) 2020    HCT 37.5 2021    HCT 36.3 2020     2021     2020     BMP:   Lab Results   Component Value Date     2021     2020    POTASSIUM 4.1 2021    POTASSIUM 4.2 2020    CHLORIDE 105 2021     CHLORIDE 108 09/04/2020    CO2 27 08/04/2021    CO2 29 09/04/2020    BUN 17 08/04/2021    BUN 19 09/04/2020    CR 0.63 08/04/2021    CR 0.69 09/04/2020    GLC 94 08/04/2021    GLC 92 09/04/2020     COAGS:   Lab Results   Component Value Date    INR 0.99 08/04/2021     POC: No results found for: BGM, HCG, HCGS  HEPATIC:   Lab Results   Component Value Date    ALBUMIN 3.4 08/04/2021    PROTTOTAL 7.1 08/04/2021    ALT 18 08/04/2021    AST 18 08/04/2021     (H) 11/21/2017    ALKPHOS 129 08/04/2021    BILITOTAL 0.5 08/04/2021     OTHER:   Lab Results   Component Value Date    A1C 5.9 05/20/2014    CELINA 8.7 08/04/2021    LIPASE 172 07/10/2016    AMYLASE 82 12/09/2009    TSH 2.50 08/04/2021    T4 1.33 12/19/2017    CRP 4.5 03/28/2016    SED 24 03/28/2016       Anesthesia Plan    ASA Status:  2   NPO Status:  NPO Appropriate    Anesthesia Type: General.     - Airway: LMA   Induction: Propofol.   Maintenance: TIVA.        Consents    Anesthesia Plan(s) and associated risks, benefits, and realistic alternatives discussed. Questions answered and patient/representative(s) expressed understanding.    - Discussed:     - Discussed with:  Patient         Postoperative Care    Pain management: IV analgesics, Oral pain medications, Multi-modal analgesia, Peripheral nerve block (Single Shot).   PONV prophylaxis: Ondansetron (or other 5HT-3), Dexamethasone or Solumedrol, Droperidol or Haldol, Background Propofol Infusion     Comments:                ROBERT ARTHUR MD

## 2021-12-13 NOTE — BRIEF OP NOTE
[facility]    Brief Operative Note    Pre-operative diagnosis: Degenerative joint disease of left knee [M17.12]  Post-operative diagnosis Same as pre-operative diagnosis    Procedure: Procedure(s):  LEFT TOTAL KNEE ARTHROPLASTY  Surgeon: Surgeon(s) and Role:     * Quinton Cleary MD - Primary     * Dennis Petit PA-C - Assisting  Anesthesia: Choice   Estimated Blood Loss: Less than 50 ml    Drains: Hemovac  Specimens: * No specimens in log *  Findings:   None.  Complications: None.  Implants:   Implant Name Type Inv. Item Serial No.  Lot No. LRB No. Used Action   BONE CEMENT RADIOPAQUE SIMPLEX HV FULL DOSE 6194-1-001 - [id number] Cement, Bone BONE CEMENT RADIOPAQUE SIMPLEX HV FULL DOSE 6194-1-001  [employer] [id number] Left 1 Implanted   IMP COMP PATELLA HOWM TRI 35 10MM 5551-L-350 - [id number] Total Joint Component/Insert IMP COMP PATELLA HOWM TRI 35 10MM 5551-L-350  [employer] [id number] Left 1 Implanted   IMP COMP FEM STRK TRIATHLN PS LT 4 5515-F-401 - [id number] Total Joint Component/Insert IMP COMP FEM STRK TRIATHLN PS LT 4 5515-F-401  [employer] [id number] Left 1 Implanted   IMP BASEPLATE TIBIAL HOWM TRI 4 5520-B-400 - [id number] Total Joint Component/Insert IMP BASEPLATE TIBIAL HOWM TRI 4 5520-B-400  [employer] [id number] Left 1 Implanted   IMP INSERT TIBIAL STRK TRI SIZE 4 11MM 1370-T-283-E - [id number] Total Joint Component/Insert IMP INSERT TIBIAL STRK TRI SIZE 4 11MM [id number]-Z-[id number]  [employer] [facility] [id number] Left 1 Implanted

## 2021-12-13 NOTE — ANESTHESIA PROCEDURE NOTES
Airway       Patient location during procedure: OR  Staff -        CRNA: Elicia Dos Santos APRN CRNA       Performed By: CRNA  Consent for Airway        Urgency: elective  Indications and Patient Condition       Indications for airway management: enrique-procedural        Final Airway Details       Final airway type: supraglottic airway    Supraglottic Airway Details        Type: LMA       LMA size: 4    Post intubation assessment        Placement verified by: capnometry, equal breath sounds and chest rise        Number of attempts at approach: 1       Number of other approaches attempted: 0       Ease of procedure: easy       Dentition: Intact

## 2021-12-13 NOTE — PROGRESS NOTES
12/13/21 0706   Quick Adds   Quick Adds Certification   Type of Visit Initial PT Evaluation   Living Environment   People in home spouse   Current Living Arrangements house   Home Accessibility no concerns   Living Environment Comments [place], daughter lives near by and son also nearby. Daughter will spend tomorrow with her mom and possibly more if neceessary.    Self-Care   Equipment Currently Used at Home cane, straight   Activity/Exercise/Self-Care Comment Has been using a cane outside the home due to pain from OA    General Information   Onset of Illness/Injury or Date of Surgery 12/13/21   Referring Physician Quinton Cleary   Patient/Family Therapy Goals Statement (PT) Return to home with assist from daughter    Pertinent History of Current Problem (include personal factors and/or comorbidities that impact the POC) L knee OA; s/p L TKA    Existing Precautions/Restrictions weight bearing   Weight-Bearing Status - LLE weight-bearing as tolerated   Cognition   Orientation Status (Cognition) oriented x 4   Affect/Mental Status (Cognition) WNL   Pain Assessment   Patient Currently in Pain Yes, see Vital Sign flowsheet   Posture    Posture Forward head position;Protracted shoulders;Kyphosis;Lordosis   Range of Motion (ROM)   ROM Comment L knee PROM; 3-90   Strength   Strength Comments WFL   Bed Mobility   Bed Mobility supine-sit   Supine-Sit [place] (Bed Mobility) minimum assist (75% patient effort)   Bed Mobility Limitations other (see comments)  (pain in affected LE )   Impairments Contributing to Impaired Bed Mobility pain   Assistive Device (Bed Mobility) bed rails   Transfers   Transfers sit-stand transfer   Transfer Safety Concerns Noted decreased weight-shifting ability   Impairments Contributing to Impaired Transfers pain   Sit-Stand Transfer   Sit-Stand [place] (Transfers) moderate assist (50% patient effort)   Assistive Device (Sit-Stand Transfers) walker, front-wheeled   Gait/Stairs  (Locomotion)   [place] Level (Gait) contact guard   Assistive Device (Gait) walker, front-wheeled   Distance in Feet (Required for LE Total Joints) 12,12   Pattern (Gait) step-to   Deviations/Abnormal Patterns (Gait) stride length decreased;antalgic   Maintains Weight-bearing Status (Gait) able to maintain   Balance   Balance Comments WFL   Clinical Impression   Criteria for Skilled Therapeutic Intervention yes, treatment indicated   PT Diagnosis (PT) impaired functional mobility   Influenced by the following impairments pain, ROM   Functional limitations due to impairments transfers, ambulation, bed mobility   Clinical Presentation Stable/Uncomplicated   Clinical Presentation Rationale presents as diagnosed   Clinical Decision Making (Complexity) low complexity   Therapy Frequency (PT) Daily   Predicted Duration of Therapy Intervention (days/wks) 2 days   Planned Therapy Interventions (PT) bed mobility training;home exercise program;gait training;transfer training   Risk & Benefits of therapy have been explained evaluation/treatment results reviewed;care plan/treatment goals reviewed;risks/benefits reviewed;current/potential barriers reviewed;participants voiced agreement with care plan;participants included;patient;daughter   PT Discharge Planning    PT Discharge Recommendation (DC Rec) home with assist;home with outpatient physical therapy   PT Rationale for DC Rec Patient has adequate assistance in the home and is anticipated to demonstrate adequate functional mobility to return there safely.    Therapy Certification   Start of care date 12/13/21   Certification date from 12/13/21   Certification date to 01/13/22   Medical Diagnosis s/p L TKA   Total Evaluation Time   Total Evaluation Time (Minutes) 10

## 2021-12-13 NOTE — PHARMACY-ADMISSION MEDICATION HISTORY
Pharmacy Note - Admission Medication History    Pertinent Provider Information:    ______________________________________________________________________    Prior To Admission (PTA) med list completed and updated in EMR.       PTA Med List   Medication Sig Last Dose     ASPIRIN 81 MG OR TABS Take 81 mg by mouth daily Stopped 12/2/21 before surgery 11/29/2021     Calcium Carbonate-Vit D-Min (CALCIUM 1200 PO) Take 1 tablet by mouth daily 12/12/2021 at Unknown time     Cholecalciferol (VITAMIN D) 2000 UNITS CAPS Take 1,000 Units by mouth daily  12/12/2021 at Unknown time     diphenhydrAMINE-acetaminophen (TYLENOL PM EXTRA STRENGTH)  MG tablet Take 2 tablets by mouth At Bedtime  12/12/2021 at Unknown time     levothyroxine (SYNTHROID/LEVOTHROID) 150 MCG tablet Take 150 mcg by mouth daily 12/12/2021 at Unknown time     losartan (COZAAR) 50 MG tablet Take 1 tablet (50 mg) by mouth daily 12/12/2021 at AM     omeprazole 20 MG tablet Take 20 mg by mouth every evening  12/12/2021 at Unknown time     propylene glycol (SYSTANE BALANCE) 0.6 % SOLN ophthalmic solution Place 1 drop into both eyes 2 times daily  12/13/2021 at AM, Pt brought     ursodiol (ACTIGALL) 500 MG tablet Take 1 tablet (500 mg) by mouth 3 times daily 12/12/2021 at PM       Information source(s): Patient, Clinic records and [facility]    Method of interview communication: in-person    Patient was asked about OTC/herbal products specifically.  PTA med list reflects this.    Based on the pharmacist's assessment, the PTA med list information appears reliable    Allergies were reviewed, assessed, and updated with the patient.      Medications available for use during hospital stay: systane.      Thank you for the opportunity to participate in the care of this patient.      Ewelina Dewitt RPH     12/13/2021     7:02 AM

## 2021-12-13 NOTE — ANESTHESIA POSTPROCEDURE EVALUATION
Patient: Gail Dewitt    Procedure: Procedure(s):  LEFT TOTAL KNEE ARTHROPLASTY       Diagnosis:Degenerative joint disease of left knee [M17.12]  Diagnosis Additional Information: No value filed.    Anesthesia Type:  General    Note:  Disposition: Admission   Postop Pain Control: Uneventful            Sign Out: Well controlled pain   PONV: No   Neuro/Psych: Uneventful            Sign Out: Acceptable/Baseline neuro status   Airway/Respiratory: Uneventful            Sign Out: AIRWAY IN SITU/Resp. Support   CV/Hemodynamics: Uneventful            Sign Out: Acceptable CV status; No obvious hypovolemia; No obvious fluid overload   Other NRE: NONE   DID A NON-ROUTINE EVENT OCCUR? No           Last vitals:  Vitals Value Taken Time   /74 12/13/21 1050   Temp 36.6  C (97.9  F) 12/13/21 0932   Pulse 55 12/13/21 1112   Resp 13 12/13/21 1112   SpO2 95 % 12/13/21 1112   Vitals shown include unvalidated device data.    Electronically Signed By: ROBERT ARTHUR MD  December 13, 2021  11:23 AM

## 2021-12-14 ENCOUNTER — APPOINTMENT (OUTPATIENT)
Dept: PHYSICAL THERAPY | Facility: CLINIC | Age: 76
End: 2021-12-14
Attending: ORTHOPAEDIC SURGERY
Payer: MEDICARE

## 2021-12-14 ENCOUNTER — APPOINTMENT (OUTPATIENT)
Dept: OCCUPATIONAL THERAPY | Facility: CLINIC | Age: 76
End: 2021-12-14
Attending: ORTHOPAEDIC SURGERY
Payer: MEDICARE

## 2021-12-14 LAB
ANION GAP SERPL CALCULATED.3IONS-SCNC: 8 MMOL/L (ref 5–18)
BUN SERPL-MCNC: 13 MG/DL (ref 8–28)
CALCIUM SERPL-MCNC: 8 MG/DL (ref 8.5–10.5)
CHLORIDE BLD-SCNC: 103 MMOL/L (ref 98–107)
CO2 SERPL-SCNC: 25 MMOL/L (ref 22–31)
CREAT SERPL-MCNC: 0.71 MG/DL (ref 0.6–1.1)
GFR SERPL CREATININE-BSD FRML MDRD: 82 ML/MIN/1.73M2
GLUCOSE BLD-MCNC: 134 MG/DL (ref 70–125)
HGB BLD-MCNC: 10.1 G/DL (ref 11.7–15.7)
POTASSIUM BLD-SCNC: 3.9 MMOL/L (ref 3.5–5)
SODIUM SERPL-SCNC: 136 MMOL/L (ref 136–145)

## 2021-12-14 PROCEDURE — 250N000013 HC RX MED GY IP 250 OP 250 PS 637: Performed by: INTERNAL MEDICINE

## 2021-12-14 PROCEDURE — 97110 THERAPEUTIC EXERCISES: CPT | Mod: GP

## 2021-12-14 PROCEDURE — 97116 GAIT TRAINING THERAPY: CPT | Mod: GP

## 2021-12-14 PROCEDURE — 250N000013 HC RX MED GY IP 250 OP 250 PS 637: Performed by: ORTHOPAEDIC SURGERY

## 2021-12-14 PROCEDURE — 80048 BASIC METABOLIC PNL TOTAL CA: CPT | Performed by: HOSPITALIST

## 2021-12-14 PROCEDURE — 85018 HEMOGLOBIN: CPT | Performed by: ORTHOPAEDIC SURGERY

## 2021-12-14 PROCEDURE — 97535 SELF CARE MNGMENT TRAINING: CPT | Mod: GO,59

## 2021-12-14 PROCEDURE — 250N000011 HC RX IP 250 OP 636: Performed by: NURSE PRACTITIONER

## 2021-12-14 PROCEDURE — 97530 THERAPEUTIC ACTIVITIES: CPT | Mod: GP

## 2021-12-14 PROCEDURE — 250N000013 HC RX MED GY IP 250 OP 250 PS 637: Performed by: HOSPITALIST

## 2021-12-14 PROCEDURE — 36415 COLL VENOUS BLD VENIPUNCTURE: CPT | Performed by: HOSPITALIST

## 2021-12-14 PROCEDURE — 97166 OT EVAL MOD COMPLEX 45 MIN: CPT | Mod: GO

## 2021-12-14 RX ORDER — POLYETHYLENE GLYCOL 3350 17 G/17G
17 POWDER, FOR SOLUTION ORAL DAILY
Qty: 510 G | Refills: 0 | Status: ON HOLD | COMMUNITY
Start: 2021-12-14 | End: 2022-04-16

## 2021-12-14 RX ORDER — LIDOCAINE 4 G/G
1 PATCH TOPICAL ONCE
Status: COMPLETED | OUTPATIENT
Start: 2021-12-14 | End: 2021-12-14

## 2021-12-14 RX ORDER — METHOCARBAMOL 500 MG/1
500 TABLET, FILM COATED ORAL EVERY 6 HOURS PRN
Qty: 25 TABLET | Refills: 0 | Status: ON HOLD | OUTPATIENT
Start: 2021-12-14 | End: 2022-04-16

## 2021-12-14 RX ORDER — HYDROCODONE BITARTRATE AND ACETAMINOPHEN 5; 325 MG/1; MG/1
1-2 TABLET ORAL EVERY 4 HOURS PRN
Qty: 52 TABLET | Refills: 0 | Status: ON HOLD | OUTPATIENT
Start: 2021-12-14 | End: 2022-04-16

## 2021-12-14 RX ORDER — ACETAMINOPHEN 325 MG/1
650 TABLET ORAL EVERY 4 HOURS PRN
Status: ON HOLD | COMMUNITY
Start: 2021-12-16 | End: 2022-04-16

## 2021-12-14 RX ORDER — KETOROLAC TROMETHAMINE 30 MG/ML
15 INJECTION, SOLUTION INTRAMUSCULAR; INTRAVENOUS EVERY 6 HOURS PRN
Status: COMPLETED | OUTPATIENT
Start: 2021-12-14 | End: 2021-12-14

## 2021-12-14 RX ORDER — IBUPROFEN 200 MG
400-600 TABLET ORAL EVERY 6 HOURS PRN
Status: ON HOLD | COMMUNITY
Start: 2021-12-14 | End: 2022-04-16

## 2021-12-14 RX ORDER — HYDROCODONE BITARTRATE AND ACETAMINOPHEN 5; 325 MG/1; MG/1
1-2 TABLET ORAL EVERY 4 HOURS PRN
Status: DISCONTINUED | OUTPATIENT
Start: 2021-12-14 | End: 2021-12-15 | Stop reason: HOSPADM

## 2021-12-14 RX ADMIN — HYDROCODONE BITARTRATE AND ACETAMINOPHEN 2 TABLET: 5; 325 TABLET ORAL at 12:18

## 2021-12-14 RX ADMIN — LEVOTHYROXINE SODIUM 150 MCG: 75 TABLET ORAL at 08:26

## 2021-12-14 RX ADMIN — URSODIOL 500 MG: 250 TABLET, FILM COATED ORAL at 18:22

## 2021-12-14 RX ADMIN — POLYETHYLENE GLYCOL 3350 17 G: 17 POWDER, FOR SOLUTION ORAL at 08:26

## 2021-12-14 RX ADMIN — METHOCARBAMOL TABLETS 500 MG: 500 TABLET, COATED ORAL at 04:01

## 2021-12-14 RX ADMIN — LOSARTAN POTASSIUM 50 MG: 50 TABLET, FILM COATED ORAL at 10:42

## 2021-12-14 RX ADMIN — SENNOSIDES AND DOCUSATE SODIUM 1 TABLET: 50; 8.6 TABLET ORAL at 08:26

## 2021-12-14 RX ADMIN — METHOCARBAMOL TABLETS 500 MG: 500 TABLET, COATED ORAL at 21:00

## 2021-12-14 RX ADMIN — HYDROCODONE BITARTRATE AND ACETAMINOPHEN 2 TABLET: 5; 325 TABLET ORAL at 08:26

## 2021-12-14 RX ADMIN — HYDROCODONE BITARTRATE AND ACETAMINOPHEN 2 TABLET: 5; 325 TABLET ORAL at 16:09

## 2021-12-14 RX ADMIN — PANTOPRAZOLE SODIUM 40 MG: 20 TABLET, DELAYED RELEASE ORAL at 20:40

## 2021-12-14 RX ADMIN — METHOCARBAMOL TABLETS 500 MG: 500 TABLET, COATED ORAL at 13:03

## 2021-12-14 RX ADMIN — KETOROLAC TROMETHAMINE 15 MG: 30 INJECTION, SOLUTION INTRAMUSCULAR at 14:34

## 2021-12-14 RX ADMIN — HYDROCODONE BITARTRATE AND ACETAMINOPHEN 2 TABLET: 5; 325 TABLET ORAL at 20:41

## 2021-12-14 RX ADMIN — KETOROLAC TROMETHAMINE 15 MG: 30 INJECTION, SOLUTION INTRAMUSCULAR at 08:33

## 2021-12-14 RX ADMIN — HYDROXYZINE HYDROCHLORIDE 10 MG: 10 TABLET ORAL at 18:11

## 2021-12-14 RX ADMIN — ASPIRIN 325 MG: 325 TABLET, COATED ORAL at 08:26

## 2021-12-14 RX ADMIN — LOSARTAN POTASSIUM 50 MG: 50 TABLET, FILM COATED ORAL at 00:20

## 2021-12-14 RX ADMIN — URSODIOL 500 MG: 250 TABLET, FILM COATED ORAL at 08:28

## 2021-12-14 RX ADMIN — SENNOSIDES AND DOCUSATE SODIUM 1 TABLET: 50; 8.6 TABLET ORAL at 20:41

## 2021-12-14 RX ADMIN — URSODIOL 500 MG: 250 TABLET, FILM COATED ORAL at 12:18

## 2021-12-14 RX ADMIN — HYDROCODONE BITARTRATE AND ACETAMINOPHEN 2 TABLET: 5; 325 TABLET ORAL at 04:01

## 2021-12-14 RX ADMIN — LIDOCAINE 1 PATCH: 246 PATCH TOPICAL at 04:01

## 2021-12-14 ASSESSMENT — ACTIVITIES OF DAILY LIVING (ADL): DEPENDENT_IADLS:: INDEPENDENT

## 2021-12-14 NOTE — DISCHARGE SUMMARY
[facility] Discharge Summary                                  [facility]     DANIEL VAZQUEZ [id number]   Age: 76 year old  PCP: Aliyah Aggarwal, [phone] 1945     Date of Admission:  12/13/2021  Date of Discharge::  12/15/2021  Discharge Provider:  Genaro Freed NP    Code status:  Full Code    Admission Information:  Admission Diagnosis:  Degenerative joint disease of left knee [M17.12]  Left knee DJD [M17.12]    Post-Operative Day: 2 Day Post-Op     Reason for admission:  The patient was admitted for the following:Procedure(s) (LRB):  LEFT TOTAL KNEE ARTHROPLASTY (Left)    Active Problems:    Left knee DJD      Allergies:  Dilaudid [hydromorphone hcl], Ace inhibitors, Amlodipine besylate [amlodipine], Hydromorphone, and Percocet [oxycodone-acetaminophen]    Following the procedure noted above the patient was transferred to the post-op floor and started on:    Therapy:  physical therapy and occupational therapy  Anticoagulation Plan: scoanticoagulationlist2:  mg daily  for 42 days  Pain Management: scopainmedication: norco, tylenol and robaxin. Hydroxyzine ordered, not utilized but sent home with the patient.   Weight bearing status: Weight bearing as tolerated     The patient was followed by Orthopedics during the inpatient treatment course:  Complications:  High pain, but once under control with the above, became tolerable.   Additional consultations:  [facility], appreciate their consult.      Pertinent Labs   Lab Results: personally reviewed.     Recent Labs   Lab Test 12/13/21  0718 08/04/21  0735 09/04/20  1630 09/03/19  0920 07/10/16  1445 03/28/16  1114 01/04/16  0840 03/25/15  1018   INR 1.04 0.99 1.05 1.03   < >  --   --   --    HGB 11.5* 12.4 11.6* 13.0   < >  --   --   --    HCT 35.6 37.5 36.3 40.5   < >  --   --   --    MCV 84 84 84 91   < >  --   --   --     233 247 203   < >  --   --   --    NA  --  139 140 139   < >  --    < > 137   CRP  --   --   --    --   --  4.5  --  5.2    < > = values in this interval not displayed.          Discharge Information:  Condition at discharge: Stable  Discharge destination:  Discharged to home     Medications at discharge:  Current Discharge Medication List      START taking these medications    Details   acetaminophen (TYLENOL) 325 MG tablet Take 2 tablets (650 mg) by mouth every 4 hours as needed for other (For optimal non-opioid multimodal pain management to improve pain control.)    Comments: Maximum 4,000mg per day from all sources.      aspirin (ASA) 325 MG EC tablet Take 1 tablet (325 mg) by mouth daily  Qty: 42 tablet, Refills: 0    Comments: Take 1 aspirin once a day for 42 days to prevent blood clots. Take with food. Do not take at same time as other anti-inflammatories, like ibuprofen or celebrex. After 42 days, resume 81 mg aspirin daily.  Associated Diagnoses: History of total knee replacement, left      HYDROcodone-acetaminophen (NORCO) 5-325 MG tablet Take 1-2 tablets by mouth every 4 hours as needed for moderate to severe pain  Qty: 52 tablet, Refills: 0    Comments: Take 1 pill for moderate pain (4-6/10) and 2 pills for severe pain (7-10/10). Maximum 8 pills per day. Alternate with regular strength tylenol. Limit Acetaminophen to 4,000mg from all sources per day.      hydrOXYzine (ATARAX) 10 MG tablet Take 1 tablet (10 mg) by mouth every 6 hours as needed for itching or anxiety (with pain, moderate pain)  Qty: 30 tablet, Refills: 0    Associated Diagnoses: History of total knee replacement, left      ibuprofen (ADVIL/MOTRIN) 200 MG tablet Take 2-3 tablets (400-600 mg) by mouth every 6 hours as needed for mild pain    Comments: Do not use any other antiinflammatories while taking Ibuprofen.      methocarbamol (ROBAXIN) 500 MG tablet Take 1 tablet (500 mg) by mouth every 6 hours as needed for muscle spasms  Qty: 25 tablet, Refills: 0      polyethylene glycol (MIRALAX) 17 GM/Dose powder Take 17 g by mouth daily  Qty:  510 g, Refills: 0    Comments: Take while taking narcotics medications and until bowels are back to normal routine. Hold for loose stools      senna-docusate (SENOKOT-S/PERICOLACE) 8.6-50 MG tablet Take 1-2 tablets by mouth 2 times daily Take while on oral narcotics to prevent or treat constipation.  Qty: 30 tablet, Refills: 0    Comments: While taking narcotics  Associated Diagnoses: History of total knee replacement, left         CONTINUE these medications which have NOT CHANGED    Details   Calcium Carbonate-Vit D-Min (CALCIUM 1200 PO) Take 1 tablet by mouth daily      Cholecalciferol (VITAMIN D) 2000 UNITS CAPS Take 1,000 Units by mouth daily       diphenhydrAMINE-acetaminophen (TYLENOL PM EXTRA STRENGTH)  MG tablet Take 2 tablets by mouth At Bedtime     Associated Diagnoses: Pain of left lower extremity      levothyroxine (SYNTHROID/LEVOTHROID) 150 MCG tablet Take 150 mcg by mouth daily      losartan (COZAAR) 50 MG tablet Take 1 tablet (50 mg) by mouth daily  Qty: 90 tablet, Refills: 3    Comments: Profile Rx: patient will contact pharmacy when needed  Associated Diagnoses: Benign essential hypertension      omeprazole 20 MG tablet Take 20 mg by mouth every evening       propylene glycol (SYSTANE BALANCE) 0.6 % SOLN ophthalmic solution Place 1 drop into both eyes 2 times daily       ursodiol (ACTIGALL) 500 MG tablet Take 1 tablet (500 mg) by mouth 3 times daily  Qty: 270 tablet, Refills: 3    Associated Diagnoses: Primary biliary cholangitis (H)         STOP taking these medications       ASPIRIN 81 MG OR TABS Comments:   Reason for Stopping:                          Follow-Up Care:  Patient should be seen in the office in 10-14 days by the Orthopedic Surgeon/Physician Assistant.  Call [phone] for appointment or questions.    It was my pleasure to take care of the above patient.  Genaro Freed NP

## 2021-12-14 NOTE — CONSULTS
Care Management Initial Consult    General Information  Assessment completed with: Patient,    Type of CM/SW Visit: Initial Assessment    Primary Care Provider verified and updated as needed: Yes   Readmission within the last 30 days: no previous admission in last 30 days         Advance Care Planning:            Communication Assessment  Patient's communication style: spoken language (English or Bilingual)    Hearing Difficulty or Deaf: no   Wear Glasses or Blind: yes    Cognitive  Cognitive/Neuro/Behavioral: WDL  Level of Consciousness: sedated  Arousal Level: arouses to voice                Living Environment:   People in home: spouse     Current living Arrangements: house      Able to return to prior arrangements: yes       Family/Social Support:  Care provided by: self  Provides care for: no one  Marital Status:   ,Children          Description of Support System: Supportive,Involved         Current Resources:   Patient receiving home care services: No     Community Resources: None  Equipment currently used at home: walker, rolling  Supplies currently used at home: None    Employment/Financial:  Employment Status:          Financial Concerns:             Lifestyle & Psychosocial Needs:  Social Determinants of Health     Tobacco Use: Medium Risk     Smoking Tobacco Use: Former Smoker     Smokeless Tobacco Use: Never Used   Alcohol Use: Not on file   Financial Resource Strain: Not on file   Food Insecurity: Not on file   Transportation Needs: Not on file   Physical Activity: Not on file   Stress: Not on file   Social Connections: Not on file   Intimate Partner Violence: Not on file   Depression: Not on file   Housing Stability: Not on file       Functional Status:  Prior to admission patient needed assistance:   Dependent ADLs:: Independent  Dependent IADLs:: Independent         Additional Information:  Assessment completed with patient. Patient is independent at baseline with ADLs and IADLs. She  ambulates without devices but has a walker if needed. She states her spouse has chronic COPD and on oxygen. He is not able to assist her other than warming up food.  Patient's daughter can stay over night.  Patient states she is unsure if she should go home with home care vs TCU. Will wait to see how she progresses today and therapy recommendations. Daughter will transport at discharge.     Final discharge plan pending progression and recommendations.     2:52 PM TCU recommended or home with 24 hour assist. Daughter and patient prefer home with home care. [facility].       Michaelle Rodriguez RN

## 2021-12-14 NOTE — PROGRESS NOTES
"[facility] Orthopaedics Progress Note      Post-operative Day: 1 Day Post-Op    Procedure(s):  LEFT TOTAL KNEE ARTHROPLASTY        Plan: Anticoagulation protocol: scoanticoagulationlist2:  mg daily  x 42  days            Pain medications: norco 1-2 tabs q4 hours, toradol, tylenol, hydroxyzine, and robaxin. I deferred her occupational therapy this morning, until her pain is under better control.    Discussed with patient that when using Norco 2 tabs Q 4 hours, the medication is effective. She also felt robaxin was helpful. Therefore, I am restarting Tylenol to be used in addition (Max 4,000mg per day from all sources) and ordered 2 doses of Toradol PRN. Recommend one dose now. No history of kidney disease and creatinine yesterday was WNL.     Recommend drain removal as drain was under 50 cc this morning and became unattached.             Weight bearing status:  WBAT            Disposition:  Home today after therapies this afternoon.             Continue cares and rehabilitation     Subjective:  Gail had severe pain overnight and unfortunately did not sleep well. She continues to have severe pain this morning and was seen after not progressing in physical therapy due to pain. She will need one additional session of PT.     Pain: moderate and severe  Chest pain, SOB:  No  Denies lightheadedness/dizziness  Denies nausea/ vomiting  Passing flatus  voiding well    Objective:  Blood pressure (!) 144/66, pulse 70, temperature 98.5  F (36.9  C), temperature source Oral, resp. rate 16, height 1.6 m (5' 3\"), weight 97.5 kg (215 lb), SpO2 95 %, not currently breastfeeding.    Patient Vitals for the past 24 hrs:   BP Temp Temp src Pulse Resp SpO2   12/14/21 0801 (!) 144/66 98.5  F (36.9  C) Oral 70 16 95 %   12/14/21 0019 (!) 148/70 -- -- -- -- --   12/13/21 2307 (!) 176/79 98.2  F (36.8  C) Oral 74 16 92 %   12/13/21 2122 (!) 172/75 98  F (36.7  C) Oral 75 16 92 %   12/13/21 1725 (!) 147/67 97.8  F (36.6  C) Oral 76 16 " 93 %   12/13/21 1646 137/64 -- -- 72 -- --   12/13/21 1625 (!) 179/81 98.3  F (36.8  C) Oral 84 16 93 %   12/13/21 1525 (!) 188/81 98.1  F (36.7  C) Oral 92 16 94 %   12/13/21 1425 (!) 140/70 98.1  F (36.7  C) Oral 79 16 91 %   12/13/21 1355 (!) 148/67 97.5  F (36.4  C) Oral 82 16 91 %   12/13/21 1325 (!) 160/77 98.8  F (37.1  C) Oral 88 14 91 %   12/13/21 1300 (!) 152/70 -- -- 81 16 94 %   12/13/21 1130 (!) 172/79 98.8  F (37.1  C) Temporal 88 16 95 %   12/13/21 1030 (!) 165/74 -- -- 86 14 99 %   12/13/21 1024 -- -- -- 88 20 99 %   12/13/21 1011 (!) 179/77 -- -- 88 9 96 %   12/13/21 1008 -- -- -- -- -- 93 %   12/13/21 1000 (!) 181/109 -- -- 94 30 92 %   12/13/21 0950 (!) 186/89 -- -- 101 20 91 %   12/13/21 0940 (!) 151/70 -- -- 108 16 94 %   12/13/21 0932 122/55 97.9  F (36.6  C) Temporal 109 18 92 %       Wt Readings from Last 4 Encounters:   12/13/21 97.5 kg (215 lb)   09/20/21 98.4 kg (217 lb)   09/03/19 97.4 kg (214 lb 12.8 oz)   04/11/18 98.8 kg (217 lb 12.8 oz)         Motor function, sensation, and circulation intact   Yes  Wound status: Aquacel is clean, with minimal shadowing, dry, and intact. YesDrain still present although per patient became disconnected this morning.   Calf tenderness: Bilateral  No    Pertinent Labs   Lab Results: personally reviewed.     Recent Labs   Lab Test 12/13/21  0718 08/04/21  0735 09/04/20  1630 09/03/19  0920 07/10/16  1445 03/28/16  1114 01/04/16  0840 03/25/15  1018   INR 1.04 0.99 1.05 1.03   < >  --   --   --    HGB 11.5* 12.4 11.6* 13.0   < >  --   --   --    HCT 35.6 37.5 36.3 40.5   < >  --   --   --    MCV 84 84 84 91   < >  --   --   --     233 247 203   < >  --   --   --    NA  --  139 140 139   < >  --    < > 137   CRP  --   --   --   --   --  4.5  --  5.2    < > = values in this interval not displayed.       Report completed by:  Genaro Freed, NP  Date: 12/14/2021

## 2021-12-14 NOTE — PROGRESS NOTES
[facility] Medicine PROGRESS NOTE      Identification/Summary:   77yo female admitted for left total knee arthroplasty.  Has medical history of essential hypertension, hypothyroidism, biliary cholangitis.     Her outpatient medications include losartan, Tylenol PM at bedtime, levothyroxine, omeprazole, ursodiol.     Vital signs significant for mild hypertension.  Labs pending.    Assessment:  Essential hypertension, typically on losartan  Hypothyroidism, on levothyroxine  Gastroesophageal reflux disease, on omeprazole  Biliary cholangitis, on ursodiol chronically  Status post total knee arthroplasty, postop day 1     Plan:  Continue home medications with hold parameters on losartan  Patient has listed allergy to Dilaudid and oxycodone  Postoperative pain control and DVT prophylaxis per orthopedic surgery  clonidine to use as needed for systolic blood pressure greater than 180    Interval History/Subjective:  No chest pain, dyspnea, abdominal pain, nausea, vomiting. Had terrible pain last night, better this morning.     Physical Exam/Objective:  Gen: no acute distress, comfortable, alert, pleasant  ENT: no scleral icterus  Pulm: breathing comfortably at rest  CV: regular rate and rhythm, no pitting edema  Psych: appropriate affect    Medications:     aspirin  325 mg Oral Daily     famotidine  20 mg Oral BID    Or     famotidine  20 mg Intravenous BID     levothyroxine  150 mcg Oral Daily     lidocaine  1 patch Transdermal Once     losartan  50 mg Oral Daily     pantoprazole  40 mg Oral At Bedtime     polyethylene glycol  17 g Oral Daily     propylene glycol  1 drop Both Eyes BID     senna-docusate  1 tablet Oral BID     sodium chloride (PF)  3 mL Intracatheter Q8H     ursodiol  500 mg Oral TID       Harpreet Novoa DO   Hospitalist  [facility]

## 2021-12-15 ENCOUNTER — APPOINTMENT (OUTPATIENT)
Dept: OCCUPATIONAL THERAPY | Facility: CLINIC | Age: 76
End: 2021-12-15
Attending: ORTHOPAEDIC SURGERY
Payer: MEDICARE

## 2021-12-15 ENCOUNTER — APPOINTMENT (OUTPATIENT)
Dept: PHYSICAL THERAPY | Facility: CLINIC | Age: 76
End: 2021-12-15
Attending: ORTHOPAEDIC SURGERY
Payer: MEDICARE

## 2021-12-15 VITALS
SYSTOLIC BLOOD PRESSURE: 140 MMHG | OXYGEN SATURATION: 90 % | HEART RATE: 95 BPM | TEMPERATURE: 97.8 F | HEIGHT: 63 IN | BODY MASS INDEX: 38.09 KG/M2 | WEIGHT: 215 LBS | RESPIRATION RATE: 16 BRPM | DIASTOLIC BLOOD PRESSURE: 63 MMHG

## 2021-12-15 LAB
BACTERIA SPEC CULT: NORMAL
FASTING STATUS PATIENT QL REPORTED: NORMAL
GLUCOSE BLD-MCNC: 93 MG/DL (ref 70–125)
HGB BLD-MCNC: 9.2 G/DL (ref 11.7–15.7)

## 2021-12-15 PROCEDURE — 36415 COLL VENOUS BLD VENIPUNCTURE: CPT | Performed by: ORTHOPAEDIC SURGERY

## 2021-12-15 PROCEDURE — 85018 HEMOGLOBIN: CPT | Performed by: ORTHOPAEDIC SURGERY

## 2021-12-15 PROCEDURE — 250N000013 HC RX MED GY IP 250 OP 250 PS 637: Performed by: HOSPITALIST

## 2021-12-15 PROCEDURE — 250N000013 HC RX MED GY IP 250 OP 250 PS 637: Performed by: INTERNAL MEDICINE

## 2021-12-15 PROCEDURE — 97110 THERAPEUTIC EXERCISES: CPT | Mod: GP

## 2021-12-15 PROCEDURE — 250N000013 HC RX MED GY IP 250 OP 250 PS 637: Performed by: ORTHOPAEDIC SURGERY

## 2021-12-15 PROCEDURE — 97116 GAIT TRAINING THERAPY: CPT | Mod: GP

## 2021-12-15 PROCEDURE — 97535 SELF CARE MNGMENT TRAINING: CPT | Mod: GO

## 2021-12-15 PROCEDURE — 82947 ASSAY GLUCOSE BLOOD QUANT: CPT | Performed by: ORTHOPAEDIC SURGERY

## 2021-12-15 RX ADMIN — URSODIOL 500 MG: 250 TABLET, FILM COATED ORAL at 08:51

## 2021-12-15 RX ADMIN — LOSARTAN POTASSIUM 50 MG: 50 TABLET, FILM COATED ORAL at 09:00

## 2021-12-15 RX ADMIN — ASPIRIN 325 MG: 325 TABLET, COATED ORAL at 08:51

## 2021-12-15 RX ADMIN — HYDROCODONE BITARTRATE AND ACETAMINOPHEN 2 TABLET: 5; 325 TABLET ORAL at 08:59

## 2021-12-15 RX ADMIN — HYDROCODONE BITARTRATE AND ACETAMINOPHEN 1 TABLET: 5; 325 TABLET ORAL at 01:09

## 2021-12-15 RX ADMIN — POLYETHYLENE GLYCOL 3350 17 G: 17 POWDER, FOR SOLUTION ORAL at 08:51

## 2021-12-15 RX ADMIN — METHOCARBAMOL TABLETS 500 MG: 500 TABLET, COATED ORAL at 12:07

## 2021-12-15 RX ADMIN — SENNOSIDES AND DOCUSATE SODIUM 1 TABLET: 50; 8.6 TABLET ORAL at 08:51

## 2021-12-15 RX ADMIN — METHOCARBAMOL TABLETS 500 MG: 500 TABLET, COATED ORAL at 04:41

## 2021-12-15 RX ADMIN — HYDROCODONE BITARTRATE AND ACETAMINOPHEN 2 TABLET: 5; 325 TABLET ORAL at 05:19

## 2021-12-15 RX ADMIN — LEVOTHYROXINE SODIUM 150 MCG: 75 TABLET ORAL at 08:50

## 2021-12-15 NOTE — CONSULTS
"ACUPUNCTURIST TREATMENT NOTE    Name: Gail Dewitt  :  1945  MRN:  [id number]    Acupuncture Treatment  Patient Type: Maternity  Intervention Reason: Pain,GI Support Specify:  Gi Support Specify:: Constipation  Pain Location: (L) knee pain  Pre-session Pain Ratin  Post-session Pain Ratin  Patient complaint:: (L) knee pain; constipation  Initial insertions: (R) (Sp 9, St 36, GB 34); (R) (LI 11, TW 14, Rissa 5, dahlia x2); (L) (abdominal 3 cun lateral to Raza 3, Raza 4); (L) (TW 6)         \"Risks and benefits of acupuncture were discussed with patient. Consent for treatment was given. We thank you for the referral.\"     Serena Linton L.Ac.     Date:  12/15/2021  Time:  11:57 AM    "

## 2021-12-15 NOTE — DISCHARGE INSTRUCTIONS
Quinton Cleary MD    Attending    Since 12/13/2021    [phone]   To be seen in weeks    Aliyah Aggarwal MD    General - Family Medicine    [phone]   It is recommended to follow up with your primary care provider in 7 days      Home care referral has been made for you to [facility]. They will call you to arrange home visit.  [facility]  [address] [place] [address], [facility]  [phone]

## 2021-12-15 NOTE — PROGRESS NOTES
"[facility] Orthopaedics Progress Note      Post-operative Day: 1 Day Post-Op    Procedure(s):  LEFT TOTAL KNEE ARTHROPLASTY        Plan: Anticoagulation protocol:  mg dialy  x 42  days            Pain medications: norco 1-2 tabs q4 hours, tylenol, hydroxyzine, and robaxin.  Discussed with patient that when using Norco 2 tabs Q 4 hours, and Robaxin Q 6 hours- medications are effective.  No history of kidney disease and creatinine yesterday was WNL again yesterday morning.             Weight bearing status:  WBAT            Disposition:  Home today after therapies with home care.            Continue cares and rehabilitation     Subjective:  Gail is feeling significantly better today, reporting sleeping for 12 hours \"which I don't even do at home.\"     Pain: moderate at this time, can be severe.   Chest pain, SOB:  No  Denies lightheadedness/dizziness  Denies nausea/ vomiting  Passing flatus  voiding well    Objective:  Blood pressure 127/61, pulse 95, temperature 98.1  F (36.7  C), temperature source Oral, resp. rate 16, height 1.6 m (5' 3\"), weight 97.5 kg (215 lb), SpO2 91 %, not currently breastfeeding.    Patient Vitals for the past 24 hrs:   BP Temp Temp src Pulse Resp SpO2   12/15/21 0100 127/61 98.1  F (36.7  C) Oral 95 16 91 %   12/14/21 2025 136/59 97.3  F (36.3  C) Oral 76 18 93 %   12/14/21 1549 (!) 144/64 98.3  F (36.8  C) Oral 75 18 94 %   12/14/21 0801 (!) 144/66 98.5  F (36.9  C) Oral 70 16 95 %       Wt Readings from Last 4 Encounters:   12/13/21 97.5 kg (215 lb)   09/20/21 98.4 kg (217 lb)   09/03/19 97.4 kg (214 lb 12.8 oz)   04/11/18 98.8 kg (217 lb 12.8 oz)         Motor function, sensation, and circulation intact   Yes  Wound status: Aquacel is clean, with minimal shadowing, dry, and intact. YesDrain still present although per patient became disconnected this morning.   Calf tenderness: Bilateral  No    Pertinent Labs   Lab Results: personally reviewed.     Recent Labs   Lab Test " 12/13/21  0718 08/04/21  0735 09/04/20  1630 09/03/19  0920 07/10/16  1445 03/28/16  1114 01/04/16  0840 03/25/15  1018   INR 1.04 0.99 1.05 1.03   < >  --   --   --    HGB 11.5* 12.4 11.6* 13.0   < >  --   --   --    HCT 35.6 37.5 36.3 40.5   < >  --   --   --    MCV 84 84 84 91   < >  --   --   --     233 247 203   < >  --   --   --    NA  --  139 140 139   < >  --    < > 137   CRP  --   --   --   --   --  4.5  --  5.2    < > = values in this interval not displayed.       Report completed by:  Genaro Freed, NP  Date: 12/15/2021

## 2021-12-15 NOTE — PLAN OF CARE
Problem: Adult Inpatient Plan of Care  Goal: Optimal Comfort and Wellbeing  12/14/2021 0514 by Arcelia Dewitt, RN  Outcome: No Change  12/14/2021 0513 by Arcelia Dewitt, RN  Reactivated     Patient is alert and oriented, call light appropriate and within reach. Ambulating to/from bathroom with assist from one staff, walker and belt. Reporting moderate pain in left knee, utilizing ice packs, repositioning, rest, distraction for comfort. Patient requesting stronger pain intervention and states that 1 tab Norco is not getting her through q6 hrs as ordered. [facility] on-call paged and new order for Norco was obtained. Voiding adequate amounts. Hemovac drain intact.   
  Problem: Adult Inpatient Plan of Care  Goal: Readiness for Transition of Care  12/14/2021 0522 by Arcelia Dewitt, RN  Outcome: No Change  12/14/2021 0513 by Arcelia Dewitt RN  Reactivated     Patient vital signs are at baseline: Yes  Patient able to ambulate as they were prior to admission or with assist devices provided by therapies during their stay:  Yes  Patient MUST void prior to discharge:  Yes  Patient able to tolerate oral intake:  Yes  Pain has adequate pain control using Oral analgesics:  No,  Reason:  new orders obtained overnight.    Patient is alert and oriented, call light appropriate and within reach. Unable to manage pain in LLE with PRN [place]. Writer paged [facility] x3 with no response, to obtain a new order for pain intervention. Patient extremely upset and tearful. Hospitalist paged for assistance with pain management, and orders were obtained. Pain is currently being managed with PRN Norco q4, lidocaine patch, Robaxin, ice packs, rest, dark and quiet environment. Losartan restarted overnight per patient's request for elevated Bp's. Hemovac intact with serous output. Surgical dressing CDI, ace wrap removed for comfort. Patient up with assistance from one staff, walker and belt. Patient was hopeful to transition home today however pain management has now become a concern for her.     
  Problem: Adult Inpatient Plan of Care  Goal: Readiness for Transition of Care  Outcome: Improving    Patient vital signs are at baseline: Yes  Patient able to ambulate as they were prior to admission or with assist devices provided by therapies during their stay:  No,  Reason:  did not pass therapy sessions yesterday, will work with them again today.  Patient MUST void prior to discharge:  Yes  Patient able to tolerate oral intake:  Yes  Pain has adequate pain control using Oral analgesics:  Yes    Left knee pain being managed this shift with prn Norco, Robaxin, ice, repositioning, distraction and quiet environment. Patient up with assistance from one staff, walker and belt. Plan is to work with therapies today and hopeful to transition home once medically cleared. VSS on room air.  
  Problem: Pain (Knee Arthroplasty)  Goal: Acceptable Pain Control  Outcome: Improving  Intervention: Prevent or Manage Pain  Recent Flowsheet Documentation  Taken 12/14/2021 2025 by Arcelia Dewitt RN  Pain Management Interventions: medication (see MAR)     Patient alert and oriented, call light appropriate and within reach. Patient up with assistance from one staff, walker and belt. Pain is currently being managed with prn Norco, Robaxin, ice, rest, repositioning and dark and quiet environment. VSS on room air. Bed alarm on. Surgical dressing CDI.  
  Problem: Pain (Knee Arthroplasty)  Goal: Acceptable Pain Control  Outcome: No Change       Patient vital signs are at baseline: Yes  Patient able to ambulate as they were prior to admission or with assist devices provided by therapies during their stay:  No,  Reason:  Pt has failed PT and OT x 2 today, they will continue to work with patient tomorrow and are currently recommending TCU  Patient MUST void prior to discharge:  Yes  Patient able to tolerate oral intake:  Yes  Pain has adequate pain control using Oral analgesics:  No,  Reason:  IV toradol ordered x 2.     Will continue to monitor pt for pain, ranges from 7-3/10. Can get norco q4 hours and robaxin q 6 hours.       Yamilka Howard RN    
  Problem: Pain (Knee Arthroplasty)  Goal: Acceptable Pain Control  Outcome: No Change  Intervention: Prevent or Manage Pain  Recent Flowsheet Documentation  Taken 12/14/2021 1700 by Tara Ivey, RN  Complementary Therapy: music therapy provided  Taken 12/14/2021 1609 by Tara Ivey, RN  Pain Management Interventions: medication (see MAR)   Patient continues to rate pain high with pain measures implemented.  Iv, oral and topical pain control in use.  Ice packs and pillows for support.    
Occupational Therapy Discharge Summary    Reason for therapy discharge:    Discharged to home with home therapy.    Progress towards therapy goal(s). See goals on Care Plan in [facility] electronic health record for goal details.  Goals met    Therapy recommendation(s):    Continued therapy is recommended.  Rationale/Recommendations:  ensure safety at home.      
Physical Therapy Discharge Summary    Reason for therapy discharge:    Discharged to home with home therapy.    Progress towards therapy goal(s). See goals on Care Plan in [facility] electronic health record for goal details.  Goals met    Therapy recommendation(s):    Continued therapy is recommended.  Rationale/Recommendations:  Patient continues to have high level of pain, very limited ambulation ability, and would benefit from HH PT . .      
Problem: Adult Inpatient Plan of Care  Goal: Plan of Care Review  Outcome: Change based on patient need/priority    Pt rating LLE pain 4-6/10. Prn norco given and cold pack applied and in place. Pt states effective. Pt tolerating diet, denies n/v. Pt up ambulating to restroom with A:1, tolerating well. Voiding without issue but states has not passed gas. IVMF LR @ 100 mL/hr. Pt LLE dressing C,D,I. Hemovac patent to bulb suction.       
[id number] Comprehensive

## 2021-12-15 NOTE — PROGRESS NOTES
[facility] Medicine PROGRESS NOTE      Identification/Summary:   75yo female admitted for left total knee arthroplasty.  Has medical history of essential hypertension, hypothyroidism, biliary cholangitis.     Her outpatient medications include losartan, Tylenol PM at bedtime, levothyroxine, omeprazole, ursodiol.       Assessment:  Essential hypertension, typically on losartan  Hypothyroidism, on levothyroxine  Gastroesophageal reflux disease, on omeprazole  Biliary cholangitis, on ursodiol chronically  Postoperative acute blood loss anemia, asymptomatic  Status post total knee arthroplasty, postop day 2     Plan:  Okay to discharge medically, med rec completed for home meds  Postoperative pain control and DVT prophylaxis per orthopedic surgery    Interval History/Subjective:  Feeling 500% better today, slept for 12 hours yesterday.  Denies chest pain, nausea, vomiting, shortness of breath.    Physical Exam/Objective:  Gen: no acute distress, comfortable, alert, pleasant  ENT: no scleral icterus  Pulm: breathing comfortably at rest  CV: regular rate and rhythm, no pitting edema  Psych: appropriate affect    Medications:     aspirin  325 mg Oral Daily     famotidine  20 mg Oral BID    Or     famotidine  20 mg Intravenous BID     levothyroxine  150 mcg Oral Daily     losartan  50 mg Oral Daily     pantoprazole  40 mg Oral At Bedtime     polyethylene glycol  17 g Oral Daily     propylene glycol  1 drop Both Eyes BID     senna-docusate  1 tablet Oral BID     sodium chloride (PF)  3 mL Intracatheter Q8H     ursodiol  500 mg Oral TID       Harpreet Novoa DO   Hospitalist  [facility]

## 2021-12-15 NOTE — PROGRESS NOTES
Care Management Discharge Note    Discharge Date: 12/15/2021       Discharge Disposition: Home    Discharge Services:  Home care PT    Discharge DME:  Daughter picking up leg grabber    Discharge Transportation: family or friend will provide      Education Provided on the Discharge Plan:  yes  Persons Notified of Discharge Plans: yes  Patient/Family in Agreement with the Plan: yes    Handoff Referral Completed:  yes    Additional Information:  Met with patient and daughter-Delmi to confirm discharge plan. Patient to return home with support from Delmi. [facility] accepted for PT. Orders faxed. Delmi to transport home.         Michaelle Rodriguez RN

## 2021-12-15 NOTE — PROGRESS NOTES
Please inform this patient her MRSA nares colonization screen culture was negative on 12/13/21 if she hasn't been informed already.     I have spoken to this patient prior to her surgery about what is needed to discontinue the infection/isolation flags for future encounters.     If possible, please remind her she can go to her PCP or other provider to request a MRSA nares surveillance culture be ordered/collected one week from 12/13/21 as long as the swab is collected one week after she ends antibiotic therapy affective against MRSA (if applicable). Thank you for sharing this info with her as she does want the flag removed.

## 2021-12-15 NOTE — PROGRESS NOTES
Discharged to home with daughter. Stated understanding of discharge instructions. Checked room for belongings. Stated that she has her belongings.

## 2021-12-17 ENCOUNTER — DOCUMENTATION ONLY (OUTPATIENT)
Dept: OTHER | Facility: CLINIC | Age: 76
End: 2021-12-17
Payer: MEDICARE

## 2022-01-11 ENCOUNTER — TRANSCRIBE ORDERS (OUTPATIENT)
Dept: OTHER | Age: 77
End: 2022-01-11
Payer: COMMERCIAL

## 2022-01-11 ENCOUNTER — HOSPITAL ENCOUNTER (OUTPATIENT)
Dept: PHYSICAL THERAPY | Facility: CLINIC | Age: 77
Setting detail: THERAPIES SERIES
End: 2022-01-11
Attending: ORTHOPAEDIC SURGERY
Payer: COMMERCIAL

## 2022-01-11 DIAGNOSIS — M19.90 OSTEOARTHROSIS: ICD-10-CM

## 2022-01-11 DIAGNOSIS — M25.569 KNEE PAIN: Primary | ICD-10-CM

## 2022-01-11 PROCEDURE — 97110 THERAPEUTIC EXERCISES: CPT | Mod: GP | Performed by: PHYSICAL MEDICINE & REHABILITATION

## 2022-01-11 PROCEDURE — 97161 PT EVAL LOW COMPLEX 20 MIN: CPT | Mod: GP | Performed by: PHYSICAL MEDICINE & REHABILITATION

## 2022-01-11 NOTE — PROGRESS NOTES
[facility]    OUTPATIENT PHYSICAL THERAPY ORTHOPEDIC EVALUATION  PLAN OF TREATMENT FOR OUTPATIENT REHABILITATION  (COMPLETE FOR INITIAL CLAIMS ONLY)  Patient's Last Name, First Name, M.I.  YOB: 1945  Gail Dewitt    Provider s Name:  [facility]   Medical Record No.  [id number]   Start of Care Date:  01/11/22   Onset Date:  12/13/21   Type:     _X__PT   ___OT   ___SLP Medical Diagnosis:  knee pain; osteoarthritis     PT Diagnosis:  L knee pain s/p TKA   Visits from SOC:  1      _________________________________________________________________________________  Plan of Treatment/Functional Goals:  ADL retraining,balance training,bed mobility training,gait training,joint mobilization,manual therapy,motor coordination training,neuromuscular re-education,ROM,strengthening,stretching,transfer training     Cryotherapy,Electrical stimulation,Hot packs,TENS,Ultrasound  only as needed  Goals  Goal Identifier: 1  Goal Description: Pt will achieve 0* L knee extension in order to decrease difficulty with amb.   Target Date: 01/25/22    Goal Identifier: 2  Goal Description: Pt will achieve 120* L knee flexion in order to decrease difficulty with transfers and stairs.   Target Date: 02/08/22    Goal Identifier: 3  Goal Description: Pt will demonstrate 5/5 L knee strength in order to decrease difficulty with ADLs.   Target Date: 03/11/22    Goal Identifier: 4  Goal Description: Pt will be independent with HEP in order to self manage symptoms.  Target Date: 03/11/22      Therapy Frequency:  2 times/Week  Predicted Duration of Therapy Intervention:  8 weeks    Beronica Meehan PT                 I CERTIFY THE NEED FOR THESE SERVICES FURNISHED UNDER        THIS PLAN OF TREATMENT AND WHILE UNDER MY CARE .             Physician Signature                Date    X_____________________________________________________                             Certification Date From:  01/11/22   Certification Date To:  03/25/21    Referring Provider:  Dr. Cleary    Initial Assessment        See Epic Evaluation Start of Care Date: 01/11/22

## 2022-01-11 NOTE — PROGRESS NOTES
"   01/11/22 0800   General Information   Type of Visit Initial OP Ortho PT Evaluation   Start of Care Date 01/11/22   Referring Physician Dr. Cleary   Patient/Family Goals Statement \"want to get back to what I was doing, walk 2 blocks, be able to do stuff at home\"   Orders Evaluate and Treat   Date of Order 09/15/21   Certification Required? Yes  (MC)   Medical Diagnosis knee pain; osteoarthritis   Surgical/Medical history reviewed Yes   Precautions/Limitations no known precautions/limitations   General Information Comments PMHx per pt report: high BP, foot surgery B   Body Part(s)   Body Part(s) Knee   Presentation and Etiology   Pertinent history of current problem (include personal factors and/or comorbidities that impact the POC) Pt arrived to PT today for L knee pain s/p TKA performed 12/13/21.  Has had home care up until today.   Impairments A. Pain;B. Decreased WB tolerance;C. Swelling;D. Decreased ROM;E. Decreased flexibility;F. Decreased strength and endurance;G. Impaired balance;H. Impaired gait   Functional Limitations perform activities of daily living;perform desired leisure / sports activities   Symptom Location L knee   How/Where did it occur Other  (s/p TKA)   Onset date of current episode/exacerbation 12/13/21   Chronicity New   Pain rating (0-10 point scale) Best (/10);Worst (/10)   Best (/10) unable to quantify   Worst (/10) unable to quantify   Pain quality B. Dull;C. Aching   Frequency of pain/symptoms A. Constant   Pain/symptoms are: Worse during the day   Pain/symptoms exacerbated by B. Walking;G. Certain positions;J. ADL   Pain/symptoms eased by C. Rest;H. Cold;I. OTC medication(s)   Progression of symptoms since onset: Improved   Prior Level of Function   Prior Level of Function-Mobility independent   Prior Level of Function-ADLs independent   Current Level of Function   Current Community Support Family/friend caregiver   Patient role/employment history F. Retired   [facility]   Home/community accessibility lives in 1 story home   Current equipment-Gait/Locomotion Other  (uses SPC occasionally outside)   Fall Risk Screen   Fall screen completed by PT   Have you fallen 2 or more times in the past year? No   Have you fallen and had an injury in the past year? No   Is patient a fall risk? No   Abuse Screen (yes response referral indicated)   Feels Unsafe at Home or Work/School no   Feels Threatened by Someone no   Does Anyone Try to Keep You From Having Contact with Others or Doing Things Outside Your Home? no   Physical Signs of Abuse Present no   Functional Scales   Functional Scales Other   Other Scales  LEFS: 32/80   Knee Objective Findings   Side (if bilateral, select both right and left) Left   Integumentary  slight residual edema   Posture rounded shoulders, forward head   Gait/Locomotion pt amb with decreased stride length, stance time, decreased heel toe amb   Left Knee Extension AROM lacking 4*   Left Knee Extension PROM lacking 4*   Left Knee Flexion AROM 110*   Left Knee Flexion PROM 114*   Left Knee Flexion Strength 4/5   Left Knee Extension Strength 4/5   Left Hip Abduction Strength 4/5   Left Quad Set Strength fair   L VMO Strength fair   Left Gastrocnemius Flexibility WNL   Left Hamstring Flexibility limited   Left Hip Flexor Flexibility slight limitation   Left Quadricep Flexibility limited   Accessory Motion/Joint Mobility NT per MD diagnosis. Will assess at upcoming appts as appropriate   Knee Special Test Comments special tests NT per MD diagnosis   Foot Position In Standing decreased WB on L LE   Knee ROM Comment minimal pain with ROM   Planned Therapy Interventions   Planned Therapy Interventions ADL retraining;balance training;bed mobility training;gait training;joint mobilization;manual therapy;motor coordination training;neuromuscular re-education;ROM;strengthening;stretching;transfer training   Planned Modality Interventions   Planned Modality  Interventions Cryotherapy;Electrical stimulation;Hot packs;TENS;Ultrasound   Planned Modality Interventions Comments only as needed   Clinical Impression   Criteria for Skilled Therapeutic Interventions Met yes, treatment indicated   PT Diagnosis L knee pain s/p TKA   Influenced by the following impairments decreased ROM, decreased strength, impaired gait, impaired transfers, pain   Functional limitations due to impairments walking, standing, transfers, ADLS   Clinical Presentation Stable/Uncomplicated   Clinical Presentation Rationale ROM, strength, high motivaiton, PLOF, LEFS, clinical judgement   Clinical Decision Making (Complexity) Low complexity   Therapy Frequency 2 times/Week   Predicted Duration of Therapy Intervention (days/wks) 8 weeks   Risk & Benefits of therapy have been explained Yes   Patient, Family & other staff in agreement with plan of care Yes   Clinical Impression Comments Pt is a 76 y.o. female who presented to the PT clinic today with a rehab diagnosis of L knee pain s/p TKA as evidenced by decreased ROM, decreased strength, impaired gait, impaired transfers, and pain. Pt appropriate for skilled PT to address previously listed impairments in order to decrease difficulty with walking, standing, transfers, and ADLs    Education Assessment   Preferred Learning Style Listening;Reading;Demonstration;Pictures/video   Barriers to Learning No barriers   ORTHO GOALS   PT Ortho Eval Goals 1;2;3;4   Ortho Goal 1   Goal Identifier 1   Goal Description Pt will achieve 0* L knee extension in order to decrease difficulty with amb.    Target Date 01/25/22   Ortho Goal 2   Goal Identifier 2   Goal Description Pt will achieve 120* L knee flexion in order to decrease difficulty with transfers and stairs.    Target Date 02/08/22   Ortho Goal 3   Goal Identifier 3   Goal Description Pt will demonstrate 5/5 L knee strength in order to decrease difficulty with ADLs.    Target Date 03/11/22   Ortho Goal 4   Goal  Identifier 4   Goal Description Pt will be independent with HEP in order to self manage symptoms.   Target Date 03/11/22   Total Evaluation Time   PT Eval, Low Complexity Minutes (88815) 10   Therapy Certification   Certification date from 01/11/22   Certification date to 03/25/21   Medical Diagnosis knee pain; osteoarthritis       Please contact me with any questions or concerns.    Thank you for your referral,     Beronica Meehan, PT, DPT  Physical Therapist  [address] [address] [phone]

## 2022-01-14 ENCOUNTER — HOSPITAL ENCOUNTER (OUTPATIENT)
Dept: PHYSICAL THERAPY | Facility: CLINIC | Age: 77
Setting detail: THERAPIES SERIES
End: 2022-01-14
Attending: ORTHOPAEDIC SURGERY
Payer: COMMERCIAL

## 2022-01-14 PROCEDURE — 97140 MANUAL THERAPY 1/> REGIONS: CPT | Mod: GP | Performed by: PHYSICAL MEDICINE & REHABILITATION

## 2022-01-14 PROCEDURE — 97110 THERAPEUTIC EXERCISES: CPT | Mod: GP | Performed by: PHYSICAL MEDICINE & REHABILITATION

## 2022-01-18 ENCOUNTER — HOSPITAL ENCOUNTER (OUTPATIENT)
Dept: PHYSICAL THERAPY | Facility: CLINIC | Age: 77
Setting detail: THERAPIES SERIES
End: 2022-01-18
Attending: ORTHOPAEDIC SURGERY
Payer: COMMERCIAL

## 2022-01-18 PROCEDURE — 97110 THERAPEUTIC EXERCISES: CPT | Mod: GP | Performed by: PHYSICAL MEDICINE & REHABILITATION

## 2022-01-21 ENCOUNTER — HOSPITAL ENCOUNTER (OUTPATIENT)
Dept: PHYSICAL THERAPY | Facility: CLINIC | Age: 77
Setting detail: THERAPIES SERIES
End: 2022-01-21
Attending: ORTHOPAEDIC SURGERY
Payer: COMMERCIAL

## 2022-01-21 PROCEDURE — 97110 THERAPEUTIC EXERCISES: CPT | Mod: GP | Performed by: PHYSICAL MEDICINE & REHABILITATION

## 2022-01-27 ENCOUNTER — HOSPITAL ENCOUNTER (OUTPATIENT)
Dept: PHYSICAL THERAPY | Facility: CLINIC | Age: 77
Setting detail: THERAPIES SERIES
End: 2022-01-27
Attending: ORTHOPAEDIC SURGERY
Payer: COMMERCIAL

## 2022-01-27 PROCEDURE — 97110 THERAPEUTIC EXERCISES: CPT | Mod: GP | Performed by: PHYSICAL MEDICINE & REHABILITATION

## 2022-01-27 NOTE — PROGRESS NOTES
"[facility]    Outpatient Physical Therapy Discharge Note  Patient: Gail Dewitt  : 1945    Beginning/End Dates of Reporting Period:  22 to 22    Referring Provider: Quinton Cleary MD    Therapy Diagnosis: L knee pain s/p TKA     Client Self Report: Pt reports knee feels \"almost back to normal\" but feels the only thing that is still challenging is getting up from a teixeira/low seat at a restaurant.  Notes she feels she can continue to work on this at home.    Objective Measurements:  Objective Measure: L knee ROM  Details: extension: 0*, flexion: 120*.   Objective Measure: L knee strength  Details: 5/5 flexion and extension    Outcome Measures (most recent score):  LEFS: 48/80 (32/80 at initial eval)    Goals:  Goal Identifier 1   Goal Description Pt will achieve 0* L knee extension in order to decrease difficulty with amb.    Target Date 22   Date Met  22   Progress (detail required for progress note):       Goal Identifier 2   Goal Description Pt will achieve 120* L knee flexion in order to decrease difficulty with transfers and stairs.    Target Date 22   Date Met  22   Progress (detail required for progress note):       Goal Identifier 3   Goal Description Pt will demonstrate 5/5 L knee strength in order to decrease difficulty with ADLs.    Target Date 22   Date Met  22   Progress (detail required for progress note):       Goal Identifier 4   Goal Description Pt will be independent with HEP in order to self manage symptoms.   Target Date 22   Date Met  22   Progress (detail required for progress note):       Progress toward goals: Pt attended 5 PT sessions, achieving 4/4 short term and long term goals. Pt now has full ROM and strength of L knee, without pain. Pt improved LEFS by 16 points since eval, and has returned to PLOF. Pt is appropriate " for D/C at this time as she has met all goals and is independent with HEP.     Plan:  Discharge from therapy.    Discharge:    Reason for Discharge: Patient has met all goals.  Patient chooses to discontinue therapy.    Equipment Issued: therabands    Discharge Plan: Patient to continue home program.    Please contact me with any questions or concerns.    Thank you for your referral,     Beronica Meehan, PT, DPT  Physical Therapist  [address] [address] [phone]

## 2022-02-17 PROBLEM — Z71.89 ADVANCED DIRECTIVES, COUNSELING/DISCUSSION: Status: RESOLVED | Noted: 2017-10-02 | Resolved: 2021-12-17

## 2022-03-16 ENCOUNTER — TRANSFERRED RECORDS (OUTPATIENT)
Dept: HEALTH INFORMATION MANAGEMENT | Facility: CLINIC | Age: 77
End: 2022-03-16
Payer: COMMERCIAL

## 2022-03-18 DIAGNOSIS — Z11.59 ENCOUNTER FOR SCREENING FOR OTHER VIRAL DISEASES: Primary | ICD-10-CM

## 2022-04-13 ENCOUNTER — LAB (OUTPATIENT)
Dept: LAB | Facility: CLINIC | Age: 77
End: 2022-04-13
Attending: ORTHOPAEDIC SURGERY
Payer: COMMERCIAL

## 2022-04-13 DIAGNOSIS — Z11.59 ENCOUNTER FOR SCREENING FOR OTHER VIRAL DISEASES: ICD-10-CM

## 2022-04-13 PROCEDURE — U0005 INFEC AGEN DETEC AMPLI PROBE: HCPCS

## 2022-04-13 PROCEDURE — U0003 INFECTIOUS AGENT DETECTION BY NUCLEIC ACID (DNA OR RNA); SEVERE ACUTE RESPIRATORY SYNDROME CORONAVIRUS 2 (SARS-COV-2) (CORONAVIRUS DISEASE [COVID-19]), AMPLIFIED PROBE TECHNIQUE, MAKING USE OF HIGH THROUGHPUT TECHNOLOGIES AS DESCRIBED BY CMS-2020-01-R: HCPCS

## 2022-04-14 ENCOUNTER — ANESTHESIA EVENT (OUTPATIENT)
Dept: SURGERY | Facility: CLINIC | Age: 77
End: 2022-04-14
Payer: COMMERCIAL

## 2022-04-14 LAB — SARS-COV-2 RNA RESP QL NAA+PROBE: NEGATIVE

## 2022-04-14 ASSESSMENT — LIFESTYLE VARIABLES: TOBACCO_USE: 1

## 2022-04-14 NOTE — ANESTHESIA PREPROCEDURE EVALUATION
Anesthesia Pre-Procedure Evaluation    Patient: Gail Dewitt   MRN: [id number] : 1945        Procedure : Procedure(s):  Total ankle arthroplasty  Akin osteotomy  Second toe hammertoe correction  Hardware removal foot          Past Medical History:   Diagnosis Date     Arthritis      Dyslipidemia      Eczema      Female bladder prolapse      Need for prophylactic hormone replacement therapy (postmenopausal)      Obesity      Unspecified essential hypertension      Unspecified hypothyroidism      Urge incontinence       Past Surgical History:   Procedure Laterality Date     APPENDECTOMY       ARTHROPLASTY KNEE Left 2021    Procedure: LEFT TOTAL KNEE ARTHROPLASTY;  Surgeon: Quinton Cleary MD;  Location: [facility] OR     BREAST BIOPSY, RT/LT Right 1970     COLPORRHAPHY ANTERIOR  2010    COLPORRHAPHY ANTERIOR performed by MAY RIVERA at [facility] OR     CYSTOCELE REPAIR       ECTOPIC PREGNANCY SURGERY  1969     EGD       FOOT SURGERY Bilateral     Subtalar foot fusion and bunion surgery     HYSTERECTOMY, JOSEE      Hysterectomy     INJECT EPIDURAL LUMBAR  10/19/2011    Procedure:INJECT EPIDURAL LUMBAR; DESHAWN-; Surgeon:GENERIC ANESTHESIA PROVIDER; Location:[facility] OR     INJECT JOINT SACROILIAC  2011    INJECT JOINT SACROILIAC performed by GENERIC ANESTHESIA PROVIDER at [facility] OR     INJECT JOINT SACROILIAC  2012    Procedure:INJECT JOINT SACROILIAC; DESHAWN SI Joint --; Surgeon:GENERIC ANESTHESIA PROVIDER; Location:[facility] OR     OOPHORECTOMY       PHACOEMULSIFICATION WITH STANDARD INTRAOCULAR LENS IMPLANT  2011    Procedure:PHACOEMULSIFICATION WITH STANDARD INTRAOCULAR LENS IMPLANT; Surgeon:JOSÉ MANUEL DORSEY; Location:[facility] OR     PHACOEMULSIFICATION WITH STANDARD INTRAOCULAR LENS IMPLANT  2011    Procedure:PHACOEMULSIFICATION WITH STANDARD INTRAOCULAR LENS IMPLANT; Surgeon:JOSÉ MANUEL DORSEY; Location:[facility] OR     RECTOCELE REPAIR  2007     TONSILLECTOMY         Allergies   Allergen Reactions     Dilaudid [Hydromorphone Hcl] Hives     Ace Inhibitors Cough     Amlodipine Besylate [Amlodipine] Swelling     Hydromorphone      Percocet [Oxycodone-Acetaminophen] Other (See Comments)     Severe dizziness        Social History     Tobacco Use     Smoking status: Former Smoker     Years: 20.00     Quit date: 1985     Years since quittin.5     Smokeless tobacco: Never Used   Substance Use Topics     Alcohol use: Not Currently      Wt Readings from Last 1 Encounters:   21 97.5 kg (215 lb)        Anesthesia Evaluation   Pt has had prior anesthetic. Type: General and MAC.    No history of anesthetic complications       ROS/MED HX  ENT/Pulmonary:     (+) tobacco use, Past use,     Neurologic:  - neg neurologic ROS     Cardiovascular:  - neg cardiovascular ROS   (+) Dyslipidemia hypertension-----    METS/Exercise Tolerance: >4 METS    Hematologic:  - neg hematologic  ROS     Musculoskeletal:   (+) arthritis,     GI/Hepatic:     (+) GERD, Asymptomatic on medication,     Renal/Genitourinary:       Endo:     (+) thyroid problem, hypothyroidism, Obesity,     Psychiatric/Substance Use:  - neg psychiatric ROS     Infectious Disease:       Malignancy:  - neg malignancy ROS     Other:  - neg other ROS          Physical Exam    Airway  airway exam normal      Mallampati: II   TM distance: > 3 FB   Neck ROM: full   Mouth opening: > 3 cm    Respiratory Devices and Support         Dental       (+) upper dentures and lower dentures      Cardiovascular   cardiovascular exam normal       Rhythm and rate: regular and normal     Pulmonary   pulmonary exam normal        breath sounds clear to auscultation           OUTSIDE LABS:  CBC:   Lab Results   Component Value Date    WBC 6.1 2021    WBC 5.6 2021    HGB 9.2 (L) 12/15/2021    HGB 10.1 (L) 2021    HCT 35.6 2021    HCT 37.5 2021     2021     2021     BMP:   Lab Results    Component Value Date     12/14/2021     08/04/2021    POTASSIUM 3.9 12/14/2021    POTASSIUM 4.1 12/13/2021    CHLORIDE 103 12/14/2021    CHLORIDE 105 08/04/2021    CO2 25 12/14/2021    CO2 27 08/04/2021    BUN 13 12/14/2021    BUN 17 08/04/2021    CR 0.71 12/14/2021    CR 0.65 12/13/2021    GLC 93 12/15/2021     (H) 12/14/2021     COAGS:   Lab Results   Component Value Date    INR 1.04 12/13/2021     POC: No results found for: BGM, HCG, HCGS  HEPATIC:   Lab Results   Component Value Date    ALBUMIN 3.4 08/04/2021    PROTTOTAL 7.1 08/04/2021    ALT 18 08/04/2021    AST 18 08/04/2021     (H) 11/21/2017    ALKPHOS 129 08/04/2021    BILITOTAL 0.5 08/04/2021     OTHER:   Lab Results   Component Value Date    A1C 5.9 05/20/2014    CELINA 8.0 (L) 12/14/2021    LIPASE 172 07/10/2016    AMYLASE 82 12/09/2009    TSH 2.50 08/04/2021    T4 1.33 12/19/2017    CRP 4.5 03/28/2016    SED 24 03/28/2016       Anesthesia Plan    ASA Status:  2   NPO Status:  NPO Appropriate    Anesthesia Type: Spinal.   Induction: Propofol.   Maintenance: TIVA.        Consents    Anesthesia Plan(s) and associated risks, benefits, and realistic alternatives discussed. Questions answered and patient/representative(s) expressed understanding.     - Discussed: Risks, Benefits and Alternatives for BOTH SEDATION and the PROCEDURE were discussed     - Discussed with:  Patient      - Extended Intubation/Ventilatory Support Discussed: No.      - Patient is DNR/DNI Status: No    Use of blood products discussed: No .     Postoperative Care    Pain management: IV analgesics, Oral pain medications, Peripheral nerve block (Single Shot).   PONV prophylaxis: Ondansetron (or other 5HT-3), Dexamethasone or Solumedrol, Background Propofol Infusion     Comments:    Other Comments: Left Femoral/sciatic (popliteal) nerve block for post-op pain control.       H&P reviewed: Unable to attach H&P to encounter due to EHR limitations. H&P Update:  appropriate H&P reviewed, patient examined. No interval changes since H&P (within 30 days).         Preston Pappas, CRNA, APRN CRNA

## 2022-04-15 ENCOUNTER — ANESTHESIA (OUTPATIENT)
Dept: SURGERY | Facility: CLINIC | Age: 77
End: 2022-04-15
Payer: COMMERCIAL

## 2022-04-15 ENCOUNTER — HOSPITAL ENCOUNTER (OUTPATIENT)
Facility: CLINIC | Age: 77
Discharge: HOME | End: 2022-04-16
Attending: PODIATRIST | Admitting: PHYSICIAN ASSISTANT
Payer: COMMERCIAL

## 2022-04-15 ENCOUNTER — APPOINTMENT (OUTPATIENT)
Dept: GENERAL RADIOLOGY | Facility: CLINIC | Age: 77
End: 2022-04-15
Attending: ORTHOPAEDIC SURGERY
Payer: COMMERCIAL

## 2022-04-15 DIAGNOSIS — Z96.662 HISTORY OF ARTHROPLASTY OF LEFT ANKLE: Primary | ICD-10-CM

## 2022-04-15 DIAGNOSIS — Z96.652 HISTORY OF TOTAL KNEE REPLACEMENT, LEFT: ICD-10-CM

## 2022-04-15 PROCEDURE — 250N000013 HC RX MED GY IP 250 OP 250 PS 637: Performed by: NURSE ANESTHETIST, CERTIFIED REGISTERED

## 2022-04-15 PROCEDURE — 370N000017 HC ANESTHESIA TECHNICAL FEE, PER MIN: Performed by: ORTHOPAEDIC SURGERY

## 2022-04-15 PROCEDURE — 250N000009 HC RX 250: Performed by: NURSE ANESTHETIST, CERTIFIED REGISTERED

## 2022-04-15 PROCEDURE — 999N000141 HC STATISTIC PRE-PROCEDURE NURSING ASSESSMENT: Performed by: ORTHOPAEDIC SURGERY

## 2022-04-15 PROCEDURE — 250N000013 HC RX MED GY IP 250 OP 250 PS 637: Performed by: PHYSICIAN ASSISTANT

## 2022-04-15 PROCEDURE — 272N000001 HC OR GENERAL SUPPLY STERILE: Performed by: ORTHOPAEDIC SURGERY

## 2022-04-15 PROCEDURE — 250N000011 HC RX IP 250 OP 636: Performed by: ORTHOPAEDIC SURGERY

## 2022-04-15 PROCEDURE — 250N000011 HC RX IP 250 OP 636: Performed by: NURSE ANESTHETIST, CERTIFIED REGISTERED

## 2022-04-15 PROCEDURE — C1713 ANCHOR/SCREW BN/BN,TIS/BN: HCPCS | Performed by: ORTHOPAEDIC SURGERY

## 2022-04-15 PROCEDURE — 258N000003 HC RX IP 258 OP 636: Performed by: PHYSICIAN ASSISTANT

## 2022-04-15 PROCEDURE — 250N000011 HC RX IP 250 OP 636: Performed by: PHYSICIAN ASSISTANT

## 2022-04-15 PROCEDURE — 710N000009 HC RECOVERY PHASE 1, LEVEL 1, PER MIN: Performed by: ORTHOPAEDIC SURGERY

## 2022-04-15 PROCEDURE — C1776 JOINT DEVICE (IMPLANTABLE): HCPCS | Performed by: ORTHOPAEDIC SURGERY

## 2022-04-15 PROCEDURE — 360N000084 HC SURGERY LEVEL 4 W/ FLUORO, PER MIN: Performed by: ORTHOPAEDIC SURGERY

## 2022-04-15 PROCEDURE — 258N000003 HC RX IP 258 OP 636: Performed by: NURSE ANESTHETIST, CERTIFIED REGISTERED

## 2022-04-15 PROCEDURE — 999N000179 XR SURGERY CARM FLUORO LESS THAN 5 MIN W STILLS: Mod: TC

## 2022-04-15 DEVICE — IMPLANTABLE DEVICE: Type: IMPLANTABLE DEVICE | Site: ANKLE | Status: FUNCTIONAL

## 2022-04-15 DEVICE — IMP BASEPLATE TIBIAL ANKLE XL SIZE 1 SALTO LJU991T: Type: IMPLANTABLE DEVICE | Site: ANKLE | Status: FUNCTIONAL

## 2022-04-15 DEVICE — IMPLANTABLE DEVICE: Type: IMPLANTABLE DEVICE | Site: FOOT | Status: FUNCTIONAL

## 2022-04-15 DEVICE — INSERT TALUS 8MM 1 ANKL LT UHMWPE LJU245T: Type: IMPLANTABLE DEVICE | Site: ANKLE | Status: FUNCTIONAL

## 2022-04-15 RX ORDER — DEXAMETHASONE SODIUM PHOSPHATE 10 MG/ML
INJECTION, SOLUTION INTRAMUSCULAR; INTRAVENOUS
Status: COMPLETED | OUTPATIENT
Start: 2022-04-15 | End: 2022-04-15

## 2022-04-15 RX ORDER — KETOROLAC TROMETHAMINE 15 MG/ML
15 INJECTION, SOLUTION INTRAMUSCULAR; INTRAVENOUS
Status: COMPLETED | OUTPATIENT
Start: 2022-04-15 | End: 2022-04-15

## 2022-04-15 RX ORDER — AMOXICILLIN 250 MG
1 CAPSULE ORAL 2 TIMES DAILY
Status: DISCONTINUED | OUTPATIENT
Start: 2022-04-15 | End: 2022-04-16 | Stop reason: HOSPADM

## 2022-04-15 RX ORDER — CEFAZOLIN SODIUM/WATER 2 G/20 ML
2 SYRINGE (ML) INTRAVENOUS SEE ADMIN INSTRUCTIONS
Status: DISCONTINUED | OUTPATIENT
Start: 2022-04-15 | End: 2022-04-15 | Stop reason: HOSPADM

## 2022-04-15 RX ORDER — CEFAZOLIN SODIUM 2 G/100ML
2 INJECTION, SOLUTION INTRAVENOUS EVERY 8 HOURS
Status: COMPLETED | OUTPATIENT
Start: 2022-04-15 | End: 2022-04-16

## 2022-04-15 RX ORDER — GABAPENTIN 100 MG/1
100 CAPSULE ORAL
Status: COMPLETED | OUTPATIENT
Start: 2022-04-15 | End: 2022-04-15

## 2022-04-15 RX ORDER — HYDROXYZINE HYDROCHLORIDE 10 MG/1
10 TABLET, FILM COATED ORAL EVERY 6 HOURS PRN
Status: DISCONTINUED | OUTPATIENT
Start: 2022-04-15 | End: 2022-04-15 | Stop reason: HOSPADM

## 2022-04-15 RX ORDER — BISACODYL 10 MG
10 SUPPOSITORY, RECTAL RECTAL DAILY PRN
Status: DISCONTINUED | OUTPATIENT
Start: 2022-04-15 | End: 2022-04-16 | Stop reason: HOSPADM

## 2022-04-15 RX ORDER — BUPIVACAINE HYDROCHLORIDE 7.5 MG/ML
INJECTION, SOLUTION INTRASPINAL PRN
Status: DISCONTINUED | OUTPATIENT
Start: 2022-04-15 | End: 2022-04-15

## 2022-04-15 RX ORDER — ONDANSETRON 2 MG/ML
4 INJECTION INTRAMUSCULAR; INTRAVENOUS EVERY 30 MIN PRN
Status: DISCONTINUED | OUTPATIENT
Start: 2022-04-15 | End: 2022-04-15 | Stop reason: HOSPADM

## 2022-04-15 RX ORDER — SODIUM CHLORIDE, SODIUM LACTATE, POTASSIUM CHLORIDE, CALCIUM CHLORIDE 600; 310; 30; 20 MG/100ML; MG/100ML; MG/100ML; MG/100ML
INJECTION, SOLUTION INTRAVENOUS CONTINUOUS
Status: DISCONTINUED | OUTPATIENT
Start: 2022-04-15 | End: 2022-04-16 | Stop reason: HOSPADM

## 2022-04-15 RX ORDER — HYDROCODONE BITARTRATE AND ACETAMINOPHEN 5; 325 MG/1; MG/1
1 TABLET ORAL EVERY 6 HOURS PRN
Status: DISCONTINUED | OUTPATIENT
Start: 2022-04-15 | End: 2022-04-15

## 2022-04-15 RX ORDER — NALOXONE HYDROCHLORIDE 0.4 MG/ML
0.4 INJECTION, SOLUTION INTRAMUSCULAR; INTRAVENOUS; SUBCUTANEOUS
Status: DISCONTINUED | OUTPATIENT
Start: 2022-04-15 | End: 2022-04-16 | Stop reason: HOSPADM

## 2022-04-15 RX ORDER — CELECOXIB 100 MG/1
100 CAPSULE ORAL 2 TIMES DAILY
Status: DISCONTINUED | OUTPATIENT
Start: 2022-04-15 | End: 2022-04-16 | Stop reason: HOSPADM

## 2022-04-15 RX ORDER — LIDOCAINE 40 MG/G
CREAM TOPICAL
Status: DISCONTINUED | OUTPATIENT
Start: 2022-04-15 | End: 2022-04-15 | Stop reason: HOSPADM

## 2022-04-15 RX ORDER — NALOXONE HYDROCHLORIDE 0.4 MG/ML
0.2 INJECTION, SOLUTION INTRAMUSCULAR; INTRAVENOUS; SUBCUTANEOUS
Status: DISCONTINUED | OUTPATIENT
Start: 2022-04-15 | End: 2022-04-16 | Stop reason: HOSPADM

## 2022-04-15 RX ORDER — KETAMINE HYDROCHLORIDE 10 MG/ML
INJECTION, SOLUTION INTRAMUSCULAR; INTRAVENOUS PRN
Status: DISCONTINUED | OUTPATIENT
Start: 2022-04-15 | End: 2022-04-15

## 2022-04-15 RX ORDER — PROCHLORPERAZINE MALEATE 5 MG
5 TABLET ORAL EVERY 6 HOURS PRN
Status: DISCONTINUED | OUTPATIENT
Start: 2022-04-15 | End: 2022-04-16 | Stop reason: HOSPADM

## 2022-04-15 RX ORDER — HYDROXYZINE HYDROCHLORIDE 10 MG/1
10 TABLET, FILM COATED ORAL EVERY 6 HOURS PRN
Status: DISCONTINUED | OUTPATIENT
Start: 2022-04-15 | End: 2022-04-16 | Stop reason: HOSPADM

## 2022-04-15 RX ORDER — ONDANSETRON 2 MG/ML
4 INJECTION INTRAMUSCULAR; INTRAVENOUS EVERY 6 HOURS PRN
Status: DISCONTINUED | OUTPATIENT
Start: 2022-04-15 | End: 2022-04-16 | Stop reason: HOSPADM

## 2022-04-15 RX ORDER — ACETAMINOPHEN 325 MG/1
650 TABLET ORAL EVERY 4 HOURS PRN
Status: DISCONTINUED | OUTPATIENT
Start: 2022-04-18 | End: 2022-04-16 | Stop reason: HOSPADM

## 2022-04-15 RX ORDER — ROPIVACAINE HYDROCHLORIDE 5 MG/ML
INJECTION, SOLUTION EPIDURAL; INFILTRATION; PERINEURAL
Status: COMPLETED | OUTPATIENT
Start: 2022-04-15 | End: 2022-04-15

## 2022-04-15 RX ORDER — SODIUM CHLORIDE, SODIUM LACTATE, POTASSIUM CHLORIDE, CALCIUM CHLORIDE 600; 310; 30; 20 MG/100ML; MG/100ML; MG/100ML; MG/100ML
INJECTION, SOLUTION INTRAVENOUS CONTINUOUS
Status: DISCONTINUED | OUTPATIENT
Start: 2022-04-15 | End: 2022-04-15 | Stop reason: HOSPADM

## 2022-04-15 RX ORDER — FENTANYL CITRATE 50 UG/ML
25 INJECTION, SOLUTION INTRAMUSCULAR; INTRAVENOUS EVERY 5 MIN PRN
Status: DISCONTINUED | OUTPATIENT
Start: 2022-04-15 | End: 2022-04-15 | Stop reason: HOSPADM

## 2022-04-15 RX ORDER — CEFAZOLIN SODIUM/WATER 2 G/20 ML
2 SYRINGE (ML) INTRAVENOUS
Status: COMPLETED | OUTPATIENT
Start: 2022-04-15 | End: 2022-04-15

## 2022-04-15 RX ORDER — EPINEPHRINE 1 MG/ML
INJECTION, SOLUTION, CONCENTRATE INTRAVENOUS PRN
Status: DISCONTINUED | OUTPATIENT
Start: 2022-04-15 | End: 2022-04-15

## 2022-04-15 RX ORDER — FENTANYL CITRATE-0.9 % NACL/PF 10 MCG/ML
100 PLASTIC BAG, INJECTION (ML) INTRAVENOUS
Status: DISCONTINUED | OUTPATIENT
Start: 2022-04-15 | End: 2022-04-15 | Stop reason: HOSPADM

## 2022-04-15 RX ORDER — HYDROCODONE BITARTRATE AND ACETAMINOPHEN 5; 325 MG/1; MG/1
1-2 TABLET ORAL EVERY 6 HOURS PRN
Status: DISCONTINUED | OUTPATIENT
Start: 2022-04-15 | End: 2022-04-16 | Stop reason: HOSPADM

## 2022-04-15 RX ORDER — ACETAMINOPHEN 325 MG/1
975 TABLET ORAL ONCE
Status: COMPLETED | OUTPATIENT
Start: 2022-04-15 | End: 2022-04-15

## 2022-04-15 RX ORDER — LIDOCAINE 40 MG/G
CREAM TOPICAL
Status: DISCONTINUED | OUTPATIENT
Start: 2022-04-15 | End: 2022-04-16 | Stop reason: HOSPADM

## 2022-04-15 RX ORDER — PROPOFOL 10 MG/ML
INJECTION, EMULSION INTRAVENOUS CONTINUOUS PRN
Status: DISCONTINUED | OUTPATIENT
Start: 2022-04-15 | End: 2022-04-15

## 2022-04-15 RX ORDER — MAGNESIUM SULFATE HEPTAHYDRATE 40 MG/ML
2 INJECTION, SOLUTION INTRAVENOUS ONCE
Status: COMPLETED | OUTPATIENT
Start: 2022-04-15 | End: 2022-04-15

## 2022-04-15 RX ORDER — LIDOCAINE HYDROCHLORIDE 10 MG/ML
INJECTION, SOLUTION EPIDURAL; INFILTRATION; INTRACAUDAL; PERINEURAL PRN
Status: DISCONTINUED | OUTPATIENT
Start: 2022-04-15 | End: 2022-04-15

## 2022-04-15 RX ORDER — POLYETHYLENE GLYCOL 3350 17 G/17G
17 POWDER, FOR SOLUTION ORAL DAILY
Status: DISCONTINUED | OUTPATIENT
Start: 2022-04-16 | End: 2022-04-16 | Stop reason: HOSPADM

## 2022-04-15 RX ORDER — ACETAMINOPHEN 325 MG/1
975 TABLET ORAL EVERY 8 HOURS
Status: DISCONTINUED | OUTPATIENT
Start: 2022-04-15 | End: 2022-04-15

## 2022-04-15 RX ORDER — VANCOMYCIN HYDROCHLORIDE 1 G/20ML
1 INJECTION, POWDER, LYOPHILIZED, FOR SOLUTION INTRAVENOUS ONCE
Status: DISCONTINUED | OUTPATIENT
Start: 2022-04-15 | End: 2022-04-15 | Stop reason: HOSPADM

## 2022-04-15 RX ORDER — VANCOMYCIN HYDROCHLORIDE 1 G/20ML
INJECTION, POWDER, LYOPHILIZED, FOR SOLUTION INTRAVENOUS PRN
Status: DISCONTINUED | OUTPATIENT
Start: 2022-04-15 | End: 2022-04-15 | Stop reason: HOSPADM

## 2022-04-15 RX ORDER — ONDANSETRON 4 MG/1
4 TABLET, ORALLY DISINTEGRATING ORAL EVERY 30 MIN PRN
Status: DISCONTINUED | OUTPATIENT
Start: 2022-04-15 | End: 2022-04-15 | Stop reason: HOSPADM

## 2022-04-15 RX ORDER — ONDANSETRON 4 MG/1
4 TABLET, ORALLY DISINTEGRATING ORAL EVERY 6 HOURS PRN
Status: DISCONTINUED | OUTPATIENT
Start: 2022-04-15 | End: 2022-04-16 | Stop reason: HOSPADM

## 2022-04-15 RX ADMIN — SODIUM CHLORIDE, POTASSIUM CHLORIDE, SODIUM LACTATE AND CALCIUM CHLORIDE: 600; 310; 30; 20 INJECTION, SOLUTION INTRAVENOUS at 17:20

## 2022-04-15 RX ADMIN — KETOROLAC TROMETHAMINE 15 MG: 15 INJECTION, SOLUTION INTRAMUSCULAR; INTRAVENOUS at 13:08

## 2022-04-15 RX ADMIN — BUPIVACAINE HYDROCHLORIDE IN DEXTROSE 1.6 ML: 7.5 INJECTION, SOLUTION SUBARACHNOID at 10:18

## 2022-04-15 RX ADMIN — PHENYLEPHRINE HYDROCHLORIDE 100 MCG: 10 INJECTION INTRAVENOUS at 11:30

## 2022-04-15 RX ADMIN — MAGNESIUM SULFATE HEPTAHYDRATE 2 G: 40 INJECTION, SOLUTION INTRAVENOUS at 10:25

## 2022-04-15 RX ADMIN — Medication 2 G: at 10:06

## 2022-04-15 RX ADMIN — MIDAZOLAM 2 MG: 1 INJECTION INTRAMUSCULAR; INTRAVENOUS at 10:11

## 2022-04-15 RX ADMIN — SENNOSIDES AND DOCUSATE SODIUM 1 TABLET: 50; 8.6 TABLET ORAL at 19:40

## 2022-04-15 RX ADMIN — CEFAZOLIN SODIUM 2 G: 2 INJECTION, SOLUTION INTRAVENOUS at 18:46

## 2022-04-15 RX ADMIN — ROPIVACAINE HYDROCHLORIDE 20 ML: 5 INJECTION, SOLUTION EPIDURAL; INFILTRATION; PERINEURAL at 09:35

## 2022-04-15 RX ADMIN — HYDROXYZINE HYDROCHLORIDE 10 MG: 10 TABLET ORAL at 13:10

## 2022-04-15 RX ADMIN — SODIUM CHLORIDE, POTASSIUM CHLORIDE, SODIUM LACTATE AND CALCIUM CHLORIDE: 600; 310; 30; 20 INJECTION, SOLUTION INTRAVENOUS at 16:53

## 2022-04-15 RX ADMIN — PHENYLEPHRINE HYDROCHLORIDE 100 MCG: 10 INJECTION INTRAVENOUS at 11:23

## 2022-04-15 RX ADMIN — DEXAMETHASONE SODIUM PHOSPHATE 4 MG: 10 INJECTION, SOLUTION INTRAMUSCULAR; INTRAVENOUS at 09:35

## 2022-04-15 RX ADMIN — PHENYLEPHRINE HYDROCHLORIDE 100 MCG: 10 INJECTION INTRAVENOUS at 11:33

## 2022-04-15 RX ADMIN — ROPIVACAINE HYDROCHLORIDE 10 ML: 5 INJECTION, SOLUTION EPIDURAL; INFILTRATION; PERINEURAL at 09:40

## 2022-04-15 RX ADMIN — PHENYLEPHRINE HYDROCHLORIDE 200 MCG: 10 INJECTION INTRAVENOUS at 10:36

## 2022-04-15 RX ADMIN — PHENYLEPHRINE HYDROCHLORIDE 100 MCG: 10 INJECTION INTRAVENOUS at 10:52

## 2022-04-15 RX ADMIN — ACETAMINOPHEN 975 MG: 325 TABLET, FILM COATED ORAL at 08:18

## 2022-04-15 RX ADMIN — LIDOCAINE HYDROCHLORIDE 30 MG: 10 INJECTION, SOLUTION EPIDURAL; INFILTRATION; INTRACAUDAL; PERINEURAL at 10:18

## 2022-04-15 RX ADMIN — PHENYLEPHRINE HYDROCHLORIDE 200 MCG: 10 INJECTION INTRAVENOUS at 10:48

## 2022-04-15 RX ADMIN — PHENYLEPHRINE HYDROCHLORIDE 100 MCG: 10 INJECTION INTRAVENOUS at 11:26

## 2022-04-15 RX ADMIN — KETAMINE HYDROCHLORIDE 30 MG: 10 INJECTION INTRAMUSCULAR; INTRAVENOUS at 10:13

## 2022-04-15 RX ADMIN — HYDROCODONE BITARTRATE AND ACETAMINOPHEN 1 TABLET: 5; 325 TABLET ORAL at 15:23

## 2022-04-15 RX ADMIN — CELECOXIB 100 MG: 100 CAPSULE ORAL at 19:40

## 2022-04-15 RX ADMIN — EPINEPHRINE 0.2 MG: 1 INJECTION, SOLUTION INTRAMUSCULAR; SUBCUTANEOUS at 10:18

## 2022-04-15 RX ADMIN — HYDROCODONE BITARTRATE AND ACETAMINOPHEN 1 TABLET: 5; 325 TABLET ORAL at 14:45

## 2022-04-15 RX ADMIN — PROPOFOL 50 MCG/KG/MIN: 10 INJECTION, EMULSION INTRAVENOUS at 10:20

## 2022-04-15 RX ADMIN — DEXMEDETOMIDINE HCL 40 MCG: 100 INJECTION INTRAVENOUS at 09:35

## 2022-04-15 RX ADMIN — PHENYLEPHRINE HYDROCHLORIDE 100 MCG: 10 INJECTION INTRAVENOUS at 11:03

## 2022-04-15 RX ADMIN — MIDAZOLAM 2 MG: 1 INJECTION INTRAMUSCULAR; INTRAVENOUS at 09:11

## 2022-04-15 RX ADMIN — PHENYLEPHRINE HYDROCHLORIDE 100 MCG: 10 INJECTION INTRAVENOUS at 11:05

## 2022-04-15 RX ADMIN — GABAPENTIN 100 MG: 100 CAPSULE ORAL at 08:18

## 2022-04-15 RX ADMIN — SODIUM CHLORIDE, POTASSIUM CHLORIDE, SODIUM LACTATE AND CALCIUM CHLORIDE: 600; 310; 30; 20 INJECTION, SOLUTION INTRAVENOUS at 10:11

## 2022-04-15 RX ADMIN — PHENYLEPHRINE HYDROCHLORIDE 100 MCG: 10 INJECTION INTRAVENOUS at 11:07

## 2022-04-15 RX ADMIN — KETAMINE HYDROCHLORIDE 20 MG: 10 INJECTION INTRAMUSCULAR; INTRAVENOUS at 10:21

## 2022-04-15 RX ADMIN — HYDROCODONE BITARTRATE AND ACETAMINOPHEN 2 TABLET: 5; 325 TABLET ORAL at 21:08

## 2022-04-15 RX ADMIN — PHENYLEPHRINE HYDROCHLORIDE 100 MCG: 10 INJECTION INTRAVENOUS at 10:38

## 2022-04-15 RX ADMIN — PHENYLEPHRINE HYDROCHLORIDE 100 MCG: 10 INJECTION INTRAVENOUS at 10:40

## 2022-04-15 ASSESSMENT — ACTIVITIES OF DAILY LIVING (ADL)
DIFFICULTY_EATING/SWALLOWING: NO
TOILETING_ISSUES: NO
WEAR_GLASSES_OR_BLIND: YES
CHANGE_IN_FUNCTIONAL_STATUS_SINCE_ONSET_OF_CURRENT_ILLNESS/INJURY: YES
DRESSING/BATHING_DIFFICULTY: NO
VISION_MANAGEMENT: GLASSES
HEARING_DIFFICULTY_OR_DEAF: NO
FALL_HISTORY_WITHIN_LAST_SIX_MONTHS: NO
DOING_ERRANDS_INDEPENDENTLY_DIFFICULTY: NO
WALKING_OR_CLIMBING_STAIRS_DIFFICULTY: NO
CONCENTRATING,_REMEMBERING_OR_MAKING_DECISIONS_DIFFICULTY: NO
DIFFICULTY_COMMUNICATING: NO

## 2022-04-15 NOTE — ANESTHESIA POSTPROCEDURE EVALUATION
Patient: Gail Dewitt    Procedure: Procedure(s):  Left Total ankle arthroplasty and prophylactic screw of tibia  Akin osteotomy, left foot  Second toe hammertoe correction, left foot  Hardware removal left foot       Anesthesia Type:  Spinal    Note:  Disposition: Outpatient   Postop Pain Control: Uneventful            Sign Out: Well controlled pain   PONV: No   Neuro/Psych: Uneventful            Sign Out: Acceptable/Baseline neuro status   Airway/Respiratory: Uneventful            Sign Out: Acceptable/Baseline resp. status   CV/Hemodynamics: Uneventful            Sign Out: Acceptable CV status; No obvious hypovolemia; No obvious fluid overload   Other NRE: NONE   DID A NON-ROUTINE EVENT OCCUR? No           Last vitals:  Vitals Value Taken Time   /70 04/15/22 1315   Temp 36.8  C (98.2  F) 04/15/22 1155   Pulse 64 04/15/22 1322   Resp 25 04/15/22 1322   SpO2 97 % 04/15/22 1321   Vitals shown include unvalidated device data.    Electronically Signed By: Preston Pappas CRNA, APRN CRNA  April 15, 2022  1:54 PM

## 2022-04-15 NOTE — ANESTHESIA PROCEDURE NOTES
Femoral Procedure Note    Pre-Procedure   Staff -        CRNA: Preston Pappas APRN CRNA       Other Anesthesia Staff: Chino Perez       Performed By: LUCY       Location: pre-op       Pre-Anesthestic Checklist: patient identified, IV checked, site marked, risks and benefits discussed, informed consent, monitors and equipment checked, pre-op evaluation, at physician/surgeon's request and post-op pain management  Timeout:       Correct Patient: Yes        Correct Procedure: Yes        Correct Site: Yes        Correct Position: Yes        Correct Laterality: Yes        Site Marked: Yes  Procedure Documentation  Procedure: Femoral       Diagnosis: POST-OP PAIN       Laterality: left       Patient Position: prone       Patient Prep/Sterile Barriers: sterile gloves, mask, patient draped       Skin prep: Chloraprep       Local skin infiltrated with 1 mL of 1% lidocaine.        Needle Type: insulated       Needle Gauge: 21.        Needle Length (Inches): 4        Ultrasound guided       1. Ultrasound was used to identify targeted nerve, plexus, vascular marker, or fascial plane and place a needle adjacent to it in real-time.       2. Ultrasound was used to visualize the spread of anesthetic in close proximity to the above referenced structure.       3. A permanent image is entered into the patient's record.       4. The visualized anatomic structures appeared normal.       5. There were no apparent abnormal pathologic findings.    Assessment/Narrative         The placement was negative for: blood aspirated, painful injection and site bleeding       Paresthesias: No.       Bolus given via needle. no blood aspirated via catheter.        Secured via.        Insertion/Infusion Method: Single Shot       Complications: none       Injection made incrementally with aspirations every 5 mL.    Medication(s) Administered   Ropivacaine 0.5% PF (Infiltration) - Infiltration   10 mL - 4/15/2022 9:40:00 AM

## 2022-04-15 NOTE — ANESTHESIA CARE TRANSFER NOTE
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    Anesthesia Type:   Spinal     Note:    Oropharynx: oropharynx clear of all foreign objects and spontaneously breathing  Level of Consciousness: drowsy  Oxygen Supplementation: face mask  Level of Supplemental Oxygen (L/min / FiO2): 5  Independent Airway: airway patency satisfactory and stable  Dentition: dentition unchanged  Vital Signs Stable: post-procedure vital signs reviewed and stable  Report to RN Given: handoff report given  Patient transferred to: PACU    Handoff Report: Identifed the Patient, Identified the Reponsible Provider, Reviewed the pertinent medical history, Discussed the surgical course, Reviewed Intra-OP anesthesia mangement and issues during anesthesia, Set expectations for post-procedure period and Allowed opportunity for questions and acknowledgement of understanding      Vitals:  Vitals Value Taken Time   /70 04/15/22 1315   Temp 36.8  C (98.2  F) 04/15/22 1155   Pulse 64 04/15/22 1322   Resp 25 04/15/22 1322   SpO2 97 % 04/15/22 1321   Vitals shown include unvalidated device data.    Electronically Signed By: Preston Pappas CRNA, APRN CRNA  April 15, 2022  1:34 PM

## 2022-04-15 NOTE — ANESTHESIA PROCEDURE NOTES
Sciatic Procedure Note    Pre-Procedure   Staff -        CRNA: Preston Pappas APRN CRNA       Other Anesthesia Staff: Chino Perez       Performed By: LUCY       Location: pre-op       Procedure Start/Stop Times: 4/15/2022 9:15 AM and 4/15/2022 9:40 AM       Pre-Anesthestic Checklist: patient identified, IV checked, site marked, risks and benefits discussed, informed consent, monitors and equipment checked, pre-op evaluation, at physician/surgeon's request and post-op pain management  Timeout:       Correct Patient: Yes        Correct Procedure: Yes        Correct Site: Yes        Correct Position: Yes        Correct Laterality: Yes        Site Marked: Yes  Procedure Documentation  Procedure: Sciatic       Diagnosis: POST-OP PAIN       Laterality: left       Patient Position: prone       Patient Prep/Sterile Barriers: sterile gloves, mask, patient draped       Skin prep: Chloraprep       Local skin infiltrated with 1 mL of 1% lidocaine.  (popliteal approach).       Needle Type: insulated       Needle Gauge: 21.        Needle Length (Inches): 4        Ultrasound guided       1. Ultrasound was used to identify targeted nerve, plexus, vascular marker, or fascial plane and place a needle adjacent to it in real-time.       2. Ultrasound was used to visualize the spread of anesthetic in close proximity to the above referenced structure.       3. A permanent image is entered into the patient's record.       4. The visualized anatomic structures appeared normal.       5. There were no apparent abnormal pathologic findings.       Nerve Stim: Initial Level 1 mA.  Lowest motor response 0.4 mA.    Assessment/Narrative         The placement was negative for: blood aspirated, painful injection and site bleeding       Paresthesias: No.       Test dose of mL lidocaine 2% w/ 1:200,000 epinephrine at 09:32 CDT.         Test dose negative, 3 minutes after injection, for signs of intravascular, subdural, or intrathecal  injection.       Bolus given via needle. no blood aspirated via catheter.        Secured via.        Insertion/Infusion Method: Single Shot       Complications: none       Injection made incrementally with aspirations every 5 mL.    Medication(s) Administered   Ropivacaine 0.5% PF (Infiltration) - Infiltration   20 mL - 4/15/2022 9:35:00 AM  Dexamethasone 10 mg/mL PF (Perineural) - Perineural   4 mg - 4/15/2022 9:35:00 AM  Medication Administration Time: 4/15/2022 9:15 AM

## 2022-04-15 NOTE — ANESTHESIA PROCEDURE NOTES
Intrathecal injection Procedure Note    Pre-Procedure   Staff -        CRNA: Preston Pappas APRN CRNA       Performed By: CRNA       Location: OR       Pre-Anesthestic Checklist: patient identified, IV checked, risks and benefits discussed, informed consent, monitors and equipment checked and pre-op evaluation  Timeout:       Correct Patient: Yes        Correct Procedure: Yes        Correct Site: Yes        Correct Position: Yes   Procedure Documentation  Procedure: intrathecal injection       Patient Position: sitting       Patient Prep/Sterile Barriers: sterile gloves, mask, patient draped       Skin prep: Betadine       Insertion Site: L4-5. (midline approach).       Needle Gauge: 22.        Needle Length (Inches): 3.5        Spinal Needle Type: Pencan       Introducer used       # of attempts: 1 and  # of redirects:     Assessment/Narrative         Sensory Level: T7       CSF fluid: clear.     Comments:  Attempted 25g with introducer but unable to reach subarachnoid space due to body habitus.  Success with 22g Quincke, no introducer.

## 2022-04-16 ENCOUNTER — APPOINTMENT (OUTPATIENT)
Dept: PHYSICAL THERAPY | Facility: CLINIC | Age: 77
End: 2022-04-16
Attending: PODIATRIST
Payer: COMMERCIAL

## 2022-04-16 VITALS
WEIGHT: 220 LBS | HEIGHT: 63 IN | BODY MASS INDEX: 38.98 KG/M2 | RESPIRATION RATE: 16 BRPM | SYSTOLIC BLOOD PRESSURE: 126 MMHG | TEMPERATURE: 98.1 F | OXYGEN SATURATION: 92 % | HEART RATE: 65 BPM | DIASTOLIC BLOOD PRESSURE: 60 MMHG

## 2022-04-16 LAB
FASTING STATUS PATIENT QL REPORTED: ABNORMAL
GLUCOSE BLD-MCNC: 106 MG/DL (ref 70–99)
HGB BLD-MCNC: 10.7 G/DL (ref 11.7–15.7)

## 2022-04-16 PROCEDURE — 250N000011 HC RX IP 250 OP 636: Performed by: PHYSICIAN ASSISTANT

## 2022-04-16 PROCEDURE — 97530 THERAPEUTIC ACTIVITIES: CPT | Mod: GP | Performed by: PHYSICAL THERAPIST

## 2022-04-16 PROCEDURE — 36415 COLL VENOUS BLD VENIPUNCTURE: CPT | Performed by: PHYSICIAN ASSISTANT

## 2022-04-16 PROCEDURE — 85018 HEMOGLOBIN: CPT | Performed by: PHYSICIAN ASSISTANT

## 2022-04-16 PROCEDURE — 82947 ASSAY GLUCOSE BLOOD QUANT: CPT | Performed by: PHYSICIAN ASSISTANT

## 2022-04-16 PROCEDURE — 250N000013 HC RX MED GY IP 250 OP 250 PS 637: Performed by: PHYSICIAN ASSISTANT

## 2022-04-16 PROCEDURE — 97110 THERAPEUTIC EXERCISES: CPT | Mod: GP | Performed by: PHYSICAL THERAPIST

## 2022-04-16 PROCEDURE — 97161 PT EVAL LOW COMPLEX 20 MIN: CPT | Mod: GP | Performed by: PHYSICAL THERAPIST

## 2022-04-16 RX ORDER — HYDROCODONE BITARTRATE AND ACETAMINOPHEN 5; 325 MG/1; MG/1
1-2 TABLET ORAL EVERY 6 HOURS PRN
Qty: 12 TABLET | Refills: 0 | Status: SHIPPED | OUTPATIENT
Start: 2022-04-16 | End: 2022-09-19

## 2022-04-16 RX ORDER — AMOXICILLIN 250 MG
1-2 CAPSULE ORAL 2 TIMES DAILY
Qty: 30 TABLET | Refills: 0 | Status: SHIPPED | OUTPATIENT
Start: 2022-04-16 | End: 2023-09-19

## 2022-04-16 RX ORDER — ACETAMINOPHEN 325 MG/1
650 TABLET ORAL EVERY 4 HOURS PRN
Qty: 40 TABLET | Refills: 0 | Status: SHIPPED | OUTPATIENT
Start: 2022-04-16 | End: 2022-09-19

## 2022-04-16 RX ORDER — HYDROXYZINE HYDROCHLORIDE 10 MG/1
10 TABLET, FILM COATED ORAL EVERY 6 HOURS PRN
Qty: 30 TABLET | Refills: 0 | Status: SHIPPED | OUTPATIENT
Start: 2022-04-16 | End: 2022-09-19

## 2022-04-16 RX ORDER — CELECOXIB 100 MG/1
100 CAPSULE ORAL 2 TIMES DAILY
Qty: 30 CAPSULE | Refills: 0 | Status: SHIPPED | OUTPATIENT
Start: 2022-04-16 | End: 2022-09-19

## 2022-04-16 RX ADMIN — SENNOSIDES AND DOCUSATE SODIUM 1 TABLET: 50; 8.6 TABLET ORAL at 09:04

## 2022-04-16 RX ADMIN — HYDROXYZINE HYDROCHLORIDE 10 MG: 10 TABLET ORAL at 12:31

## 2022-04-16 RX ADMIN — CELECOXIB 100 MG: 100 CAPSULE ORAL at 09:04

## 2022-04-16 RX ADMIN — HYDROXYZINE HYDROCHLORIDE 10 MG: 10 TABLET ORAL at 06:49

## 2022-04-16 RX ADMIN — HYDROCODONE BITARTRATE AND ACETAMINOPHEN 2 TABLET: 5; 325 TABLET ORAL at 09:04

## 2022-04-16 RX ADMIN — CEFAZOLIN SODIUM 2 G: 2 INJECTION, SOLUTION INTRAVENOUS at 02:38

## 2022-04-16 RX ADMIN — HYDROCODONE BITARTRATE AND ACETAMINOPHEN 2 TABLET: 5; 325 TABLET ORAL at 03:40

## 2022-04-16 RX ADMIN — ASPIRIN 325 MG: 325 TABLET, COATED ORAL at 09:04

## 2022-04-16 NOTE — PROGRESS NOTES
04/16/22 0755   Quick Adds   Type of Visit Initial PT Evaluation   Living Environment   People in Home spouse   Current Living Arrangements   ([facility])   Home Accessibility wheelchair accessible   Living Environment Comments no steps to enter, has small step in shower, grab bar in shower, has a wc and 4WW, knee scooter, spouse O2 dependent due to COPD so cannot assist much   Self-Care   Activity/Exercise/Self-Care Comment had L TKA in Dec   General Information   Onset of Illness/Injury or Date of Surgery 04/15/22   Referring Physician Estela Hyatt PA-C   Patient/Family Therapy Goals Statement (PT) to return home   Pertinent History of Current Problem (include personal factors and/or comorbidities that impact the POC) s/p L total ankle   Weight-Bearing Status - LLE nonweight-bearing   Cognition   Affect/Mental Status (Cognition) WNL   Orientation Status (Cognition) oriented x 4   Pain Assessment   Patient Currently in Pain Yes, see Vital Sign flowsheet   Range of Motion (ROM)   ROM Comment L ankle NT, otherwise WF B LE   Strength (Manual Muscle Testing)   Strength Comments able to do SLR B and has B good quad set   Bed Mobility   Comment, (Bed Mobility) mod I with rail  supine to sit   Transfers   Comment, (Transfers) SBA with rail  sit to stand, NWB L LE   Gait/Stairs (Locomotion)   Comment, (Gait/Stairs) pivot on R LE to wc CGA/SBA   Clinical Impression   Criteria for Skilled Therapeutic Intervention Yes, treatment indicated   PT Diagnosis (PT) L TAA aftercare   Influenced by the following impairments pain   Functional limitations due to impairments impaired gait/tranfers   Clinical Presentation (PT Evaluation Complexity) Stable/Uncomplicated   Clinical Presentation Rationale clinical judgement   Clinical Decision Making (Complexity) low complexity   Planned Therapy Interventions (PT) gait training;patient/family education;transfer training;wheelchair management/propulsion training;home  program guidelines   Risk & Benefits of therapy have been explained evaluation/treatment results reviewed;care plan/treatment goals reviewed;risks/benefits reviewed;current/potential barriers reviewed;participants voiced agreement with care plan;patient   PT Discharge Planning   PT Discharge Recommendation (DC Rec) home with assist   PT Rationale for DC Rec spouse or son can assist, no steps, has wc can use at home   PT Brief overview of current status SBA with transfers, able tomaintain NWB status   Plan of Care Review   Plan of Care Reviewed With patient   Therapy Certification   Start of care date 04/16/22   Certification date from 04/16/22   Certification date to 05/16/22   Medical Diagnosis L TAA   Total Evaluation Time   Total Evaluation Time (Minutes) 10   Physical Therapy Goals   PT Frequency One time eval and treatment only   PT Predicted Duration/Target Date for Goal Attainment 04/19/22   PT Goals Transfers   PT: Transfers Minimal assist;Sit to/from stand;Bed to/from chair;Within precautions;Goal Met

## 2022-04-16 NOTE — DISCHARGE SUMMARY
DATE OF ADMISSION:  4/15/22    DATE OF DISCHARGE:  4/16/2022    ADMISSION DIAGNOSIS: ankle DJD, hallux valgus, 2nd toe hammertoe, left    PROCEDURE PERFORMED DURING THIS ADMISSION:  Left Total ankle arthroplasty and prophylactic screw of tibia  Akin osteotomy, left foot  Second toe hammertoe correction, left foot  Hardware removal left foot    INDICATION FOR HOSPITAL ADMISSION:  Gail Dewitt presented to the hospital for a total ankle arthroplasty, Akin osteotomy, and 2nd toe hammertoe correction.  She was admitted overnight for pain control and to be evaluated by PT/OT.    HOSPITAL COURSE:  Patient underwent successful total ankle arthroplasty, Akin osteotomy and 2nd toe hammertoe correction.  She reports good relief of pain with her nerve block.  Her pain was controlled overnight with oral medication.  She was seen by PT/OT and was able to maintain nonweightbearing status.  She was allowed to go home on POD #1.  She will follow up with orthopedics as an outpatient.  Please see chart for full details.    Bravo Hyatt PA-C

## 2022-04-16 NOTE — PLAN OF CARE
[facility] DISCHARGE NOTE    Patient discharged to home at 1:21 PM via wheel chair. Accompanied by staff. Discharge instructions reviewed with patient, opportunity offered to ask questions. Prescriptions filled and sent with patient upon discharge. All belongings sent with patient.    Cedric Arreola RN

## 2022-04-16 NOTE — PROGRESS NOTES
"[facility]  Orthopedics Progress Note       S:  POD # 1  s/p Left Total ankle arthroplasty and prophylactic screw of tibia  Akin osteotomy, left foot  Second toe hammertoe correction, left foot  Hardware removal left foot  Patient reports she is doing well.  She feels her pain is being managed with oral medications.  She is working with PT on transfers.  Her only concern is transferring to and from the toilet.  She is interested in going home today.    O:  Blood pressure 126/60, pulse 65, temperature 98.1  F (36.7  C), temperature source Oral, resp. rate 16, height 1.6 m (5' 3\"), weight 99.8 kg (220 lb), SpO2 92 %, not currently breastfeeding.  Lab Results   Component Value Date    HGB 10.7 04/16/2022    HGB 11.6 09/04/2020       Patient is sitting in a wheelchair, because she is working with PT.  She is in no acute distress.  Her splint is clean, dry, and intact.  She is able to lift the leg and bend the knee without difficulty.  Her contralateral leg is soft and nontender to palpation.      A/P:  POD # 1 s/p Left Total ankle arthroplasty and prophylactic screw of tibia  Akin osteotomy, left foot  Second toe hammertoe correction, left foot  Hardware removal left foot  Elevate leg at heart level for edema control  No weightbearing left foot, but may touch foot to ground for balance  Oral medication as needed for pain  PT/OT for gait, transfers and ADL's  Aspirin for DVT prophylaxis  Anticipate patient will be able to discharge home later today, when cleared by PT/OT.      Bravo Hyatt PA-C    "

## 2022-04-16 NOTE — PLAN OF CARE
Patient vital signs are at baseline: Yes  Patient able to ambulate as they were prior to admission or with assist devices provided by therapies during their stay:  No,  Reason:  Is NWB on LLE. Dangled & stood @ bedside this am. Was able to pivot on one foot to move closer to head of bed.  Patient MUST void prior to discharge:  Yes  Patient able to tolerate oral intake:  Yes  Pain has adequate pain control using Oral analgesics:  Yes  Does patient have an identified :  Yes  Has goal D/C date and time been discussed with patient:  Yes

## 2022-04-16 NOTE — PROGRESS NOTES
[facility]      OUTPATIENT PHYSICAL THERAPY EVALUATION  PLAN OF TREATMENT FOR OUTPATIENT REHABILITATION  (COMPLETE FOR INITIAL CLAIMS ONLY)  Patient's Last Name, First Name, M.I.  YOB: 1945  Gail Dewitt                        Provider's Name  [facility] Medical Record No.  [id number]                               Onset Date:  04/15/22   Start of Care Date:  04/16/22      Type:     _X_PT   ___OT   ___SLP Medical Diagnosis:  L TAA                        PT Diagnosis:  L TAA aftercare   Visits from SOC:  1   _________________________________________________________________________________  Plan of Treatment/Functional Goals    Planned Interventions: gait training, patient/family education, transfer training, wheelchair management/propulsion training, home program guidelines     Goals: See Physical Therapy Goals on Care Plan in [facility] electronic health record.    Therapy Frequency: One time eval and treatment only  Predicted Duration of Therapy Intervention: 04/19/22  _________________________________________________________________________________    I CERTIFY THE NEED FOR THESE SERVICES FURNISHED UNDER        THIS PLAN OF TREATMENT AND WHILE UNDER MY CARE     (Physician co-signature of this document indicates review and certification of the therapy plan).                Certification date from: 04/16/22, Certification date to: 05/16/22    Referring Physician: Estela Hyatt PA-C            Initial Assessment        See Physical Therapy evaluation dated 04/16/22 in Epic electronic health record.

## 2022-07-21 NOTE — OP NOTE
Procedure Date: 04/15/2022    PREOPERATIVE DIAGNOSES:    1.  Severe left ankle arthritis with a valgus deformity.  2.  Status post left extensive midfoot and hindfoot joint fusions.  3.  Left hallux valgus.  4.  Left second hammertoe.    POSTOPERATIVE DIAGNOSES:  1.  Severe left ankle arthritis with a valgus deformity.  2.  Status post left extensive midfoot and hindfoot joint fusions.  3.  Left hallux valgus.  4.  Left second hammertoe.  5.  Retained hardware, left posterior heel and ankle.    OPERATION:    1.  Left total ankle arthroplasty with a Urbano Talaris implant size 1, tibia and talus and then size 8 mm polyethylene.  2.  Deep hardware removal, left heel.  3.  Prophylactic fixation of the left medial malleolus of the left tibia.  4.  Akin osteotomy.  5.  Second hammertoe correction.    SURGEON:  Scott José MD    ASSISTANT:  ARIES Blandon.    DESCRIPTION OF PROCEDURE:  The patient was brought to the operating suite and placed in supine position and general anesthetic was given without difficulty.  The left lower extremity was prepped and draped in sterile fashion.  Tourniquet left thigh was inflated to 250 mm of Mercury after exsanguination of the leg with an Esmarch.  The usual anterior incision was made through the interval between the anterior tibial tendon and the EHL and the anterior part of the ankle joint was identified.  There was marked arthritic changes, especially in the lateral side, lateral half of the joint.  There was about a 5-degree valgus deformity with a valgus deformity of the talus and the ankle mortise.  The deltoid ligament seemed to be quite stable and so we straighten out the ankle, took out the spurs anteriorly and then using the external alignment guide for Urbano Talaris set, we made a cut in the distal tibia at about 8 mm.   Bone quality was fairly good at this level and then the flat top cut was made on the talus.  Before we did the talus cut, we had to make a separate  incision on the posterior aspect of the left heel and a large 6.5 cancellous AO Synthes screw was removed without much difficulty.  This wound was irrigated and closed in a layered fashion.    Attention was then turned to the front of the ankle again with the flat top talus cut being done for the screw was removed, the trials were put in place.  A size 1 trial seemed to fit the best on both the talar and the tibial side.  The keel cut on the tibia and then the bell reamers was used on the flat top talus and then the implants were then placed without difficulty.  Again, the bone quality was good.  The position was checked under fluoroscopy and found to be in good position in the AP sagittal and oblique view.  The implant seemed to be stable.  The anterior part of the tibial keel cut was then grafted with cancellous graft and then the usual irrigation was done until and then sucked dry.  A gram of vancomycin powder was applied to reduce the risk of infection.  The usual layered closure was done.  A ZipLine was applied as well.    Attention was then turned to the great toe where Nacho osteotomy was done in the usual fashion at the base of the proximal phalanx of the great toe on the rotational and valgus deformity was corrected through the osteotomy and then held with the small headless compression screws and position again position was checked under fluoroscopy, found to be quite acceptable.  We did have to stretch out the extensor tendon to the great toe a bit to get the toe in a better position and also did a lateral capsulotomy through a separate stab incision.     The mallet toe deformity of the left second toe was then addressed by using a dorsal incision over the DIP joint and a wedge resection DIP joint was done and then small headless compression screw was used to drive across the distal phalanx into the middle phalanx and hold the toe in much improved position.  The wound was irrigated and closed in layered  fashion.  Vallejo dressing was applied.  The tourniquet was released, the cap refill on the toes returned quite nicely.  The patient was brought to recovery room in stable condition.    It should be noted that Bravo Hyatt PA-C was essential in providing intraoperative positioning of the patient, wound retraction, wound irrigation, layered closure and splint application.    Scott José MD        D: 04/15/2022   T: 04/15/2022   MT: sd    Name:     DANIEL VAZQUEZ  MRN:      [id number]        Account:        [id number]   :      1945           Procedure Date: 04/15/2022     Document: [id number]     No

## 2022-09-13 DIAGNOSIS — K74.3 PRIMARY BILIARY CHOLANGITIS (H): Primary | ICD-10-CM

## 2022-09-16 ENCOUNTER — LAB (OUTPATIENT)
Dept: LAB | Facility: CLINIC | Age: 77
End: 2022-09-16
Payer: COMMERCIAL

## 2022-09-16 DIAGNOSIS — K74.3 PRIMARY BILIARY CHOLANGITIS (H): ICD-10-CM

## 2022-09-16 LAB
ALBUMIN SERPL BCG-MCNC: 4.1 G/DL (ref 3.5–5.2)
ALP SERPL-CCNC: 139 U/L (ref 35–104)
ALT SERPL W P-5'-P-CCNC: 14 U/L (ref 10–35)
ANION GAP SERPL CALCULATED.3IONS-SCNC: 9 MMOL/L (ref 7–15)
AST SERPL W P-5'-P-CCNC: 22 U/L (ref 10–35)
BILIRUB DIRECT SERPL-MCNC: <0.2 MG/DL (ref 0–0.3)
BILIRUB SERPL-MCNC: 0.5 MG/DL
BUN SERPL-MCNC: 15.2 MG/DL (ref 8–23)
CALCIUM SERPL-MCNC: 8.9 MG/DL (ref 8.8–10.2)
CHLORIDE SERPL-SCNC: 104 MMOL/L (ref 98–107)
CREAT SERPL-MCNC: 0.72 MG/DL (ref 0.51–0.95)
DEPRECATED HCO3 PLAS-SCNC: 26 MMOL/L (ref 22–29)
ERYTHROCYTE [DISTWIDTH] IN BLOOD BY AUTOMATED COUNT: 14.4 % (ref 10–15)
GFR SERPL CREATININE-BSD FRML MDRD: 85 ML/MIN/1.73M2
GLUCOSE SERPL-MCNC: 106 MG/DL (ref 70–99)
HCT VFR BLD AUTO: 40.6 % (ref 35–47)
HGB BLD-MCNC: 13.3 G/DL (ref 11.7–15.7)
INR PPP: 1.06 (ref 0.85–1.15)
MCH RBC QN AUTO: 27.9 PG (ref 26.5–33)
MCHC RBC AUTO-ENTMCNC: 32.8 G/DL (ref 31.5–36.5)
MCV RBC AUTO: 85 FL (ref 78–100)
PLATELET # BLD AUTO: 251 10E3/UL (ref 150–450)
POTASSIUM SERPL-SCNC: 4.5 MMOL/L (ref 3.4–5.3)
PROT SERPL-MCNC: 7.2 G/DL (ref 6.4–8.3)
RBC # BLD AUTO: 4.76 10E6/UL (ref 3.8–5.2)
SODIUM SERPL-SCNC: 139 MMOL/L (ref 136–145)
WBC # BLD AUTO: 6.7 10E3/UL (ref 4–11)

## 2022-09-16 PROCEDURE — 80053 COMPREHEN METABOLIC PANEL: CPT

## 2022-09-16 PROCEDURE — 36415 COLL VENOUS BLD VENIPUNCTURE: CPT

## 2022-09-16 PROCEDURE — 85027 COMPLETE CBC AUTOMATED: CPT

## 2022-09-16 PROCEDURE — 85610 PROTHROMBIN TIME: CPT

## 2022-09-16 PROCEDURE — 82248 BILIRUBIN DIRECT: CPT

## 2022-09-19 ENCOUNTER — OFFICE VISIT (OUTPATIENT)
Dept: GASTROENTEROLOGY | Facility: CLINIC | Age: 77
End: 2022-09-19
Attending: INTERNAL MEDICINE
Payer: COMMERCIAL

## 2022-09-19 VITALS
DIASTOLIC BLOOD PRESSURE: 74 MMHG | BODY MASS INDEX: 38.14 KG/M2 | SYSTOLIC BLOOD PRESSURE: 131 MMHG | HEART RATE: 63 BPM | OXYGEN SATURATION: 97 % | TEMPERATURE: 98.2 F | WEIGHT: 215.3 LBS

## 2022-09-19 DIAGNOSIS — E66.01 MORBID OBESITY (H): ICD-10-CM

## 2022-09-19 DIAGNOSIS — K74.3 PRIMARY BILIARY CHOLANGITIS (H): Primary | ICD-10-CM

## 2022-09-19 PROCEDURE — 99214 OFFICE O/P EST MOD 30 MIN: CPT | Performed by: INTERNAL MEDICINE

## 2022-09-19 PROCEDURE — G0463 HOSPITAL OUTPT CLINIC VISIT: HCPCS

## 2022-09-19 RX ORDER — URSODIOL 500 MG/1
500 TABLET, FILM COATED ORAL 3 TIMES DAILY
Qty: 270 TABLET | Refills: 3 | Status: SHIPPED | OUTPATIENT
Start: 2022-09-19 | End: 2023-10-26

## 2022-09-19 ASSESSMENT — PAIN SCALES - GENERAL: PAINLEVEL: NO PAIN (0)

## 2022-09-19 NOTE — LETTER
9/19/2022         RE: Gail Dewitt  [address] [place] [id number]        Dear Colleague,    Thank you for referring your patient, Gail Dewitt, to the [facility] [place]. Please see a copy of my visit note below.    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    Last visit 09/2021.  The patient underwent left total knee replacement in 12/2021, [facility].  She underwent left ankle surgery in 04/2022 at [facility].  No new medications, ER visits or unplanned hospital admissions since last visit.    The patient is well today.  She is still recovering from her surgeries.  She has completed a 2-month course of physical therapy and continues to use a cane to ambulate.  She is working on weaning herself off using a cane.  The patient denies any symptoms related to liver disease.    The patient denies jaundice, itch, lower extremity edema, abdominal distention, lethargy or confusion.    The patient denies melena, hematemesis or hematochezia.    The patient denies fever, sweats or chills.  She is vaccinated and boosted against COVID-19.    Weight is stable.  Appetite is normal.    The patient drinks alcohol 2-3 times per year.  She will occasionally have communion wine at [employer].    Medical hx Surgical hx   Past Medical History:   Diagnosis Date     Arthritis      Dyslipidemia      Eczema      Female bladder prolapse      Need for prophylactic hormone replacement therapy (postmenopausal)      Obesity      Unspecified essential hypertension      Unspecified hypothyroidism      Urge incontinence       Past Surgical History:   Procedure Laterality Date     APPENDECTOMY       ARTHROPLASTY ANKLE  Left 4/15/2022    Procedure: Left Total ankle arthroplasty and prophylactic screw of tibia;  Surgeon: Scott Montenegro MD;  Location: [facility] OR     ARTHROPLASTY KNEE Left 12/13/2021    Procedure: LEFT TOTAL KNEE ARTHROPLASTY;  Surgeon: Quinton Cleary MD;  Location: [facility] OR     BREAST BIOPSY, RT/LT Right 1970     BUNIONECTOMY CHEVRON AND ERIBERTO, COMBINED Left 4/15/2022    Procedure: Akin osteotomy, left foot;  Surgeon: Scott Montenegro MD;  Location: [facility] OR     COLPORRHAPHY ANTERIOR  12/6/2010    COLPORRHAPHY ANTERIOR performed by MAY RIVERA at [facility] OR     CYSTOCELE REPAIR  2007     ECTOPIC PREGNANCY SURGERY  1969     EGD  2019     FOOT SURGERY Bilateral     Subtalar foot fusion and bunion surgery     HYSTERECTOMY, JOSEE  1978    Hysterectomy     INJECT EPIDURAL LUMBAR  10/19/2011    Procedure:INJECT EPIDURAL LUMBAR; DESHAWN-; Surgeon:GENERIC ANESTHESIA PROVIDER; Location:[facility] OR     INJECT JOINT SACROILIAC  2/7/2011    INJECT JOINT SACROILIAC performed by GENERIC ANESTHESIA PROVIDER at [facility] OR     INJECT JOINT SACROILIAC  4/25/2012    Procedure:INJECT JOINT SACROILIAC; DESHAWN SI Joint --; Surgeon:GENERIC ANESTHESIA PROVIDER; Location:[facility] OR     OOPHORECTOMY       PHACOEMULSIFICATION WITH STANDARD INTRAOCULAR LENS IMPLANT  5/26/2011    Procedure:PHACOEMULSIFICATION WITH STANDARD INTRAOCULAR LENS IMPLANT; Surgeon:JOSÉ MANUEL DORSEY; Location:[facility] OR     PHACOEMULSIFICATION WITH STANDARD INTRAOCULAR LENS IMPLANT  6/9/2011    Procedure:PHACOEMULSIFICATION WITH STANDARD INTRAOCULAR LENS IMPLANT; Surgeon:JOSÉ MANUEL DORSEY; Location:[facility] OR     RECTOCELE REPAIR  2007     REMOVE HARDWARE FOOT Left 4/15/2022    Procedure: Hardware removal left foot;  Surgeon: Scott Montenegro MD;  Location: [facility] OR     REPAIR HAMMER TOE Left 4/15/2022    Procedure: Second toe hammertoe correction, left foot;  Surgeon: Scott Montenegro MD;  Location: [facility] OR     TONSILLECTOMY             Medications  Prior to Admission medications    Medication Sig Start Date End Date Taking? Authorizing Provider   Calcium Carbonate-Vit D-Min (CALCIUM 1200 PO) Take 1 tablet by mouth daily   Yes Reported, Patient   Cholecalciferol (VITAMIN D) 2000 UNITS CAPS Take 1,000 Units by mouth daily    Yes Reported, Patient   levothyroxine (SYNTHROID/LEVOTHROID) 150 MCG tablet Take 150 mcg by mouth daily   Yes Reported, Patient   losartan (COZAAR) 50 MG tablet Take 1 tablet (50 mg) by mouth daily 10/2/17  Yes Aliyah Aggarwal MD   omeprazole 20 MG tablet Take 20 mg by mouth every evening    Yes Reported, Patient   propylene glycol (SYSTANE BALANCE) 0.6 % SOLN ophthalmic solution Place 1 drop into both eyes 2 times daily    Yes Reported, Patient   senna-docusate (SENOKOT-S/PERICOLACE) 8.6-50 MG tablet Take 1-2 tablets by mouth 2 times daily Take while on oral narcotics to prevent or treat constipation. 4/16/22  Yes Estela Hyatt PA-C   ursodiol (ACTIGALL) 500 MG tablet Take 1 tablet (500 mg) by mouth 3 times daily 9/19/22  Yes Dereck Way MD       Allergies  Allergies   Allergen Reactions     Dilaudid [Hydromorphone Hcl] Hives     Ace Inhibitors Cough     Amlodipine Besylate [Amlodipine] Swelling     Percocet [Oxycodone-Acetaminophen] Other (See Comments)     Severe dizziness         Review of systems  A 10-point review of systems was negative    Examination  /74   Pulse 63   Temp 98.2  F (36.8  C)   Wt 97.7 kg (215 lb 4.8 oz)   SpO2 97%   BMI 38.14 kg/m    Body mass index is 38.14 kg/m .    Gen- well, NAD, A+Ox3, normal color  CVS- RRR  RS- CTA  Abd- obese, SNT, no ascites or organomegaly on palpation or percussion, BS+  Extr- hands normal, mild non-pitting YUMI L ankle  Skin- no rash or jaundice  Neuro- no asterixis  Psych- normal mood    Laboratory  Lab Results   Component Value Date     09/16/2022     09/04/2020    POTASSIUM 4.5 09/16/2022    POTASSIUM 3.9  12/14/2021    POTASSIUM 4.2 09/04/2020    CHLORIDE 104 09/16/2022    CHLORIDE 103 12/14/2021    CHLORIDE 108 09/04/2020    CO2 26 09/16/2022    CO2 25 12/14/2021    CO2 29 09/04/2020    BUN 15.2 09/16/2022    BUN 13 12/14/2021    BUN 19 09/04/2020    CR 0.72 09/16/2022    CR 0.69 09/04/2020       Lab Results   Component Value Date    BILITOTAL 0.5 09/16/2022    BILITOTAL 0.6 09/04/2020    ALT 14 09/16/2022    ALT 18 09/04/2020    AST 22 09/16/2022    AST 17 09/04/2020    ALKPHOS 139 09/16/2022    ALKPHOS 139 09/04/2020       Lab Results   Component Value Date    ALBUMIN 4.1 09/16/2022    ALBUMIN 3.4 08/04/2021    ALBUMIN 3.8 09/04/2020    PROTTOTAL 7.2 09/16/2022    PROTTOTAL 7.4 09/04/2020        Lab Results   Component Value Date    WBC 6.7 09/16/2022    WBC 7.4 09/04/2020    HGB 13.3 09/16/2022    HGB 11.6 09/04/2020    MCV 85 09/16/2022    MCV 84 09/04/2020     09/16/2022     09/04/2020       Lab Results   Component Value Date    INR 1.06 09/16/2022    INR 1.05 09/04/2020       Radiology  Nil recent    Assessment  69-year-old female who presents for follow-up of primary biliary cholangitis.  Alkaline phosphatase mildly elevated compared to last visit.  Remainder of liver function tests are normal.  Ursodiol appropriately dosed based on weight.  Will repeat liver tests in 6 months and consider adding fenofibrate if LFTs remain  abnormal.    Plan  1.  Continue ursodiol 500mg PO TID  2.  Check LFTs in 6 months  3.  Add fenofibrate 160mg PO Q24 if alk phos worse at next check  4.  Follow-up in 12 months    Dereck Aguilar MD  Hepatology  [facility]    I spent 30 minutes on the date of the encounter doing chart review, history and exam, documentation and further activities as noted above.        Again, thank you for allowing me to participate in the care of your patient.        Sincerely,        Dereck Aguilar MD

## 2022-09-19 NOTE — PROGRESS NOTES
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    Last visit 09/2021.  The patient underwent left total knee replacement in 12/2021, [facility].  She underwent left ankle surgery in 04/2022 at [facility].  No new medications, ER visits or unplanned hospital admissions since last visit.    The patient is well today.  She is still recovering from her surgeries.  She has completed a 2-month course of physical therapy and continues to use a cane to ambulate.  She is working on weaning herself off using a cane.  The patient denies any symptoms related to liver disease.    The patient denies jaundice, itch, lower extremity edema, abdominal distention, lethargy or confusion.    The patient denies melena, hematemesis or hematochezia.    The patient denies fever, sweats or chills.  She is vaccinated and boosted against COVID-19.    Weight is stable.  Appetite is normal.    The patient drinks alcohol 2-3 times per year.  She will occasionally have [facility] at [employer].    Medical hx Surgical hx   Past Medical History:   Diagnosis Date     Arthritis      Dyslipidemia      Eczema      Female bladder prolapse      Need for prophylactic hormone replacement therapy (postmenopausal)      Obesity      Unspecified essential hypertension      Unspecified hypothyroidism      Urge incontinence       Past Surgical History:   Procedure Laterality Date     APPENDECTOMY       ARTHROPLASTY ANKLE Left 4/15/2022    Procedure: Left Total ankle arthroplasty and prophylactic screw of tibia;  Surgeon: Scott Montenegro MD;  Location: [facility]     ARTHROPLASTY KNEE Left 12/13/2021    Procedure: LEFT TOTAL KNEE ARTHROPLASTY;  Surgeon: Quinton Cleary MD;   Location: [facility] Main OR     BREAST BIOPSY, RT/LT Right 1970     BUNIONECTOMY CHEVRON AND ERIBERTO, COMBINED Left 4/15/2022    Procedure: Akin osteotomy, left foot;  Surgeon: Scott Montenegro MD;  Location: [facility] OR     COLPORRHAPHY ANTERIOR  12/6/2010    COLPORRHAPHY ANTERIOR performed by MAY RIVERA at [facility] OR     CYSTOCELE REPAIR  2007     ECTOPIC PREGNANCY SURGERY  1969     EGD  2019     FOOT SURGERY Bilateral     Subtalar foot fusion and bunion surgery     HYSTERECTOMY, JOSEE  1978    Hysterectomy     INJECT EPIDURAL LUMBAR  10/19/2011    Procedure:INJECT EPIDURAL LUMBAR; DESHAWN-; Surgeon:GENERIC ANESTHESIA PROVIDER; Location:[facility]     INJECT JOINT SACROILIAC  2/7/2011    INJECT JOINT SACROILIAC performed by GENERIC ANESTHESIA PROVIDER at [facility] OR     INJECT JOINT SACROILIAC  4/25/2012    Procedure:INJECT JOINT SACROILIAC; DESHAWN SI Joint --; Surgeon:GENERIC ANESTHESIA PROVIDER; Location:[facility]     OOPHORECTOMY       PHACOEMULSIFICATION WITH STANDARD INTRAOCULAR LENS IMPLANT  5/26/2011    Procedure:PHACOEMULSIFICATION WITH STANDARD INTRAOCULAR LENS IMPLANT; Surgeon:JOSÉ MANUEL DORSEY; Location:[facility]     PHACOEMULSIFICATION WITH STANDARD INTRAOCULAR LENS IMPLANT  6/9/2011    Procedure:PHACOEMULSIFICATION WITH STANDARD INTRAOCULAR LENS IMPLANT; Surgeon:JOSÉ MANUEL DORSEY; Location:[facility]     RECTOCELE REPAIR  2007     REMOVE HARDWARE FOOT Left 4/15/2022    Procedure: Hardware removal left foot;  Surgeon: Scott Montenegro MD;  Location: [facility]     REPAIR HAMMER TOE Left 4/15/2022    Procedure: Second toe hammertoe correction, left foot;  Surgeon: Scott Montenegro MD;  Location: [facility] OR     TONSILLECTOMY            Medications  Prior to Admission medications    Medication Sig Start Date End Date Taking? Authorizing Provider   Calcium Carbonate-Vit D-Min (CALCIUM 1200 PO) Take 1 tablet by mouth daily   Yes Reported, Patient   Cholecalciferol (VITAMIN D) 2000 UNITS CAPS Take 1,000 Units by  mouth daily    Yes Reported, Patient   levothyroxine (SYNTHROID/LEVOTHROID) 150 MCG tablet Take 150 mcg by mouth daily   Yes Reported, Patient   losartan (COZAAR) 50 MG tablet Take 1 tablet (50 mg) by mouth daily 10/2/17  Yes Aliyah Aggarwal MD   omeprazole 20 MG tablet Take 20 mg by mouth every evening    Yes Reported, Patient   propylene glycol (SYSTANE BALANCE) 0.6 % SOLN ophthalmic solution Place 1 drop into both eyes 2 times daily    Yes Reported, Patient   senna-docusate (SENOKOT-S/PERICOLACE) 8.6-50 MG tablet Take 1-2 tablets by mouth 2 times daily Take while on oral narcotics to prevent or treat constipation. 4/16/22  Yes Estela Hyatt PA-C   ursodiol (ACTIGALL) 500 MG tablet Take 1 tablet (500 mg) by mouth 3 times daily 9/19/22  Yes Dereck Way MD       Allergies  Allergies   Allergen Reactions     Dilaudid [Hydromorphone Hcl] Hives     Ace Inhibitors Cough     Amlodipine Besylate [Amlodipine] Swelling     Percocet [Oxycodone-Acetaminophen] Other (See Comments)     Severe dizziness         Review of systems  A 10-point review of systems was negative    Examination  /74   Pulse 63   Temp 98.2  F (36.8  C)   Wt 97.7 kg (215 lb 4.8 oz)   SpO2 97%   BMI 38.14 kg/m    Body mass index is 38.14 kg/m .    Gen- well, NAD, A+Ox3, normal color  CVS- RRR  RS- CTA  Abd- obese, SNT, no ascites or organomegaly on palpation or percussion, BS+  Extr- hands normal, mild non-pitting YUMI L ankle  Skin- no rash or jaundice  Neuro- no asterixis  Psych- normal mood    Laboratory  Lab Results   Component Value Date     09/16/2022     09/04/2020    POTASSIUM 4.5 09/16/2022    POTASSIUM 3.9 12/14/2021    POTASSIUM 4.2 09/04/2020    CHLORIDE 104 09/16/2022    CHLORIDE 103 12/14/2021    CHLORIDE 108 09/04/2020    CO2 26 09/16/2022    CO2 25 12/14/2021    CO2 29 09/04/2020    BUN 15.2 09/16/2022    BUN 13 12/14/2021    BUN 19 09/04/2020    CR 0.72 09/16/2022    CR 0.69  09/04/2020       Lab Results   Component Value Date    BILITOTAL 0.5 09/16/2022    BILITOTAL 0.6 09/04/2020    ALT 14 09/16/2022    ALT 18 09/04/2020    AST 22 09/16/2022    AST 17 09/04/2020    ALKPHOS 139 09/16/2022    ALKPHOS 139 09/04/2020       Lab Results   Component Value Date    ALBUMIN 4.1 09/16/2022    ALBUMIN 3.4 08/04/2021    ALBUMIN 3.8 09/04/2020    PROTTOTAL 7.2 09/16/2022    PROTTOTAL 7.4 09/04/2020        Lab Results   Component Value Date    WBC 6.7 09/16/2022    WBC 7.4 09/04/2020    HGB 13.3 09/16/2022    HGB 11.6 09/04/2020    MCV 85 09/16/2022    MCV 84 09/04/2020     09/16/2022     09/04/2020       Lab Results   Component Value Date    INR 1.06 09/16/2022    INR 1.05 09/04/2020       Radiology  Nil recent    Assessment  69-year-old female who presents for follow-up of primary biliary cholangitis.  Alkaline phosphatase mildly elevated compared to last visit.  Remainder of liver function tests are normal.  Ursodiol appropriately dosed based on weight.  Will repeat liver tests in 6 months and consider adding fenofibrate if LFTs remain  abnormal.    Plan  1.  Continue ursodiol 500mg PO TID  2.  Check LFTs in 6 months  3.  Add fenofibrate 160mg PO Q24 if alk phos worse at next check  4.  Follow-up in 12 months    Dereck Aguilar MD  Hepatology  [facility] spent 30 minutes on the date of the encounter doing chart review, history and exam, documentation and further activities as noted above.

## 2022-09-19 NOTE — NURSING NOTE
Chief Complaint   Patient presents with     RECHECK     Return visit, no new concerns.     Blood pressure 131/74, pulse 63, temperature 98.2  F (36.8  C), weight 97.7 kg (215 lb 4.8 oz), SpO2 97 %, not currently breastfeeding.    LISA DORSEY

## 2022-09-19 NOTE — PATIENT INSTRUCTIONS
Plan   Check blood work in 6 months  No change to medications  See me in 12 months    Dereck Aguilar MD  Hepatology

## 2022-11-14 ENCOUNTER — VIRTUAL VISIT (OUTPATIENT)
Dept: FAMILY MEDICINE | Facility: CLINIC | Age: 77
End: 2022-11-14
Payer: COMMERCIAL

## 2022-11-14 DIAGNOSIS — U07.1 COVID-19 VIRUS INFECTION: Primary | ICD-10-CM

## 2022-11-14 PROCEDURE — 99442 PR PHYSICIAN TELEPHONE EVALUATION 11-20 MIN: CPT | Mod: CS | Performed by: PHYSICIAN ASSISTANT

## 2022-11-14 ASSESSMENT — PAIN SCALES - GENERAL: PAINLEVEL: MILD PAIN (3)

## 2022-11-14 NOTE — PATIENT INSTRUCTIONS
Mariia Reynolds,    Thank you for allowing [facility] to manage your care.    If you develop worsening/changing symptoms at any time, please call 911 or go to the emergency department for evaluation.    I sent your prescriptions to your pharmacy.    Drink 8-10 glasses of fluid daily to stay well-hydrated.    If you have any questions or concerns, please feel free to call us at [phone]    Sincerely,    Mook Gonzales PA-C    Did you know?      You can schedule a video visit for follow-up appointments as well as future appointments for certain conditions.  Please see the below link.     [email or web address]    If you have not already done so,  I encourage you to sign up for [email or web address] ([email or web address]).  This will allow you to review your results, securely communicate with a provider, and schedule virtual visits as well.

## 2022-11-14 NOTE — PROGRESS NOTES
"Gail is a 77 year old who is being evaluated via a billable telephone visit.      What phone number would you like to be contacted at?  [phone]  How would you like to obtain your AVS? [facility]    Assessment & Plan   Problem List Items Addressed This Visit    None  Visit Diagnoses     COVID-19 virus infection    -  Primary    Relevant Medications    nirmatrelvir and ritonavir (PAXLOVID) therapy pack         Impression is COVID-19. Positive home test. Sounds well and non-toxic and I have low suspicion for impending airway obstruction or respiratory distress at this point.  She will push p.o. fluids, use over-the-counter meds for symptoms, complete a course of Paxlovid after discussing risks/benefits, and follow-up with us in 2 weeks if not improving or urgent care/the ER if symptoms worsen/change at any time.    DDx and Dx discussed with and explained to the pt to their satisfaction.  All questions were answered at this time. Pt expressed understanding of and agreement with this dx, tx, and plan. No further workup warranted and standard medication warnings given. I have given the patient a list of pertinent indications for re-evaluation. Will go to the Emergency Department if symptoms worsen or new concerning symptoms arise. Patient left the call in no apparent distress.     Prescription drug management  17 minutes spent on the date of the encounter doing chart review, history and exam, documentation and further activities per the note     BMI:   Estimated body mass index is 38.14 kg/m  as calculated from the following:    Height as of 4/15/22: 1.6 m (5' 3\").    Weight as of 9/19/22: 97.7 kg (215 lb 4.8 oz).     See Patient Instructions  COVID-19 positive patient.  Encounter for consideration of medication intervention. Patient does qualify for a prescription. Full discussion with patient including medication options, risks and benefits. Potential drug interactions reviewed with patient.     Treatment Planned: " "Paxlovid  Temporary change to home medications:  None     Estimated body mass index is 38.14 kg/m  as calculated from the following:    Height as of 4/15/22: 1.6 m (5' 3\").    Weight as of 9/19/22: 97.7 kg (215 lb 4.8 oz).  GFR Estimate   Date Value Ref Range Status   09/16/2022 85 >60 mL/min/1.73m2 Final     Comment:     Effective December 21, 2021 eGFRcr in adults is calculated using the 2021 CKD-EPI creatinine equation which includes age and gender (Jacky et al., NEJ, DOI: 10.1056/CIKLwc0714560)   09/04/2020 84 >60 mL/min/[1.73_m2] Final     Comment:     Non  GFR Calc  Starting 12/18/2018, serum creatinine based estimated GFR (eGFR) will be   calculated using the Chronic Kidney Disease Epidemiology Collaboration   (CKD-EPI) equation.       Lab Results   Component Value Date    [id number] Negative 04/13/2022       Return in about 1 week (around 11/21/2022) for a recheck of your symptoms if not improving, or call 911/go to an ER anytime if worsening.    ARIES Baker  [facility] BRENNAN Reynolds is a 77 year old, presenting for the following health issues:  Covid Concern (/)      HPI       COVID-19 Symptom Review  How many days ago did these symptoms start? Symptoms started last night around 8PM, tested positive 11/14/2022.    Are any of the following symptoms significant for you?    New or worsening difficulty breathing? No    Worsening cough? No    Fever or chills? Yes, I felt feverish or had chills.    Headache: No    Sore throat: YES    Chest pain: No    Diarrhea: No    Body aches? YES    What treatments has patient tried? None    Does patient live in a nursing home, group home, or shelter? No  Does patient have a way to get food/medications during quarantined? Yes, I have a friend or family member who can help me.    Review of Systems   Constitutional, HEENT, cardiovascular, pulmonary, gi and gu systems are negative, except as otherwise noted.      Objective  "            Physical Exam   healthy, alert and no distress  PSYCH: Alert and oriented times 3; coherent speech, normal   rate and volume, able to articulate logical thoughts, able   to abstract reason, no tangential thoughts, no hallucinations   or delusions  Her affect is normal and pleasant  RESP: No cough, no audible wheezing, able to talk in full sentences  Remainder of exam unable to be completed due to telephone visits        Phone call duration: 15 minutes

## 2022-11-19 ENCOUNTER — HEALTH MAINTENANCE LETTER (OUTPATIENT)
Age: 77
End: 2022-11-19

## 2023-03-20 ENCOUNTER — LAB (OUTPATIENT)
Dept: LAB | Facility: CLINIC | Age: 78
End: 2023-03-20
Payer: COMMERCIAL

## 2023-03-20 DIAGNOSIS — K74.3 PRIMARY BILIARY CHOLANGITIS (H): ICD-10-CM

## 2023-03-20 LAB
ALBUMIN SERPL BCG-MCNC: 4.3 G/DL (ref 3.5–5.2)
ALP SERPL-CCNC: 135 U/L (ref 35–104)
ALT SERPL W P-5'-P-CCNC: 14 U/L (ref 10–35)
AST SERPL W P-5'-P-CCNC: 22 U/L (ref 10–35)
BILIRUB DIRECT SERPL-MCNC: <0.2 MG/DL (ref 0–0.3)
BILIRUB SERPL-MCNC: 0.4 MG/DL
PROT SERPL-MCNC: 7.9 G/DL (ref 6.4–8.3)

## 2023-03-20 PROCEDURE — 80076 HEPATIC FUNCTION PANEL: CPT

## 2023-03-20 PROCEDURE — 36415 COLL VENOUS BLD VENIPUNCTURE: CPT

## 2023-04-09 ENCOUNTER — HEALTH MAINTENANCE LETTER (OUTPATIENT)
Age: 78
End: 2023-04-09

## 2023-09-12 ENCOUNTER — TELEPHONE (OUTPATIENT)
Dept: GASTROENTEROLOGY | Facility: CLINIC | Age: 78
End: 2023-09-12

## 2023-09-12 DIAGNOSIS — K74.3 PRIMARY BILIARY CHOLANGITIS (H): Primary | ICD-10-CM

## 2023-09-12 NOTE — PROGRESS NOTES
Your patient has a lab appointment on 9.14.23 for  labs per Dr Aguilar. At this time there are no orders placed for there lab appointment. Please review and sign orders prior to there appointment time. Thank you.        Farheen Zepeda CMA (Lab)

## 2023-09-15 ENCOUNTER — LAB (OUTPATIENT)
Dept: LAB | Facility: CLINIC | Age: 78
End: 2023-09-15
Payer: COMMERCIAL

## 2023-09-15 DIAGNOSIS — K74.3 PRIMARY BILIARY CHOLANGITIS (H): ICD-10-CM

## 2023-09-15 LAB
ALBUMIN SERPL BCG-MCNC: 4.1 G/DL (ref 3.5–5.2)
ALP SERPL-CCNC: 123 U/L (ref 35–104)
ALT SERPL W P-5'-P-CCNC: 12 U/L (ref 0–50)
ANION GAP SERPL CALCULATED.3IONS-SCNC: 10 MMOL/L (ref 7–15)
AST SERPL W P-5'-P-CCNC: 24 U/L (ref 0–45)
BILIRUB DIRECT SERPL-MCNC: <0.2 MG/DL (ref 0–0.3)
BILIRUB SERPL-MCNC: 0.5 MG/DL
BUN SERPL-MCNC: 17.8 MG/DL (ref 8–23)
CALCIUM SERPL-MCNC: 8.9 MG/DL (ref 8.8–10.2)
CHLORIDE SERPL-SCNC: 104 MMOL/L (ref 98–107)
CREAT SERPL-MCNC: 0.65 MG/DL (ref 0.51–0.95)
DEPRECATED HCO3 PLAS-SCNC: 26 MMOL/L (ref 22–29)
EGFRCR SERPLBLD CKD-EPI 2021: 89 ML/MIN/1.73M2
ERYTHROCYTE [DISTWIDTH] IN BLOOD BY AUTOMATED COUNT: 13.2 % (ref 10–15)
GLUCOSE SERPL-MCNC: 132 MG/DL (ref 70–99)
HCT VFR BLD AUTO: 41 % (ref 35–47)
HGB BLD-MCNC: 13.7 G/DL (ref 11.7–15.7)
INR PPP: 1.01 (ref 0.85–1.15)
MCH RBC QN AUTO: 28.8 PG (ref 26.5–33)
MCHC RBC AUTO-ENTMCNC: 33.4 G/DL (ref 31.5–36.5)
MCV RBC AUTO: 86 FL (ref 78–100)
PLATELET # BLD AUTO: 251 10E3/UL (ref 150–450)
POTASSIUM SERPL-SCNC: 4.3 MMOL/L (ref 3.4–5.3)
PROT SERPL-MCNC: 6.8 G/DL (ref 6.4–8.3)
RBC # BLD AUTO: 4.76 10E6/UL (ref 3.8–5.2)
SODIUM SERPL-SCNC: 140 MMOL/L (ref 136–145)
WBC # BLD AUTO: 5.8 10E3/UL (ref 4–11)

## 2023-09-15 PROCEDURE — 36415 COLL VENOUS BLD VENIPUNCTURE: CPT

## 2023-09-15 PROCEDURE — 85610 PROTHROMBIN TIME: CPT

## 2023-09-15 PROCEDURE — 82248 BILIRUBIN DIRECT: CPT

## 2023-09-15 PROCEDURE — 80053 COMPREHEN METABOLIC PANEL: CPT

## 2023-09-15 PROCEDURE — 85027 COMPLETE CBC AUTOMATED: CPT

## 2023-09-19 ENCOUNTER — VIRTUAL VISIT (OUTPATIENT)
Dept: GASTROENTEROLOGY | Facility: CLINIC | Age: 78
End: 2023-09-19
Attending: INTERNAL MEDICINE
Payer: COMMERCIAL

## 2023-09-19 DIAGNOSIS — K74.3 PRIMARY BILIARY CHOLANGITIS (H): Primary | ICD-10-CM

## 2023-09-19 PROCEDURE — 99214 OFFICE O/P EST MOD 30 MIN: CPT | Mod: VID | Performed by: INTERNAL MEDICINE

## 2023-09-19 ASSESSMENT — PAIN SCALES - GENERAL: PAINLEVEL: NO PAIN (0)

## 2023-09-19 NOTE — LETTER
9/19/2023         RE: Gail Dewitt  [address] [place] [address]        Dear Colleague,    Thank you for referring your patient, Gail Dewitt, to the [facility] [place]. Please see a copy of my visit note below.    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  Patient sustained a fracture to her right foot earlier this month.  She did not require surgery.  No new medications or hospitalizations since last visit.    Patient is well today.  She denies any symptoms related to liver disease.    Patient denies itch, jaundice, lower extremity edema, abdominal distension, lethargy or confusion.    Patient denies melena, hematemesis or hematochezia.    Patient denies fevers, sweats or chills.      Weight stable.  Appetite is normal.    Patient does not drink alcohol regularly.      Medical hx Surgical hx   Past Medical History:   Diagnosis Date     Arthritis      Dyslipidemia      Eczema      Female bladder prolapse      Need for prophylactic hormone replacement therapy (postmenopausal)      Obesity      Unspecified essential hypertension      Unspecified hypothyroidism      Urge incontinence       Past Surgical History:   Procedure Laterality Date     APPENDECTOMY       ARTHROPLASTY ANKLE Left 4/15/2022    Procedure: Left Total ankle arthroplasty and prophylactic screw of tibia;  Surgeon: Scott Montenegro MD;  Location: [facility] OR     ARTHROPLASTY KNEE Left 12/13/2021    Procedure: LEFT TOTAL KNEE ARTHROPLASTY;  Surgeon: Quinton Cleary MD;  Location: [facility] OR     BREAST BIOPSY, RT/LT Right 1970     BUNIONECTOMY ELIANE AND ERIBERTO, COMBINED Left 4/15/2022    Procedure: Akin osteotomy, left foot;  Surgeon: Thony José  Scott Echavarria MD;  Location: [facility]     COLPORRHAPHY ANTERIOR  12/6/2010    COLPORRHAPHY ANTERIOR performed by MAY RIVERA at [facility] OR     CYSTOCELE REPAIR  2007     ECTOPIC PREGNANCY SURGERY  1969     EGD  2019     FOOT SURGERY Bilateral     Subtalar foot fusion and bunion surgery     HYSTERECTOMY, JOSEE  1978    Hysterectomy     INJECT EPIDURAL LUMBAR  10/19/2011    Procedure:INJECT EPIDURAL LUMBAR; DESHAWN-; Surgeon:GENERIC ANESTHESIA PROVIDER; Location:[facility]     INJECT JOINT SACROILIAC  2/7/2011    INJECT JOINT SACROILIAC performed by GENERIC ANESTHESIA PROVIDER at [facility] OR     INJECT JOINT SACROILIAC  4/25/2012    Procedure:INJECT JOINT SACROILIAC; DESHAWN SI Joint --; Surgeon:GENERIC ANESTHESIA PROVIDER; Location:[facility]     OOPHORECTOMY       PHACOEMULSIFICATION WITH STANDARD INTRAOCULAR LENS IMPLANT  5/26/2011    Procedure:PHACOEMULSIFICATION WITH STANDARD INTRAOCULAR LENS IMPLANT; Surgeon:JOSÉ MANUEL DORSEY; Location:[facility]     PHACOEMULSIFICATION WITH STANDARD INTRAOCULAR LENS IMPLANT  6/9/2011    Procedure:PHACOEMULSIFICATION WITH STANDARD INTRAOCULAR LENS IMPLANT; Surgeon:JOSÉ MANUEL DORSEY; Location:[facility]     RECTOCELE REPAIR  2007     REMOVE HARDWARE FOOT Left 4/15/2022    Procedure: Hardware removal left foot;  Surgeon: Scott Montenegro MD;  Location: [facility]     REPAIR HAMMER TOE Left 4/15/2022    Procedure: Second toe hammertoe correction, left foot;  Surgeon: Scott Montenegro MD;  Location: [facility]     TONSILLECTOMY            Medications  Prior to Admission medications    Medication Sig Start Date End Date Taking? Authorizing Provider   Calcium Carbonate-Vit D-Min (CALCIUM 1200 PO) Take 1 tablet by mouth daily   Yes Reported, Patient   levothyroxine (SYNTHROID/LEVOTHROID) 150 MCG tablet Take 150 mcg by mouth daily   Yes Reported, Patient   losartan (COZAAR) 50 MG tablet Take 1 tablet (50 mg) by mouth daily 10/2/17  Yes Aliyah Aggarwal MD   omeprazole 20 MG tablet Take 20 mg by  mouth every evening    Yes Reported, Patient   propylene glycol (SYSTANE BALANCE) 0.6 % SOLN ophthalmic solution Place 1 drop into both eyes 2 times daily    Yes Reported, Patient   ursodiol (ACTIGALL) 500 MG tablet Take 1 tablet (500 mg) by mouth 3 times daily 9/19/22  Yes Dereck Way MD       Allergies  Allergies   Allergen Reactions     Dilaudid [Hydromorphone Hcl] Hives     Ace Inhibitors Cough     Amlodipine Besylate [Amlodipine] Swelling     Percocet [Oxycodone-Acetaminophen] Other (See Comments)     Severe dizziness         Review of systems  A 10-point review of systems was negative    Examination  Gen- well, NAD, A+Ox3, normal color  Eye- EOMI, no scleral icterus  Skin- no rash or jaundice  Psych- normal mood    Laboratory  Lab Results   Component Value Date     09/15/2023     09/04/2020    POTASSIUM 4.3 09/15/2023    POTASSIUM 3.9 12/14/2021    POTASSIUM 4.2 09/04/2020    CHLORIDE 104 09/15/2023    CHLORIDE 103 12/14/2021    CHLORIDE 108 09/04/2020    CO2 26 09/15/2023    CO2 25 12/14/2021    CO2 29 09/04/2020    BUN 17.8 09/15/2023    BUN 13 12/14/2021    BUN 19 09/04/2020    CR 0.65 09/15/2023    CR 0.69 09/04/2020       Lab Results   Component Value Date    BILITOTAL 0.5 09/15/2023    BILITOTAL 0.6 09/04/2020    ALT 12 09/15/2023    ALT 18 09/04/2020    AST 24 09/15/2023    AST 17 09/04/2020    ALKPHOS 123 09/15/2023    ALKPHOS 139 09/04/2020       Lab Results   Component Value Date    ALBUMIN 4.1 09/15/2023    ALBUMIN 3.4 08/04/2021    ALBUMIN 3.8 09/04/2020    PROTTOTAL 6.8 09/15/2023    PROTTOTAL 7.4 09/04/2020        Lab Results   Component Value Date    WBC 5.8 09/15/2023    WBC 7.4 09/04/2020    HGB 13.7 09/15/2023    HGB 11.6 09/04/2020    MCV 86 09/15/2023    MCV 84 09/04/2020     09/15/2023     09/04/2020       Lab Results   Component Value Date    INR 1.01 09/15/2023    INR 1.05 09/04/2020       Radiology  Nil recent    Assessment  78 year old female who  presents for follow-up of primary biliary cholangitis.  Liver function tests mildly elevated on ursodiol.  Ursodiol is appropriately dosed based on patient's weight.  Patient is asymptomatic from PBC.  Given the patient's advanced age and lack of symptoms, will not add fenofibrate and monitor clinically for now.    Plan  1.  Continue ursodiol at current dose  2.  Check LFTs in 6 months  3.  Follow-up in 12 months    Dereck Aguilar MD  Hepatology  [facility]    I spent 30 minutes on the date of the encounter doing chart review, history and exam, documentation and further activities as noted above.    Virtual Visit Details    Type of service:  Video Visit   Video Start Time:  1036  Video End Time: 1045    Originating Location (pt. Location): Home    Distant Location (provider location):  On-site  Platform used for Video Visit: [facility]      Again, thank you for allowing me to participate in the care of your patient.        Sincerely,        Dereck Aguilar MD

## 2023-09-19 NOTE — NURSING NOTE
Is the patient currently in the state of [place]? YES    Visit mode:VIDEO    If the visit is dropped, the patient can be reconnected by: VIDEO VISIT: Text to cell phone:   Telephone Information:   Mobile [phone]       Will anyone else be joining the visit? NO  (If patient encounters technical issues they should call [phone]    How would you like to obtain your AVS? MyChart    Are changes needed to the allergy or medication list? No    Reason for visit: No chief complaint on file.    Salina GARDUNO

## 2023-09-19 NOTE — PROGRESS NOTES
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  Patient sustained a fracture to her right foot earlier this month.  She did not require surgery.  No new medications or hospitalizations since last visit.    Patient is well today.  She denies any symptoms related to liver disease.    Patient denies itch, jaundice, lower extremity edema, abdominal distension, lethargy or confusion.    Patient denies melena, hematemesis or hematochezia.    Patient denies fevers, sweats or chills.      Weight stable.  Appetite is normal.    Patient does not drink alcohol regularly.      Medical hx Surgical hx   Past Medical History:   Diagnosis Date     Arthritis      Dyslipidemia      Eczema      Female bladder prolapse      Need for prophylactic hormone replacement therapy (postmenopausal)      Obesity      Unspecified essential hypertension      Unspecified hypothyroidism      Urge incontinence       Past Surgical History:   Procedure Laterality Date     APPENDECTOMY       ARTHROPLASTY ANKLE Left 4/15/2022    Procedure: Left Total ankle arthroplasty and prophylactic screw of tibia;  Surgeon: Scott Montenegro MD;  Location: [facility] OR     ARTHROPLASTY KNEE Left 12/13/2021    Procedure: LEFT TOTAL KNEE ARTHROPLASTY;  Surgeon: Quinton Cleary MD;  Location: [facility] OR     BREAST BIOPSY, RT/LT Right 1970     BUNIONECTOMY ELIANE AND ERIBERTO, COMBINED Left 4/15/2022    Procedure: Akin osteotomy, left foot;  Surgeon: Scott Montenegro MD;  Location: [facility] OR     COLPORRHAPHY ANTERIOR  12/6/2010    COLPORRHAPHY ANTERIOR performed by MAY RIVERA at [facility] OR     CYSTOCELE REPAIR  2007     ECTOPIC PREGNANCY SURGERY  1969     EGD  2019     FOOT SURGERY Bilateral     Subtalar foot fusion  and bunion surgery     HYSTERECTOMY, JOSEE  1978    Hysterectomy     INJECT EPIDURAL LUMBAR  10/19/2011    Procedure:INJECT EPIDURAL LUMBAR; DESHAWN-; Surgeon:GENERIC ANESTHESIA PROVIDER; Location:[facility] OR     INJECT JOINT SACROILIAC  2/7/2011    INJECT JOINT SACROILIAC performed by GENERIC ANESTHESIA PROVIDER at [facility] OR     INJECT JOINT SACROILIAC  4/25/2012    Procedure:INJECT JOINT SACROILIAC; DESHAWN SI Joint --; Surgeon:GENERIC ANESTHESIA PROVIDER; Location:[facility] OR     OOPHORECTOMY       PHACOEMULSIFICATION WITH STANDARD INTRAOCULAR LENS IMPLANT  5/26/2011    Procedure:PHACOEMULSIFICATION WITH STANDARD INTRAOCULAR LENS IMPLANT; Surgeon:JOSÉ MANUEL DORSEY; Location:[facility] OR     PHACOEMULSIFICATION WITH STANDARD INTRAOCULAR LENS IMPLANT  6/9/2011    Procedure:PHACOEMULSIFICATION WITH STANDARD INTRAOCULAR LENS IMPLANT; Surgeon:JOSÉ MANUEL DORSEY; Location:[facility]     RECTOCELE REPAIR  2007     REMOVE HARDWARE FOOT Left 4/15/2022    Procedure: Hardware removal left foot;  Surgeon: Scott Montenegro MD;  Location: [facility] OR     REPAIR HAMMER TOE Left 4/15/2022    Procedure: Second toe hammertoe correction, left foot;  Surgeon: Scott Montenegro MD;  Location: [facility] OR     TONSILLECTOMY            Medications  Prior to Admission medications    Medication Sig Start Date End Date Taking? Authorizing Provider   Calcium Carbonate-Vit D-Min (CALCIUM 1200 PO) Take 1 tablet by mouth daily   Yes Reported, Patient   levothyroxine (SYNTHROID/LEVOTHROID) 150 MCG tablet Take 150 mcg by mouth daily   Yes Reported, Patient   losartan (COZAAR) 50 MG tablet Take 1 tablet (50 mg) by mouth daily 10/2/17  Yes Aliyah Aggarwal MD   omeprazole 20 MG tablet Take 20 mg by mouth every evening    Yes Reported, Patient   propylene glycol (SYSTANE BALANCE) 0.6 % SOLN ophthalmic solution Place 1 drop into both eyes 2 times daily    Yes Reported, Patient   ursodiol (ACTIGALL) 500 MG tablet Take 1 tablet (500 mg) by mouth 3 times daily  9/19/22  Yes Dereck Way MD       Allergies  Allergies   Allergen Reactions     Dilaudid [Hydromorphone Hcl] Hives     Ace Inhibitors Cough     Amlodipine Besylate [Amlodipine] Swelling     Percocet [Oxycodone-Acetaminophen] Other (See Comments)     Severe dizziness         Review of systems  A 10-point review of systems was negative    Examination  Gen- well, NAD, A+Ox3, normal color  Eye- EOMI, no scleral icterus  Skin- no rash or jaundice  Psych- normal mood    Laboratory  Lab Results   Component Value Date     09/15/2023     09/04/2020    POTASSIUM 4.3 09/15/2023    POTASSIUM 3.9 12/14/2021    POTASSIUM 4.2 09/04/2020    CHLORIDE 104 09/15/2023    CHLORIDE 103 12/14/2021    CHLORIDE 108 09/04/2020    CO2 26 09/15/2023    CO2 25 12/14/2021    CO2 29 09/04/2020    BUN 17.8 09/15/2023    BUN 13 12/14/2021    BUN 19 09/04/2020    CR 0.65 09/15/2023    CR 0.69 09/04/2020       Lab Results   Component Value Date    BILITOTAL 0.5 09/15/2023    BILITOTAL 0.6 09/04/2020    ALT 12 09/15/2023    ALT 18 09/04/2020    AST 24 09/15/2023    AST 17 09/04/2020    ALKPHOS 123 09/15/2023    ALKPHOS 139 09/04/2020       Lab Results   Component Value Date    ALBUMIN 4.1 09/15/2023    ALBUMIN 3.4 08/04/2021    ALBUMIN 3.8 09/04/2020    PROTTOTAL 6.8 09/15/2023    PROTTOTAL 7.4 09/04/2020        Lab Results   Component Value Date    WBC 5.8 09/15/2023    WBC 7.4 09/04/2020    HGB 13.7 09/15/2023    HGB 11.6 09/04/2020    MCV 86 09/15/2023    MCV 84 09/04/2020     09/15/2023     09/04/2020       Lab Results   Component Value Date    INR 1.01 09/15/2023    INR 1.05 09/04/2020       Radiology  Nil recent    Assessment  78 year old female who presents for follow-up of primary biliary cholangitis.  Liver function tests mildly elevated on ursodiol.  Ursodiol is appropriately dosed based on patient's weight.  Patient is asymptomatic from PBC.  Given the patient's advanced age and lack of symptoms, will  not add fenofibrate and monitor clinically for now.    Plan  1.  Continue ursodiol at current dose  2.  Check LFTs in 6 months  3.  Follow-up in 12 months    Dereck Aguilar MD  Hepatology  [facility]    I spent 30 minutes on the date of the encounter doing chart review, history and exam, documentation and further activities as noted above.    Virtual Visit Details    Type of service:  Video Visit   Video Start Time:  1036  Video End Time: 1045    Originating Location (pt. Location): Home    Distant Location (provider location):  On-site  Platform used for Video Visit: [employer]

## 2023-10-26 DIAGNOSIS — K74.3 PRIMARY BILIARY CHOLANGITIS (H): ICD-10-CM

## 2023-10-26 RX ORDER — URSODIOL 500 MG/1
500 TABLET, FILM COATED ORAL 3 TIMES DAILY
Qty: 270 TABLET | Refills: 3 | Status: SHIPPED | OUTPATIENT
Start: 2023-10-26 | End: 2024-09-30

## 2023-12-23 ENCOUNTER — OFFICE VISIT (OUTPATIENT)
Dept: URGENT CARE | Facility: URGENT CARE | Age: 78
End: 2023-12-23
Payer: COMMERCIAL

## 2023-12-23 VITALS
WEIGHT: 200 LBS | RESPIRATION RATE: 16 BRPM | BODY MASS INDEX: 35.43 KG/M2 | DIASTOLIC BLOOD PRESSURE: 74 MMHG | OXYGEN SATURATION: 94 % | TEMPERATURE: 99.1 F | HEART RATE: 85 BPM | SYSTOLIC BLOOD PRESSURE: 130 MMHG

## 2023-12-23 DIAGNOSIS — R05.1 ACUTE COUGH: Primary | ICD-10-CM

## 2023-12-23 DIAGNOSIS — U07.1 INFECTION DUE TO 2019 NOVEL CORONAVIRUS: ICD-10-CM

## 2023-12-23 DIAGNOSIS — R50.9 FEVER, UNSPECIFIED: ICD-10-CM

## 2023-12-23 LAB
FLUAV AG SPEC QL IA: NEGATIVE
FLUBV AG SPEC QL IA: NEGATIVE

## 2023-12-23 PROCEDURE — 99203 OFFICE O/P NEW LOW 30 MIN: CPT | Performed by: NURSE PRACTITIONER

## 2023-12-23 PROCEDURE — 87804 INFLUENZA ASSAY W/OPTIC: CPT | Performed by: NURSE PRACTITIONER

## 2023-12-23 RX ORDER — BENZONATATE 100 MG/1
100 CAPSULE ORAL 3 TIMES DAILY PRN
Qty: 30 CAPSULE | Refills: 0 | Status: SHIPPED | OUTPATIENT
Start: 2023-12-23 | End: 2024-01-02

## 2023-12-23 RX ORDER — PREDNISONE 20 MG/1
20 TABLET ORAL DAILY
Qty: 5 TABLET | Refills: 0 | Status: SHIPPED | OUTPATIENT
Start: 2023-12-23 | End: 2023-12-28

## 2023-12-23 NOTE — PATIENT INSTRUCTIONS
-See handout on bronchitis.  Take steroid for 5 days.  Follow up with your primary provider in the next 7-10 days if not improving as expected. Sooner if worse, ER with trouble breathing.

## 2023-12-23 NOTE — PROGRESS NOTES
SUBJECTIVE:  Gail Dewitt is a 78 year old female who presents to the clinic today with a chief complaint of cough  for 1 day(s), and feeling exhausted.  Her cough is described as productive of yellow sputum, started as dry cough yesterday, today is productive. The patient's symptoms are moderate and worsening.  Associated symptoms include rhinorrhea and sneezing. The patient's symptoms are exacerbated by no particular triggers.    Patient has been using nothing to improve symptoms.    Past Medical History:   Diagnosis Date    Arthritis     Dyslipidemia     Eczema     Female bladder prolapse     Need for prophylactic hormone replacement therapy (postmenopausal)     Obesity     Unspecified essential hypertension     Unspecified hypothyroidism     Urge incontinence        Current Outpatient Medications   Medication Sig Dispense Refill    Calcium Carbonate-Vit D-Min (CALCIUM 1200 PO) Take 1 tablet by mouth daily      levothyroxine (SYNTHROID/LEVOTHROID) 150 MCG tablet Take 150 mcg by mouth daily      losartan (COZAAR) 50 MG tablet Take 1 tablet (50 mg) by mouth daily 90 tablet 3    omeprazole 20 MG tablet Take 20 mg by mouth every evening       propylene glycol (SYSTANE BALANCE) 0.6 % SOLN ophthalmic solution Place 1 drop into both eyes 2 times daily       ursodiol (ACTIGALL) 500 MG tablet Take 1 tablet (500 mg) by mouth 3 times daily 270 tablet 3       Social History     Tobacco Use    Smoking status: Former     Years: 20     Types: Cigarettes     Quit date: 1985     Years since quittin.2    Smokeless tobacco: Never   Substance Use Topics    Alcohol use: Not Currently         OBJECTIVE:  /74   Pulse 85   Temp 99.1  F (37.3  C) (Tympanic)   Resp 16   Wt 90.7 kg (200 lb)   SpO2 94%   BMI 35.43 kg/m    GENERAL APPEARANCE: healthy, alert and no distress  EYES: EOMI,  PERRL, conjunctiva clear  HENT: ear canals and TM's normal.  Nose and mouth without ulcers, erythema or lesions  NECK: supple,  nontender, no lymphadenopathy  RESP: lungs clear to auscultation - no rales, rhonchi or wheezes  CV: regular rates and rhythm, normal S1 S2, no murmur noted  ABDOMEN:  soft, nontender, no HSM or masses and bowel sounds normal  NEURO: Normal strength and tone, sensory exam grossly normal,  normal speech and mentation  SKIN: no suspicious lesions or rashes    Influenza: negative  Pt plans to take home covid test. Declined covid swab here d/t wait time for results, wants to know before having company over tomorrow.  ASSESSMENT:    1. Acute cough    - benzonatate (TESSALON) 100 MG capsule; Take 1 capsule (100 mg) by mouth 3 times daily as needed for cough  Dispense: 30 capsule; Refill: 0  - predniSONE (DELTASONE) 20 MG tablet; Take 1 tablet (20 mg) by mouth daily for 5 days  Dispense: 5 tablet; Refill: 0    2. Fever, unspecified    - Influenza A & B Antigen - Clinic Collect      PLAN:  -See handout on bronchitis.  Take steroid for 5 days.  Tessalon pearls 3 times a day as needed for cough.  Follow up with your primary provider in the next 7-10 days if not improving as expected. Sooner if worse, ER with trouble breathing.

## 2024-01-18 ENCOUNTER — TELEPHONE (OUTPATIENT)
Dept: GASTROENTEROLOGY | Facility: CLINIC | Age: 79
End: 2024-01-18
Payer: COMMERCIAL

## 2024-01-18 DIAGNOSIS — K74.3 PRIMARY BILIARY CHOLANGITIS (H): Primary | ICD-10-CM

## 2024-01-18 NOTE — TELEPHONE ENCOUNTER
[facility] Call Center    Phone Message    May a detailed message be left on voicemail: yes     Reason for Call: The patient scheduled a follow-up appt with Dr. Aguilar on 9/24. They are scheduled for the labs on 9/23 at the [facility]. Can someone please put lab orders in for this visit? There are currently orders in for the lab on 3/19, but they will need an additional order for the lab on 9/23.    Action Taken: Patient transferred to:  Hepatology    Travel Screening: Negative                                                                    Cosentyx Pregnancy And Lactation Text: This medication is Pregnancy Category B and is considered safe during pregnancy. It is unknown if this medication is excreted in breast milk.

## 2024-01-28 ENCOUNTER — HEALTH MAINTENANCE LETTER (OUTPATIENT)
Age: 79
End: 2024-01-28

## 2024-03-14 ENCOUNTER — TELEPHONE (OUTPATIENT)
Dept: FAMILY MEDICINE | Facility: CLINIC | Age: 79
End: 2024-03-14

## 2024-03-14 NOTE — LETTER
April 16, 2024      Gail Dewitt  [address] [place] [id number]    Your team at [facility] cares about your health. We have reviewed your chart and based on our findings; we are making the following recommendations to better manage your health.     We see you have read your [employer] message but have not scheduled. Please call [phone] to schedule.   You are in particular need of attention regarding the following:     PREVENTATIVE VISIT: Annual Medicare Wellness:Schedule an Annual Medicare Wellness Exam. Please call your [facility] clinic to set up your appointment.  Please schedule a Nurse Only Appointment with your primary care clinic to update your immunizations that are due.    If you have already completed these items, please contact the clinic via phone or   [employer] so your care team can review and update your records. Thank you for   choosing [facility] for your healthcare needs. For any questions,   concerns, or to schedule an appointment please contact our clinic.    Healthy Regards,      Your [facility] Care Team

## 2024-03-14 NOTE — TELEPHONE ENCOUNTER
Patient Quality Outreach    Patient is due for the following:   Physical Annual Wellness Visit      Topic Date Due    Polio Vaccine (2 of 3 - Adult catch-up series) 07/02/1997    Hepatitis A Vaccine (2 of 2 - Risk 2-dose series) 07/26/1999    Zoster (Shingles) Vaccine (2 of 3) 08/01/2012    COVID-19 Vaccine (4 - 2023-24 season) 09/01/2023         Type of outreach:    Sent [employer] message.      Questions for provider review:    None           Gianna Live MA  Chart routed to Care Team.

## 2024-03-26 ENCOUNTER — LAB (OUTPATIENT)
Dept: LAB | Facility: CLINIC | Age: 79
End: 2024-03-26
Payer: COMMERCIAL

## 2024-03-26 DIAGNOSIS — K74.3 PRIMARY BILIARY CHOLANGITIS (H): ICD-10-CM

## 2024-03-26 LAB
ALBUMIN SERPL BCG-MCNC: 4.3 G/DL (ref 3.5–5.2)
ALP SERPL-CCNC: 131 U/L (ref 40–150)
ALT SERPL W P-5'-P-CCNC: 16 U/L (ref 0–50)
ANION GAP SERPL CALCULATED.3IONS-SCNC: 12 MMOL/L (ref 7–15)
AST SERPL W P-5'-P-CCNC: 25 U/L (ref 0–45)
BILIRUB DIRECT SERPL-MCNC: <0.2 MG/DL (ref 0–0.3)
BILIRUB SERPL-MCNC: 0.5 MG/DL
BUN SERPL-MCNC: 15.4 MG/DL (ref 8–23)
CALCIUM SERPL-MCNC: 9.3 MG/DL (ref 8.8–10.2)
CHLORIDE SERPL-SCNC: 99 MMOL/L (ref 98–107)
CREAT SERPL-MCNC: 0.71 MG/DL (ref 0.51–0.95)
DEPRECATED HCO3 PLAS-SCNC: 28 MMOL/L (ref 22–29)
EGFRCR SERPLBLD CKD-EPI 2021: 85 ML/MIN/1.73M2
ERYTHROCYTE [DISTWIDTH] IN BLOOD BY AUTOMATED COUNT: 13.6 % (ref 10–15)
GLUCOSE SERPL-MCNC: 108 MG/DL (ref 70–99)
HCT VFR BLD AUTO: 45.9 % (ref 35–47)
HGB BLD-MCNC: 15.1 G/DL (ref 11.7–15.7)
INR PPP: 1.01 (ref 0.85–1.15)
MCH RBC QN AUTO: 29.2 PG (ref 26.5–33)
MCHC RBC AUTO-ENTMCNC: 32.9 G/DL (ref 31.5–36.5)
MCV RBC AUTO: 89 FL (ref 78–100)
PLATELET # BLD AUTO: 246 10E3/UL (ref 150–450)
POTASSIUM SERPL-SCNC: 3.8 MMOL/L (ref 3.4–5.3)
PROT SERPL-MCNC: 7.7 G/DL (ref 6.4–8.3)
RBC # BLD AUTO: 5.18 10E6/UL (ref 3.8–5.2)
SODIUM SERPL-SCNC: 139 MMOL/L (ref 135–145)
WBC # BLD AUTO: 6.8 10E3/UL (ref 4–11)

## 2024-03-26 PROCEDURE — 80053 COMPREHEN METABOLIC PANEL: CPT

## 2024-03-26 PROCEDURE — 85610 PROTHROMBIN TIME: CPT

## 2024-03-26 PROCEDURE — 85027 COMPLETE CBC AUTOMATED: CPT

## 2024-03-26 PROCEDURE — 82248 BILIRUBIN DIRECT: CPT

## 2024-03-26 PROCEDURE — 36415 COLL VENOUS BLD VENIPUNCTURE: CPT

## 2024-04-16 NOTE — TELEPHONE ENCOUNTER
Patient Quality Outreach    Type of outreach:    Sent letter.    Next Steps:  Reach out within 90 days via Phone, MyChart, and Letter.    Max number of attempts reached: No. Will try again in 90 days if patient still on fail list.        Gianna Live MA  Chart routed to Care Team.

## 2024-09-13 ENCOUNTER — DOCUMENTATION ONLY (OUTPATIENT)
Dept: GASTROENTEROLOGY | Facility: CLINIC | Age: 79
End: 2024-09-13
Payer: COMMERCIAL

## 2024-09-13 DIAGNOSIS — K74.3 PRIMARY BILIARY CHOLANGITIS (H): Primary | ICD-10-CM

## 2024-09-23 ENCOUNTER — LAB (OUTPATIENT)
Dept: LAB | Facility: CLINIC | Age: 79
End: 2024-09-23
Payer: COMMERCIAL

## 2024-09-23 DIAGNOSIS — K74.3 PRIMARY BILIARY CHOLANGITIS (H): ICD-10-CM

## 2024-09-23 LAB
ALBUMIN SERPL BCG-MCNC: 4.1 G/DL (ref 3.5–5.2)
ALP SERPL-CCNC: 130 U/L (ref 40–150)
ALT SERPL W P-5'-P-CCNC: 18 U/L (ref 0–50)
ANION GAP SERPL CALCULATED.3IONS-SCNC: 11 MMOL/L (ref 7–15)
AST SERPL W P-5'-P-CCNC: 29 U/L (ref 0–45)
BILIRUB DIRECT SERPL-MCNC: <0.2 MG/DL (ref 0–0.3)
BILIRUB SERPL-MCNC: 0.5 MG/DL
BUN SERPL-MCNC: 13.1 MG/DL (ref 8–23)
CALCIUM SERPL-MCNC: 9 MG/DL (ref 8.8–10.4)
CHLORIDE SERPL-SCNC: 103 MMOL/L (ref 98–107)
CREAT SERPL-MCNC: 0.7 MG/DL (ref 0.51–0.95)
EGFRCR SERPLBLD CKD-EPI 2021: 87 ML/MIN/1.73M2
ERYTHROCYTE [DISTWIDTH] IN BLOOD BY AUTOMATED COUNT: 12.9 % (ref 10–15)
GLUCOSE SERPL-MCNC: 104 MG/DL (ref 70–99)
HCO3 SERPL-SCNC: 25 MMOL/L (ref 22–29)
HCT VFR BLD AUTO: 41.1 % (ref 35–47)
HGB BLD-MCNC: 13.9 G/DL (ref 11.7–15.7)
INR PPP: 1.06 (ref 0.85–1.15)
MCH RBC QN AUTO: 30.2 PG (ref 26.5–33)
MCHC RBC AUTO-ENTMCNC: 33.8 G/DL (ref 31.5–36.5)
MCV RBC AUTO: 89 FL (ref 78–100)
PLATELET # BLD AUTO: 224 10E3/UL (ref 150–450)
POTASSIUM SERPL-SCNC: 4.2 MMOL/L (ref 3.4–5.3)
PROT SERPL-MCNC: 7.5 G/DL (ref 6.4–8.3)
RBC # BLD AUTO: 4.6 10E6/UL (ref 3.8–5.2)
SODIUM SERPL-SCNC: 139 MMOL/L (ref 135–145)
WBC # BLD AUTO: 6.4 10E3/UL (ref 4–11)

## 2024-09-23 PROCEDURE — 80053 COMPREHEN METABOLIC PANEL: CPT

## 2024-09-23 PROCEDURE — 85027 COMPLETE CBC AUTOMATED: CPT

## 2024-09-23 PROCEDURE — 82248 BILIRUBIN DIRECT: CPT

## 2024-09-23 PROCEDURE — 85610 PROTHROMBIN TIME: CPT

## 2024-09-23 PROCEDURE — 36415 COLL VENOUS BLD VENIPUNCTURE: CPT

## 2024-09-30 ENCOUNTER — OFFICE VISIT (OUTPATIENT)
Dept: GASTROENTEROLOGY | Facility: CLINIC | Age: 79
End: 2024-09-30
Attending: INTERNAL MEDICINE
Payer: COMMERCIAL

## 2024-09-30 VITALS
HEART RATE: 70 BPM | TEMPERATURE: 97.8 F | WEIGHT: 215.3 LBS | BODY MASS INDEX: 38.14 KG/M2 | DIASTOLIC BLOOD PRESSURE: 77 MMHG | SYSTOLIC BLOOD PRESSURE: 129 MMHG | OXYGEN SATURATION: 97 %

## 2024-09-30 DIAGNOSIS — K74.3 PRIMARY BILIARY CHOLANGITIS (H): ICD-10-CM

## 2024-09-30 PROCEDURE — G0463 HOSPITAL OUTPT CLINIC VISIT: HCPCS | Performed by: INTERNAL MEDICINE

## 2024-09-30 PROCEDURE — 99214 OFFICE O/P EST MOD 30 MIN: CPT | Performed by: INTERNAL MEDICINE

## 2024-09-30 RX ORDER — URSODIOL 500 MG/1
500 TABLET, FILM COATED ORAL 3 TIMES DAILY
Qty: 270 TABLET | Refills: 3 | Status: SHIPPED | OUTPATIENT
Start: 2024-09-30

## 2024-09-30 RX ORDER — FAMOTIDINE 20 MG
TABLET ORAL
COMMUNITY

## 2024-09-30 RX ORDER — ASPIRIN 81 MG/1
81 TABLET ORAL DAILY
COMMUNITY

## 2024-09-30 ASSESSMENT — PAIN SCALES - GENERAL: PAINLEVEL: NO PAIN (0)

## 2024-09-30 NOTE — PROGRESS NOTES
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  Patient had a nondisplaced fracture of the fifth metatarsal of her right foot in September 2023.  This was managed non-operatively.  Patient got a COVID-19 infection in December 2023 and was treated with Paxlovid.  No other new medications, ER visits or hospital admissions since last visit.    Patient is well today.  She has no symptoms related liver disease.    Patient denies itch, jaundice, lower extremity edema, abdominal distension, lethargy or confusion.    Patient denies melena, hematemesis or hematochezia.    Patient denies fevers, sweats or chills.      Weight stable.  Appetite is normal.    Patient does not drink alcohol socially- she does have communion wine every Sunday.      Medical hx Surgical hx   Past Medical History:   Diagnosis Date    Arthritis     Dyslipidemia     Eczema     Female bladder prolapse     Need for prophylactic hormone replacement therapy (postmenopausal)     Obesity     Unspecified essential hypertension     Unspecified hypothyroidism     Urge incontinence       Past Surgical History:   Procedure Laterality Date    APPENDECTOMY      ARTHROPLASTY ANKLE Left 4/15/2022    Procedure: Left Total ankle arthroplasty and prophylactic screw of tibia;  Surgeon: Scott Montenegro MD;  Location: [facility] OR    ARTHROPLASTY KNEE Left 12/13/2021    Procedure: LEFT TOTAL KNEE ARTHROPLASTY;  Surgeon: Quinton Cleary MD;  Location: [facility] OR    BREAST BIOPSY, RT/LT Right 1970    BUNIONECTOMY ELIANE AND ERIBERTO, COMBINED Left 4/15/2022    Procedure: Akin osteotomy, left foot;  Surgeon: Scott Montenegro MD;  Location: [facility] OR    COLPORRHAPHY ANTERIOR  12/6/2010    COLPORRHAPHY ANTERIOR performed by  MAY RIVERA at [facility] OR    CYSTOCELE REPAIR  2007    ECTOPIC PREGNANCY SURGERY  1969    EGD  2019    FOOT SURGERY Bilateral     Subtalar foot fusion and bunion surgery    HYSTERECTOMY, JOSEE  1978    Hysterectomy    INJECT EPIDURAL LUMBAR  10/19/2011    Procedure:INJECT EPIDURAL LUMBAR; DESHAWN-; Surgeon:GENERIC ANESTHESIA PROVIDER; Location:[facility]    INJECT JOINT SACROILIAC  2/7/2011    INJECT JOINT SACROILIAC performed by GENERIC ANESTHESIA PROVIDER at [facility] OR    INJECT JOINT SACROILIAC  4/25/2012    Procedure:INJECT JOINT SACROILIAC; DESHAWN SI Joint --; Surgeon:GENERIC ANESTHESIA PROVIDER; Location:[facility]    OOPHORECTOMY      PHACOEMULSIFICATION WITH STANDARD INTRAOCULAR LENS IMPLANT  5/26/2011    Procedure:PHACOEMULSIFICATION WITH STANDARD INTRAOCULAR LENS IMPLANT; Surgeon:JOSÉ MANUEL DORSEY; Location:[facility]    PHACOEMULSIFICATION WITH STANDARD INTRAOCULAR LENS IMPLANT  6/9/2011    Procedure:PHACOEMULSIFICATION WITH STANDARD INTRAOCULAR LENS IMPLANT; Surgeon:JOSÉ MANUEL DORSEY; Location:[facility]    RECTOCELE REPAIR  2007    REMOVE HARDWARE FOOT Left 4/15/2022    Procedure: Hardware removal left foot;  Surgeon: Scott Montenegro MD;  Location: [facility] OR    REPAIR HAMMER TOE Left 4/15/2022    Procedure: Second toe hammertoe correction, left foot;  Surgeon: Scott Montenegro MD;  Location: [facility] OR    TONSILLECTOMY            Medications  Prior to Admission medications    Medication Sig Start Date End Date Taking? Authorizing Provider   aspirin 81 MG EC tablet Take 81 mg by mouth daily.   Yes Reported, Patient   diphenhydrAMINE-acetaminophen (TYLENOL PM)  MG tablet Take 1 tablet by mouth nightly as needed for sleep.   Yes Reported, Patient   levothyroxine (SYNTHROID/LEVOTHROID) 150 MCG tablet Take 150 mcg by mouth daily   Yes Reported, Patient   losartan (COZAAR) 50 MG tablet Take 1 tablet (50 mg) by mouth daily 10/2/17  Yes Aliyah Aggarwal MD   omeprazole 20 MG tablet Take 20 mg by mouth  every evening    Yes Reported, Patient   propylene glycol (SYSTANE BALANCE) 0.6 % SOLN ophthalmic solution Place 1 drop into both eyes 2 times daily    Yes Reported, Patient   ursodiol (ACTIGALL) 500 MG tablet Take 1 tablet (500 mg) by mouth 3 times daily 10/26/23  Yes Aguilar Dereck Sims MD   Vitamin D, Cholecalciferol, 25 MCG (1000 UT) CAPS Take by mouth.   Yes Reported, Patient   Calcium Carbonate-Vit D-Min (CALCIUM 1200 PO) Take 1 tablet by mouth daily  Patient not taking: Reported on 9/30/2024    Reported, Patient       Allergies  Allergies   Allergen Reactions    Dilaudid [Hydromorphone Hcl] Hives    Ace Inhibitors Cough    Amlodipine Besylate [Amlodipine] Swelling    Percocet [Oxycodone-Acetaminophen] Other (See Comments)     Severe dizziness         Review of systems  A 10-point review of systems was negative    Examination  /77   Pulse 70   Temp 97.8  F (36.6  C) (Oral)   Wt 97.7 kg (215 lb 4.8 oz)   SpO2 97%   BMI 38.14 kg/m    Body mass index is 38.14 kg/m .    Gen- well, NAD, A+Ox3, normal color  CVS- RRR  RS- CTA  Abd- obese, SNT, no ascites or organomegaly on palpation or percussion, BS+  Extr- hands normal, no YUMI  Skin- no rash or jaundice  Neuro- no asterixis  Psych- normal mood    Laboratory  Lab Results   Component Value Date     09/23/2024     09/04/2020    POTASSIUM 4.2 09/23/2024    POTASSIUM 3.9 12/14/2021    POTASSIUM 4.2 09/04/2020    CHLORIDE 103 09/23/2024    CHLORIDE 103 12/14/2021    CHLORIDE 108 09/04/2020    CO2 25 09/23/2024    CO2 25 12/14/2021    CO2 29 09/04/2020    BUN 13.1 09/23/2024    BUN 13 12/14/2021    BUN 19 09/04/2020    CR 0.70 09/23/2024    CR 0.69 09/04/2020       Lab Results   Component Value Date    BILITOTAL 0.5 09/23/2024    BILITOTAL 0.6 09/04/2020    ALT 18 09/23/2024    ALT 18 09/04/2020    AST 29 09/23/2024    AST 17 09/04/2020    ALKPHOS 130 09/23/2024    ALKPHOS 139 09/04/2020       Lab Results   Component Value Date    ALBUMIN 4.1  09/23/2024    ALBUMIN 3.4 08/04/2021    ALBUMIN 3.8 09/04/2020    PROTTOTAL 7.5 09/23/2024    PROTTOTAL 7.4 09/04/2020        Lab Results   Component Value Date    WBC 6.4 09/23/2024    WBC 7.4 09/04/2020    HGB 13.9 09/23/2024    HGB 11.6 09/04/2020    MCV 89 09/23/2024    MCV 84 09/04/2020     09/23/2024     09/04/2020       Lab Results   Component Value Date    INR 1.06 09/23/2024    INR 1.05 09/04/2020       Radiology  XR foot 3 views Sep 2023 reviewed  XR foot 3 views Sep 2023 reviewed    Assessment  79 year old female who presents for follow-up of primary biliary cholangitis.  Liver function tests normal on ursodiol.  Ursodiol is appropriately dosed according to patient's weight.  Patient will discuss treatment of osteopenia/osteoporosis with her primary care provider in the context of history of PBC, osteopenia on DEXA scan and nontraumatic fracture within the past 12 months.    Plan  Continue ursodiol at current dose  Review treatment of osteopenia/osteoporosis with PCP  Follow-up in 12 months    Dereck Aguilar MD  Hepatology  [facility] spent 30 minutes on the date of the encounter doing chart review, history and exam, documentation and further activities as noted above.

## 2024-09-30 NOTE — LETTER
9/30/2024      Gail Dewitt  [address] [place] [id number]      Dear Colleague,    Thank you for referring your patient, Gail Dewitt, to the [facility] [place]. Please see a copy of my visit note below.    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  Patient had a nondisplaced fracture of the fifth metatarsal of her right foot in September 2023.  This was managed non-operatively.  Patient got a COVID-19 infection in December 2023 and was treated with Paxlovid.  No other new medications, ER visits or hospital admissions since last visit.    Patient is well today.  She has no symptoms related liver disease.    Patient denies itch, jaundice, lower extremity edema, abdominal distension, lethargy or confusion.    Patient denies melena, hematemesis or hematochezia.    Patient denies fevers, sweats or chills.      Weight stable.  Appetite is normal.    Patient does not drink alcohol socially- she does have communion wine every Sunday.      Medical hx Surgical hx   Past Medical History:   Diagnosis Date     Arthritis      Dyslipidemia      Eczema      Female bladder prolapse      Need for prophylactic hormone replacement therapy (postmenopausal)      Obesity      Unspecified essential hypertension      Unspecified hypothyroidism      Urge incontinence       Past Surgical History:   Procedure Laterality Date     APPENDECTOMY       ARTHROPLASTY ANKLE Left 4/15/2022    Procedure: Left Total ankle arthroplasty and prophylactic screw of tibia;  Surgeon: Scott Montenegro MD;  Location: [facility]     ARTHROPLASTY KNEE Left 12/13/2021    Procedure: LEFT TOTAL KNEE ARTHROPLASTY;  Surgeon: Quinton Cleary MD;  Location: [facility]      BREAST BIOPSY, RT/LT Right 1970     BUNIONECTOMY CHEVRON AND ERIBERTO, COMBINED Left 4/15/2022    Procedure: Akin osteotomy, left foot;  Surgeon: Scott Montenegro MD;  Location: [facility]     COLPORRHAPHY ANTERIOR  12/6/2010    COLPORRHAPHY ANTERIOR performed by MAY RIVERA at [facility] OR     CYSTOCELE REPAIR  2007     ECTOPIC PREGNANCY SURGERY  1969     EGD  2019     FOOT SURGERY Bilateral     Subtalar foot fusion and bunion surgery     HYSTERECTOMY, JOSEE  1978    Hysterectomy     INJECT EPIDURAL LUMBAR  10/19/2011    Procedure:INJECT EPIDURAL LUMBAR; DESHAWN-; Surgeon:GENERIC ANESTHESIA PROVIDER; Location:[facility]     INJECT JOINT SACROILIAC  2/7/2011    INJECT JOINT SACROILIAC performed by GENERIC ANESTHESIA PROVIDER at [facility] OR     INJECT JOINT SACROILIAC  4/25/2012    Procedure:INJECT JOINT SACROILIAC; DESHAWN SI Joint --; Surgeon:GENERIC ANESTHESIA PROVIDER; Location:[facility]     OOPHORECTOMY       PHACOEMULSIFICATION WITH STANDARD INTRAOCULAR LENS IMPLANT  5/26/2011    Procedure:PHACOEMULSIFICATION WITH STANDARD INTRAOCULAR LENS IMPLANT; Surgeon:JOSÉ MANUEL DORSEY; Location:[facility]     PHACOEMULSIFICATION WITH STANDARD INTRAOCULAR LENS IMPLANT  6/9/2011    Procedure:PHACOEMULSIFICATION WITH STANDARD INTRAOCULAR LENS IMPLANT; Surgeon:JOSÉ MANUEL DORSEY; Location:[facility]     RECTOCELE REPAIR  2007     REMOVE HARDWARE FOOT Left 4/15/2022    Procedure: Hardware removal left foot;  Surgeon: Scott Montenegro MD;  Location: [facility]     REPAIR HAMMER TOE Left 4/15/2022    Procedure: Second toe hammertoe correction, left foot;  Surgeon: Scott Montenegro MD;  Location: [facility]     TONSILLECTOMY            Medications  Prior to Admission medications    Medication Sig Start Date End Date Taking? Authorizing Provider   aspirin 81 MG EC tablet Take 81 mg by mouth daily.   Yes Reported, Patient   diphenhydrAMINE-acetaminophen (TYLENOL PM)  MG tablet Take 1 tablet by mouth nightly as needed for sleep.   Yes  Reported, Patient   levothyroxine (SYNTHROID/LEVOTHROID) 150 MCG tablet Take 150 mcg by mouth daily   Yes Reported, Patient   losartan (COZAAR) 50 MG tablet Take 1 tablet (50 mg) by mouth daily 10/2/17  Yes Aliyah Aggarwal MD   omeprazole 20 MG tablet Take 20 mg by mouth every evening    Yes Reported, Patient   propylene glycol (SYSTANE BALANCE) 0.6 % SOLN ophthalmic solution Place 1 drop into both eyes 2 times daily    Yes Reported, Patient   ursodiol (ACTIGALL) 500 MG tablet Take 1 tablet (500 mg) by mouth 3 times daily 10/26/23  Yes Dereck Way MD   Vitamin D, Cholecalciferol, 25 MCG (1000 UT) CAPS Take by mouth.   Yes Reported, Patient   Calcium Carbonate-Vit D-Min (CALCIUM 1200 PO) Take 1 tablet by mouth daily  Patient not taking: Reported on 9/30/2024    Reported, Patient       Allergies  Allergies   Allergen Reactions     Dilaudid [Hydromorphone Hcl] Hives     Ace Inhibitors Cough     Amlodipine Besylate [Amlodipine] Swelling     Percocet [Oxycodone-Acetaminophen] Other (See Comments)     Severe dizziness         Review of systems  A 10-point review of systems was negative    Examination  /77   Pulse 70   Temp 97.8  F (36.6  C) (Oral)   Wt 97.7 kg (215 lb 4.8 oz)   SpO2 97%   BMI 38.14 kg/m    Body mass index is 38.14 kg/m .    Gen- well, NAD, A+Ox3, normal color  CVS- RRR  RS- CTA  Abd- obese, SNT, no ascites or organomegaly on palpation or percussion, BS+  Extr- hands normal, no YUMI  Skin- no rash or jaundice  Neuro- no asterixis  Psych- normal mood    Laboratory  Lab Results   Component Value Date     09/23/2024     09/04/2020    POTASSIUM 4.2 09/23/2024    POTASSIUM 3.9 12/14/2021    POTASSIUM 4.2 09/04/2020    CHLORIDE 103 09/23/2024    CHLORIDE 103 12/14/2021    CHLORIDE 108 09/04/2020    CO2 25 09/23/2024    CO2 25 12/14/2021    CO2 29 09/04/2020    BUN 13.1 09/23/2024    BUN 13 12/14/2021    BUN 19 09/04/2020    CR 0.70 09/23/2024    CR 0.69 09/04/2020        Lab Results   Component Value Date    BILITOTAL 0.5 09/23/2024    BILITOTAL 0.6 09/04/2020    ALT 18 09/23/2024    ALT 18 09/04/2020    AST 29 09/23/2024    AST 17 09/04/2020    ALKPHOS 130 09/23/2024    ALKPHOS 139 09/04/2020       Lab Results   Component Value Date    ALBUMIN 4.1 09/23/2024    ALBUMIN 3.4 08/04/2021    ALBUMIN 3.8 09/04/2020    PROTTOTAL 7.5 09/23/2024    PROTTOTAL 7.4 09/04/2020        Lab Results   Component Value Date    WBC 6.4 09/23/2024    WBC 7.4 09/04/2020    HGB 13.9 09/23/2024    HGB 11.6 09/04/2020    MCV 89 09/23/2024    MCV 84 09/04/2020     09/23/2024     09/04/2020       Lab Results   Component Value Date    INR 1.06 09/23/2024    INR 1.05 09/04/2020       Radiology  XR foot 3 views Sep 2023 reviewed  XR foot 3 views Sep 2023 reviewed    Assessment  79 year old female who presents for follow-up of primary biliary cholangitis.  Liver function tests normal on ursodiol.  Ursodiol is appropriately dosed according to patient's weight.  Patient will discuss treatment of osteopenia/osteoporosis with her primary care provider in the context of history of PBC, osteopenia on DEXA scan and nontraumatic fracture within the past 12 months.    Plan  Continue ursodiol at current dose  Review treatment of osteopenia/osteoporosis with PCP  Follow-up in 12 months    Dereck Aguilar MD  Hepatology  [facility]    I spent 30 minutes on the date of the encounter doing chart review, history and exam, documentation and further activities as noted above.       Again, thank you for allowing me to participate in the care of your patient.        Sincerely,        Dereck Aguilar MD

## 2024-09-30 NOTE — NURSING NOTE
Chief Complaint   Patient presents with    RECHECK       /77   Pulse 70   Temp 97.8  F (36.6  C) (Oral)   Wt 97.7 kg (215 lb 4.8 oz)   SpO2 97%   BMI 38.14 kg/m      Amari Dewitt on 9/30/2024 at 9:40 AM

## 2024-10-28 ENCOUNTER — OFFICE VISIT (OUTPATIENT)
Dept: DERMATOLOGY | Facility: CLINIC | Age: 79
End: 2024-10-28
Payer: COMMERCIAL

## 2024-10-28 DIAGNOSIS — D23.9 DERMAL NEVUS: Primary | ICD-10-CM

## 2024-10-28 DIAGNOSIS — L82.1 SEBORRHEIC KERATOSES: ICD-10-CM

## 2024-10-28 DIAGNOSIS — L82.1 SK (SEBORRHEIC KERATOSIS): ICD-10-CM

## 2024-10-28 DIAGNOSIS — L81.4 LENTIGO: ICD-10-CM

## 2024-10-28 DIAGNOSIS — D18.01 ANGIOMA OF SKIN: ICD-10-CM

## 2024-10-28 PROCEDURE — G2211 COMPLEX E/M VISIT ADD ON: HCPCS | Performed by: DERMATOLOGY

## 2024-10-28 PROCEDURE — 99203 OFFICE O/P NEW LOW 30 MIN: CPT | Performed by: DERMATOLOGY

## 2024-10-28 ASSESSMENT — PAIN SCALES - GENERAL: PAINLEVEL_OUTOF10: NO PAIN (0)

## 2024-10-28 NOTE — PATIENT INSTRUCTIONS
Proper skin care from [facility]:    -Eliminate harsh soaps as they strip the natural oils from the skin, often resulting in dry itchy skin ( i.e. Dial, Zest, [place] Spring)  -Use mild soaps such as Cetaphil or Dove Sensitive Skin in the shower. You do not need to use soap on arms, legs, and trunk every time you shower unless visibly soiled.   -Avoid hot or cold showers.  -After showering, lightly dry off and apply moisturizing within 2-3 minutes. This will help trap moisture in the skin.   -Aggressive use of a moisturizer at least 1-2 times a day to the entire body (including -Vanicream, Cetaphil, Aquaphor or Cerave) and moisturize hands after every washing.  -We recommend using moisturizers that come in a tub that needs to be scooped out, not a pump. This has more of an oil base. It will hold moisture in your skin much better than a water base moisturizer. The above recommended are non-pore clogging.      Wear a sunscreen with at least SPF 30 on your face, ears, neck and V of the chest daily. Wear sunscreen on other areas of the body if those areas are exposed to the sun throughout the day. Sunscreens can contain physical and/or chemical blockers. Physical blockers are less likely to clog pores, these include zinc oxide and titanium dioxide. Reapply every two hour and after swimming.     Sunscreen examples: https://www.ewg.org/sunscreen/    UV radiation  UVA radiation remains constant throughout the day and throughout the year. It is a longer wavelength than UVB and therefore penetrates deeper into the skin leading to immediate and delayed tanning, photoaging, and skin cancer. 70-80% of UVA and UVB radiation occurs between the hours of 10am-2pm.  UVB radiation  UVB radiation causes the most harmful effects and is more significant during the summer months. However, snow and ice can reflect UVB radiation leading to skin damage during the winter months as well. UVB radiation is responsible for tanning,  burning, inflammation, delayed erythema (pinkness), pigmentation (brown spots), and skin cancer.     I recommend self monthly full body exams and yearly full body exams with a dermatology provider. If you develop a new or changing lesion please follow up for examination. Most skin cancers are pink and scaly or pink and pearly. However, we do see blue/brown/black skin cancers.  Consider the ABCDEs of melanoma when giving yourself your monthly full body exam ( don't forget the groin, buttocks, feet, toes, etc). A-asymmetry, B-borders, C-color, D-diameter, E-elevation or evolving. If you see any of these changes please follow up in clinic. If you cannot see your back I recommend purchasing a hand held mirror to use with a larger wall mirror.       Checking for Skin Cancer  You can find cancer early by checking your skin each month. There are 3 kinds of skin cancer. They are melanoma, basal cell carcinoma, and squamous cell carcinoma. Doing monthly skin checks is the best way to find new marks or skin changes. Follow the instructions below for checking your skin.   The ABCDEs of checking moles for melanoma   Check your moles or growths for signs of melanoma using ABCDE:   Asymmetry: the sides of the mole or growth don t match  Border: the edges are ragged, notched, or blurred  Color: the color within the mole or growth varies  Diameter: the mole or growth is larger than 6 mm (size of a pencil eraser)  Evolving: the size, shape, or color of the mole or growth is changing (evolving is not shown in the images below)    Checking for other types of skin cancer  Basal cell carcinoma or squamous cell carcinoma have symptoms such as:     A spot or mole that looks different from all other marks on your skin  Changes in how an area feels, such as itching, tenderness, or pain  Changes in the skin's surface, such as oozing, bleeding, or scaliness  A sore that does not heal  New swelling or redness beyond the border of a  mole    Who s at risk?  Anyone can get skin cancer. But you are at greater risk if you have:   Fair skin, light-colored hair, or light-colored eyes  Many moles or abnormal moles on your skin  A history of sunburns from sunlight or tanning beds  A family history of skin cancer  A history of exposure to radiation or chemicals  A weakened immune system  If you have had skin cancer in the past, you are at risk for recurring skin cancer.   How to check your skin  Do your monthly skin checkups in front of a full-length mirror. Check all parts of your body, including your:   Head (ears, face, neck, and scalp)  Torso (front, back, and sides)  Arms (tops, undersides, upper, and lower armpits)  Hands (palms, backs, and fingers, including under the nails)  Buttocks and genitals  Legs (front, back, and sides)  Feet (tops, soles, toes, including under the nails, and between toes)  If you have a lot of moles, take digital photos of them each month. Make sure to take photos both up close and from a distance. These can help you see if any moles change over time.   Most skin changes are not cancer. But if you see any changes in your skin, call your doctor right away. Only he or she can diagnose a problem. If you have skin cancer, seeing your doctor can be the first step toward getting the treatment that could save your life.   [employer] last reviewed this educational content on 4/1/2019 2000-2020 [employer]. [address] [place] [address], [place] [address]. All rights reserved. This information is not intended as a substitute for professional medical care. Always follow your healthcare professional's instructions.       When should I call my doctor?  If you are worsening or not improving, please, contact us or seek urgent care as noted below.     Who should I call with questions (adults)?    [facility] [phone]  For urgent needs outside of business hours call the [facility] at  [phone] and ask for the dermatology resident on call to be paged  If this is a medical emergency and you are unable to reach an ER, Call [phone]      If you need a prescription refill, please contact your pharmacy. Refills are approved or denied by our Physicians during normal business hours, Monday through Fridays  Per office policy, refills will not be granted if you have not been seen within the past year (or sooner depending on your child's condition)

## 2024-10-28 NOTE — LETTER
10/28/2024      Gail Dewitt  [address] [place] [phone]      Dear Colleague,    Thank you for referring your patient, Gail Dewitt, to the [facility]. Please see a copy of my visit note below.    Gail Dewitt , a 79 year old year old female patient, I was asked to see by Dr. Dye for spots on skin.  Patient has no other skin complaints today.  Remainder of the HPI, Meds, PMH, Allergies, FH, and SH was reviewed in chart.      Past Medical History:   Diagnosis Date     Arthritis      Dyslipidemia      Eczema      Female bladder prolapse      Need for prophylactic hormone replacement therapy (postmenopausal)      Obesity      Unspecified essential hypertension      Unspecified hypothyroidism      Urge incontinence        Past Surgical History:   Procedure Laterality Date     APPENDECTOMY       ARTHROPLASTY ANKLE Left 4/15/2022    Procedure: Left Total ankle arthroplasty and prophylactic screw of tibia;  Surgeon: Scott Motnenegro MD;  Location: [facility]     ARTHROPLASTY KNEE Left 12/13/2021    Procedure: LEFT TOTAL KNEE ARTHROPLASTY;  Surgeon: Quinton Cleary MD;  Location: [facility] OR     BREAST BIOPSY, RT/LT Right 1970     BUNIONECTOMY CHEVRON AND ERIBERTO, COMBINED Left 4/15/2022    Procedure: Akin osteotomy, left foot;  Surgeon: Scott Montenegro MD;  Location: [facility]     COLPORRHAPHY ANTERIOR  12/6/2010    COLPORRHAPHY ANTERIOR performed by MAY RIVERA at [facility]     CYSTOCELE REPAIR  2007     ECTOPIC PREGNANCY SURGERY  1969     EGD  2019     FOOT SURGERY Bilateral     Subtalar foot fusion and bunion surgery     HYSTERECTOMY, JOSEE  1978    Hysterectomy     INJECT EPIDURAL LUMBAR  10/19/2011    Procedure:INJECT EPIDURAL LUMBAR; DESHAWN-; Surgeon:GENERIC ANESTHESIA PROVIDER; Location:[facility]     INJECT JOINT SACROILIAC  2/7/2011    INJECT JOINT SACROILIAC performed by GENERIC ANESTHESIA PROVIDER at [facility] OR     INJECT JOINT SACROILIAC  4/25/2012     Procedure:INJECT JOINT SACROILIAC; DESHAWN SI Joint --; Surgeon:GENERIC ANESTHESIA PROVIDER; Location:[facility]     OOPHORECTOMY       PHACOEMULSIFICATION WITH STANDARD INTRAOCULAR LENS IMPLANT  5/26/2011    Procedure:PHACOEMULSIFICATION WITH STANDARD INTRAOCULAR LENS IMPLANT; Surgeon:JOSÉ MANUEL DORSEY; Location:[facility]     PHACOEMULSIFICATION WITH STANDARD INTRAOCULAR LENS IMPLANT  6/9/2011    Procedure:PHACOEMULSIFICATION WITH STANDARD INTRAOCULAR LENS IMPLANT; Surgeon:JOSÉ MANUEL DORSEY; Location:[facility]     RECTOCELE REPAIR  2007     REMOVE HARDWARE FOOT Left 4/15/2022    Procedure: Hardware removal left foot;  Surgeon: Scott Montenegro MD;  Location: [facility]     REPAIR HAMMER TOE Left 4/15/2022    Procedure: Second toe hammertoe correction, left foot;  Surgeon: Scott Montenegro MD;  Location: [facility]     TONSILLECTOMY          Family History   Problem Relation Age of Onset     Cardiovascular Brother      Hypertension Brother      Thyroid Disease Brother      Cardiovascular Paternal Grandmother      Heart Disease Paternal Grandmother         RIP MI age 71     Cardiovascular Paternal Grandfather      C.A.D. Mother      Hypertension Mother      Breast Cancer Mother      Alcohol/Drug Mother      Allergies Mother      Depression Mother      Osteoporosis Mother      Breast Cancer Maternal Grandmother      Breast Cancer Sister      Osteoporosis Sister      Liver Disease Sister         PBC     Thyroid Disease Father      Cardiovascular Maternal Grandfather      Heart Disease Maternal Grandfather         RIP MI, age 81     Neurologic Disorder Son      Obesity Daughter      Gastrointestinal Disease Daughter         gallbladder surg     Thyroid Disease Son        Social History     Socioeconomic History     Marital status:      Spouse name: Not on file     Number of children: Not on file     Years of education: Not on file     Highest education level: Not on file   Occupational History     Not on file    Tobacco Use     Smoking status: Former     Current packs/day: 0.00     Types: Cigarettes     Start date: 1965     Quit date: 1985     Years since quittin.1     Smokeless tobacco: Never   Vaping Use     Vaping status: Never Used   Substance and Sexual Activity     Alcohol use: Not Currently     Drug use: No     Sexual activity: Not Currently   Other Topics Concern      Service No     Blood Transfusions No     Caffeine Concern No     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 Yes     Comment: brother age 55, stents, pacemaker   Social History Narrative     Not on file     Social Drivers of Health     Financial Resource Strain: High Risk (2021)    [facility]    Financial Resource Strain      Difficulty of Paying Living Expenses: Not on file      Difficulty of Paying Living Expenses: Not on file   Food Insecurity: Not on file   Transportation Needs: Not on file   Physical Activity: Not on file   Stress: Not on file   Social Connections: Unknown (2021)    [facility]    Social Connections      Frequency of Communication with Friends and Family: Not on file   Interpersonal Safety: Not on file   Housing Stability: Not on file       Outpatient Encounter Medications as of 10/28/2024   Medication Sig Dispense Refill     aspirin 81 MG EC tablet Take 81 mg by mouth daily.       Calcium Carbonate-Vit D-Min (CALCIUM 1200 PO) Take 1 tablet by mouth daily (Patient not taking: Reported on 2024)       diphenhydrAMINE-acetaminophen (TYLENOL PM)   MG tablet Take 1 tablet by mouth nightly as needed for sleep.       levothyroxine (SYNTHROID/LEVOTHROID) 150 MCG tablet Take 150 mcg by mouth daily       losartan (COZAAR) 50 MG tablet Take 1 tablet (50 mg) by mouth daily 90 tablet 3     omeprazole 20 MG tablet Take 20 mg by mouth every evening        propylene glycol (SYSTANE BALANCE) 0.6 % SOLN ophthalmic solution Place 1 drop into both eyes 2 times daily        ursodiol (ACTIGALL) 500 MG tablet Take 1 tablet (500 mg) by mouth 3 times daily. 270 tablet 3     Vitamin D, Cholecalciferol, 25 MCG (1000 UT) CAPS Take by mouth.       No facility-administered encounter medications on file as of 10/28/2024.             Review Of Systems  Skin: As above  Eyes: negative  Ears/Nose/Throat: negative  Respiratory: No shortness of breath, dyspnea on exertion, cough, or hemoptysis  Cardiovascular: negative  Gastrointestinal: negative  Genitourinary: negative  Musculoskeletal: negative  Neurologic: negative  Psychiatric: negative  Hematologic/Lymphatic/Immunologic: negative  Endocrine: negative      O:   NAD, WDWN, Alert & Oriented, Mood & Affect wnl, Vitals stable   General appearance inés ii   Vitals stable   Alert, oriented and in no acute distress     Stuck on papules and brown macules on trunk and ext and face   Red papules on trunk   Flesh colored papules on trunk          Eyes: Conjunctivae/lids:Normal     ENT: Lips, mucosa: normal    MSK:Normal    Cardiovascular: peripheral edema none    Pulm: Breathing Normal    Neuro/Psych: Orientation:Normal; Mood/Affect:Normal      A/P:  1. Seborrheic keratosis, lentigo, angioma, dermal nevus  It was a pleasure speaking to Gail Dewitt today.  Previous clinic  notes and pertinent laboratory tests were reviewed prior to Gail Dewitt's visit.  Nature and genetics of benign skin lesions dicussed with patient.  Pathophysiology discussed with pateint and information provided   Signs and Symptoms of skin cancer discussed with  patient.  Patient encouraged to perform monthly skin exams.  UV precautions reviewed with patient.  Return to clinic 12 months      Again, thank you for allowing me to participate in the care of your patient.        Sincerely,        Tadeo Covarrubias MD

## 2024-10-28 NOTE — NURSING NOTE
Gail Dewitt's chief complaint for this visit includes:  Chief Complaint   Patient presents with    Skin Check     Patient has no current concerns. Only upper body skin check.     PCP: Jatin Dye    Referring Provider:  Referred Self, MD  No address on file    There were no vitals taken for this visit.  No Pain (0)        Allergies   Allergen Reactions    Dilaudid [Hydromorphone Hcl] Hives    Ace Inhibitors Cough    Amlodipine Besylate [Amlodipine] Swelling    Percocet [Oxycodone-Acetaminophen] Other (See Comments)     Severe dizziness           Do you need any medication refills at today's visit? No    Ann-Marie Kimball MA

## 2024-10-28 NOTE — PROGRESS NOTES
Gail Dewitt , a 79 year old year old female patient, I was asked to see by Dr. Dye for spots on skin.  Patient has no other skin complaints today.  Remainder of the HPI, Meds, PMH, Allergies, FH, and SH was reviewed in chart.      Past Medical History:   Diagnosis Date    Arthritis     Dyslipidemia     Eczema     Female bladder prolapse     Need for prophylactic hormone replacement therapy (postmenopausal)     Obesity     Unspecified essential hypertension     Unspecified hypothyroidism     Urge incontinence        Past Surgical History:   Procedure Laterality Date    APPENDECTOMY      ARTHROPLASTY ANKLE Left 4/15/2022    Procedure: Left Total ankle arthroplasty and prophylactic screw of tibia;  Surgeon: Scott Montenegro MD;  Location: [facility] OR    ARTHROPLASTY KNEE Left 12/13/2021    Procedure: LEFT TOTAL KNEE ARTHROPLASTY;  Surgeon: Quinton Cleary MD;  Location: [facility] OR    BREAST BIOPSY, RT/LT Right 1970    BUNIONECTOMY CHEVRON AND ERIBERTO, COMBINED Left 4/15/2022    Procedure: Akin osteotomy, left foot;  Surgeon: Scott Montenegro MD;  Location: [facility] OR    COLPORRHAPHY ANTERIOR  12/6/2010    COLPORRHAPHY ANTERIOR performed by MAY RIVERA at [facility] OR    CYSTOCELE REPAIR  2007    ECTOPIC PREGNANCY SURGERY  1969    EGD  2019    FOOT SURGERY Bilateral     Subtalar foot fusion and bunion surgery    HYSTERECTOMY, JOSEE  1978    Hysterectomy    INJECT EPIDURAL LUMBAR  10/19/2011    Procedure:INJECT EPIDURAL LUMBAR; DESHAWN-; Surgeon:GENERIC ANESTHESIA PROVIDER; Location:[facility] OR    INJECT JOINT SACROILIAC  2/7/2011    INJECT JOINT SACROILIAC performed by GENERIC ANESTHESIA PROVIDER at [facility] OR    INJECT JOINT SACROILIAC  4/25/2012    Procedure:INJECT JOINT SACROILIAC; DESHAWN SI Joint --; Surgeon:GENERIC ANESTHESIA PROVIDER; Location:[facility]    OOPHORECTOMY      PHACOEMULSIFICATION WITH STANDARD INTRAOCULAR LENS IMPLANT  5/26/2011    Procedure:PHACOEMULSIFICATION WITH STANDARD INTRAOCULAR LENS  IMPLANT; Surgeon:JOSÉ MANUEL DORSEY; Location:[facility]    PHACOEMULSIFICATION WITH STANDARD INTRAOCULAR LENS IMPLANT  2011    Procedure:PHACOEMULSIFICATION WITH STANDARD INTRAOCULAR LENS IMPLANT; Surgeon:JOSÉ MANUEL DORSEY; Location:[facility]    RECTOCELE REPAIR  2007    REMOVE HARDWARE FOOT Left 4/15/2022    Procedure: Hardware removal left foot;  Surgeon: Scott Montenegro MD;  Location: [facility]    REPAIR HAMMER TOE Left 4/15/2022    Procedure: Second toe hammertoe correction, left foot;  Surgeon: Scott Montenegro MD;  Location: [facility]    TONSILLECTOMY          Family History   Problem Relation Age of Onset    Cardiovascular Brother     Hypertension Brother     Thyroid Disease Brother     Cardiovascular Paternal Grandmother     Heart Disease Paternal Grandmother         RIP MI age 71    Cardiovascular Paternal Grandfather     C.A.D. Mother     Hypertension Mother     Breast Cancer Mother     Alcohol/Drug Mother     Allergies Mother     Depression Mother     Osteoporosis Mother     Breast Cancer Maternal Grandmother     Breast Cancer Sister     Osteoporosis Sister     Liver Disease Sister         PBC    Thyroid Disease Father     Cardiovascular Maternal Grandfather     Heart Disease Maternal Grandfather         RIP MI, age 81    Neurologic Disorder Son     Obesity Daughter     Gastrointestinal Disease Daughter         gallbladder surg    Thyroid Disease Son        Social History     Socioeconomic History    Marital status:      Spouse name: Not on file    Number of children: Not on file    Years of education: Not on file    Highest education level: Not on file   Occupational History    Not on file   Tobacco Use    Smoking status: Former     Current packs/day: 0.00     Types: Cigarettes     Start date: 1965     Quit date: 1985     Years since quittin.1    Smokeless tobacco: Never   Vaping Use    Vaping status: Never Used   Substance and Sexual Activity    Alcohol use: Not Currently     Drug use: No    Sexual activity: Not Currently   Other Topics Concern     Service No    Blood Transfusions No    Caffeine Concern No    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 Yes     Comment: brother age 55, stents, pacemaker   Social History Narrative    Not on file     Social Drivers of Health     Financial Resource Strain: High Risk (12/22/2021)    Received from [employer]    Financial Resource Strain     Difficulty of Paying Living Expenses: Not on file     Difficulty of Paying Living Expenses: Not on file   Food Insecurity: Not on file   Transportation Needs: Not on file   Physical Activity: Not on file   Stress: Not on file   Social Connections: Unknown (12/22/2021)    Received from [employer]    Social Connections     Frequency of Communication with Friends and Family: Not on file   Interpersonal Safety: Not on file   Housing Stability: Not on file       Outpatient Encounter Medications as of 10/28/2024   Medication Sig Dispense Refill    aspirin 81 MG EC tablet Take 81 mg by mouth daily.      Calcium Carbonate-Vit D-Min (CALCIUM 1200 PO) Take 1 tablet by mouth daily (Patient not taking: Reported on 9/30/2024)      diphenhydrAMINE-acetaminophen (TYLENOL PM)  MG tablet Take 1 tablet by mouth nightly as needed for sleep.      levothyroxine (SYNTHROID/LEVOTHROID) 150 MCG tablet Take 150 mcg by mouth daily      losartan (COZAAR) 50 MG tablet Take 1 tablet (50 mg) by mouth daily 90 tablet 3    omeprazole 20 MG tablet Take 20 mg by mouth every evening       propylene glycol (SYSTANE  BALANCE) 0.6 % SOLN ophthalmic solution Place 1 drop into both eyes 2 times daily       ursodiol (ACTIGALL) 500 MG tablet Take 1 tablet (500 mg) by mouth 3 times daily. 270 tablet 3    Vitamin D, Cholecalciferol, 25 MCG (1000 UT) CAPS Take by mouth.       No facility-administered encounter medications on file as of 10/28/2024.             Review Of Systems  Skin: As above  Eyes: negative  Ears/Nose/Throat: negative  Respiratory: No shortness of breath, dyspnea on exertion, cough, or hemoptysis  Cardiovascular: negative  Gastrointestinal: negative  Genitourinary: negative  Musculoskeletal: negative  Neurologic: negative  Psychiatric: negative  Hematologic/Lymphatic/Immunologic: negative  Endocrine: negative      O:   NAD, WDWN, Alert & Oriented, Mood & Affect wnl, Vitals stable   General appearance inés ii   Vitals stable   Alert, oriented and in no acute distress     Stuck on papules and brown macules on trunk and ext and face   Red papules on trunk   Flesh colored papules on trunk          Eyes: Conjunctivae/lids:Normal     ENT: Lips, mucosa: normal    MSK:Normal    Cardiovascular: peripheral edema none    Pulm: Breathing Normal    Neuro/Psych: Orientation:Normal; Mood/Affect:Normal      A/P:  1. Seborrheic keratosis, lentigo, angioma, dermal nevus  It was a pleasure speaking to Gail Dewitt today.  Previous clinic  notes and pertinent laboratory tests were reviewed prior to Gail Dewitt's visit.  Nature and genetics of benign skin lesions dicussed with patient.  Pathophysiology discussed with pateint and information provided   Signs and Symptoms of skin cancer discussed with patient.  Patient encouraged to perform monthly skin exams.  UV precautions reviewed with patient.  Return to clinic 12 months

## 2025-01-07 NOTE — PROGRESS NOTES
"   12/14/21 1043   Quick Adds   Quick Adds Certification   Type of Visit Initial Occupational Therapy Evaluation   Living Environment   People in home spouse   Current Living Arrangements house   Home Accessibility no concerns   Living Environment Comments Delmi can assist at first, but other son far away   Self-Care   Usual Activity Tolerance good   Current Activity Tolerance fair   Equipment Currently Used at Home walker, rolling;raised toilet seat;grab bar, toilet;grab bar, tub/shower  (walk-in shower )   Instrumental Activities of Daily Living (IADL)   IADL Comments indep with driving and all IADL.   General Information   Onset of Illness/Injury or Date of Surgery 12/13/21   Referring Physician Dr. Cleary   Patient/Family Therapy Goal Statement (OT) \"dress myself\"   Additional Occupational Profile Info/Pertinent History of Current Problem mod    Performance Patterns (Routines, Roles, Habits) mod   Existing Precautions/Restrictions weight bearing   Left Lower Extremity (Weight-bearing Status) weight-bearing as tolerated (WBAT)   Cognitive Status Examination   Orientation Status orientation to person, place and time   Visual Perception   Visual Impairment/Limitations WNL   Sensory   Sensory Quick Adds No deficits were identified   Range of Motion Comprehensive   General Range of Motion no range of motion deficits identified   Strength Comprehensive (MMT)   General Manual Muscle Testing (MMT) Assessment   (gen weakness)   Coordination   Upper Extremity Coordination No deficits were identified   Transfers   Transfer Comments need incr cues/time for safety/function.   Balance   Balance Comments anxious for walking in place, able to weight shift without lifting LEs.   Activities of Daily Living   BADL Assessment lower body dressing;upper body dressing   Upper Body Dressing Assessment   [place] Level (Upper Body Dressing) supervision;set up   Position (Upper Body Dressing) supported standing;supported sitting "   Lower Body Dressing Assessment   [place] Level (Lower Body Dressing) maximum assist (25% patient effort)   Assistive Devices (Lower Body Dressing) reacher;sock-aid;long-handled shoe horn   Position (Lower Body Dressing) supported standing;supported sitting   Comment (Lower Body Dressing) needed incr time/effort for all LB dress and was anxious with movement. Decr flexibility and strength. Incr cues for safety.   Clinical Impression   Criteria for Skilled Therapeutic Interventions Met (OT) yes   OT Diagnosis decr endurance/strength for ADL   OT Problem List-Impairments impacting ADL activity tolerance impaired;balance;flexibility;post-surgical precautions;mobility;strength   Assessment of Occupational Performance 1-3 Performance Deficits   Planned Therapy Interventions (OT) ADL retraining;transfer training   Clinical Decision Making Complexity (OT) moderate complexity   Therapy Frequency (OT) 2x/day   Risk & Benefits of therapy have been explained care plan/treatment goals reviewed;daughter;patient   OT Discharge Planning    OT Discharge Recommendation (DC Rec) Transitional Care Facility   OT Rationale for DC Rec Pt needs increased cues for all ADL.    Total Evaluation Time (Minutes)   Total Evaluation Time (Minutes) 5      Improve sit <->stand with supervision using RW by d/c.

## 2025-01-16 ENCOUNTER — TRANSCRIBE ORDERS (OUTPATIENT)
Dept: OTHER | Age: 80
End: 2025-01-16

## 2025-01-16 DIAGNOSIS — N32.81 OAB (OVERACTIVE BLADDER): Primary | ICD-10-CM

## 2025-01-23 ENCOUNTER — THERAPY VISIT (OUTPATIENT)
Dept: PHYSICAL THERAPY | Facility: CLINIC | Age: 80
End: 2025-01-23
Attending: UROLOGY
Payer: COMMERCIAL

## 2025-01-23 DIAGNOSIS — N32.81 OVERACTIVE BLADDER: Primary | ICD-10-CM

## 2025-01-23 PROCEDURE — 97530 THERAPEUTIC ACTIVITIES: CPT | Mod: GP | Performed by: PHYSICAL THERAPIST

## 2025-01-23 PROCEDURE — 97110 THERAPEUTIC EXERCISES: CPT | Mod: GP | Performed by: PHYSICAL THERAPIST

## 2025-01-23 PROCEDURE — 97535 SELF CARE MNGMENT TRAINING: CPT | Mod: GP | Performed by: PHYSICAL THERAPIST

## 2025-02-01 ENCOUNTER — HEALTH MAINTENANCE LETTER (OUTPATIENT)
Age: 80
End: 2025-02-01

## 2025-02-12 ENCOUNTER — THERAPY VISIT (OUTPATIENT)
Dept: PHYSICAL THERAPY | Facility: CLINIC | Age: 80
End: 2025-02-12
Attending: UROLOGY
Payer: COMMERCIAL

## 2025-02-12 DIAGNOSIS — N32.81 OVERACTIVE BLADDER: Primary | ICD-10-CM

## 2025-02-12 PROCEDURE — 90913 BFB TRAINING EA ADDL 15 MIN: CPT | Mod: GP | Performed by: PHYSICAL THERAPIST

## 2025-02-12 PROCEDURE — 97110 THERAPEUTIC EXERCISES: CPT | Mod: GP | Performed by: PHYSICAL THERAPIST

## 2025-02-12 PROCEDURE — 90912 BFB TRAINING 1ST 15 MIN: CPT | Mod: GP | Performed by: PHYSICAL THERAPIST

## 2025-02-12 PROCEDURE — 97535 SELF CARE MNGMENT TRAINING: CPT | Mod: GP | Performed by: PHYSICAL THERAPIST

## (undated) DEVICE — BLADE SAW LG BONE TORNIER 70X13X1.27MM SAW6944T

## (undated) DEVICE — BLADE SAW OSCIL/SAG STRK MICRO 5.5X18X0.38MM 2296-003-412

## (undated) DEVICE — DRAPE POUCH INSTRUMENT 3 POCKET 1018L

## (undated) DEVICE — SU VICRYL 2-0 CT-1 36" UND J945H

## (undated) DEVICE — SU VICRYL 0 CT-1 36" J946H

## (undated) DEVICE — Device

## (undated) DEVICE — CLOSURE DEVICE ZIPLINE 16CM ZIP16 PS1160

## (undated) DEVICE — SUCTION MANIFOLD NEPTUNE 2 SYS 4 PORT 0702-020-000

## (undated) DEVICE — CUSTOM PACK TOTAL KNEE SOP5BTKHEC

## (undated) DEVICE — GLOVE BIOGEL INDICATOR 7.5 LF 41675

## (undated) DEVICE — GLOVE BIOGEL PI ULTRATOUCH G SZ 7.0 42170

## (undated) DEVICE — DRSG GAUZE 4X4" TRAY

## (undated) DEVICE — GLOVE PROTEXIS BLUE W/NEU-THERA 8.0  2D73EB80

## (undated) DEVICE — HOOD FLYTE SURGICOOL

## (undated) DEVICE — TOURNIQUET CUFF 34" STERILE

## (undated) DEVICE — GLOVE UNDER INDICATOR PI SZ 8.5 LF 41685

## (undated) DEVICE — SOL WATER IRRIG 1000ML BOTTLE 2F7114

## (undated) DEVICE — PREP CHLORAPREP 26ML TINTED ORANGE  260815

## (undated) DEVICE — CAST PLASTER SPLINT XTRA FAST 5X30" 7392

## (undated) DEVICE — DRAPE C-ARM MINI 5423

## (undated) DEVICE — DRSG AQUACEL AG HYDROFIBER  3.5X10" 422605

## (undated) DEVICE — SOL NACL 0.9% IRRIG 1000ML BOTTLE 2F7124

## (undated) DEVICE — DRILL BIT L135 MM OD3 MM LJV528T

## (undated) DEVICE — GOWN XLG DISP 9545

## (undated) DEVICE — BLADE SAW OSCILLATING STRYK MED 9.0X25X0.38MM 2296-003-111

## (undated) DEVICE — GLOVE PROTEXIS BLUE W/NEU-THERA 7.5  2D73EB75

## (undated) DEVICE — BLADE KNIFE SURG 15 371115

## (undated) DEVICE — SOL NACL 0.9% INJ 1000ML BAG 2B1324X

## (undated) DEVICE — DECANTER VIAL 2006S

## (undated) DEVICE — GLOVE SURG PI ULTRA TOUCH M SZ 8 LF

## (undated) DEVICE — GLOVE PROTEXIS W/NEU-THERA 8.0  2D73TE80

## (undated) DEVICE — PLATE GROUNDING ADULT W/CORD 9165L

## (undated) DEVICE — BLADE SAW INTEGRA 85X21X1.27MM SAW6945T

## (undated) DEVICE — HOLDER LIMB VELCRO OR 0814-1533

## (undated) DEVICE — SU ETHILON 3-0 PS-1 18" 1663G

## (undated) DEVICE — CAST PADDING 6" STERILE 9046S

## (undated) DEVICE — SU PDO 1 STRATAFIX 36X36CM CTX TAPERPOINT SXPD2B405

## (undated) DEVICE — CUSTOM PACK TOTAL KNEE ACCESSORY SOP5BTAHEA

## (undated) DEVICE — DRSG TEGADERM 4X4 3/4" 1626W

## (undated) DEVICE — SET OF 12 PINS + 1 REAMER

## (undated) DEVICE — BLADE SAW RECIP STRK 70X12.5X0.80MM 0277-096-277

## (undated) DEVICE — SYR BULB IRRIG DOVER 60 ML LATEX FREE 67000

## (undated) DEVICE — SUTURE VICRYL+ 2-0 36IN CT-1 UND VCP945H

## (undated) DEVICE — ESU PENCIL SMOKE EVAC W/ROCKER SWITCH 0703-047-000

## (undated) DEVICE — GLOVE PROTEXIS W/NEU-THERA 7.0  2D73TE70

## (undated) DEVICE — SU STRATAFIX MONOCRYL 3-0 SPIRAL PS-2 30CM SXMP1B106

## (undated) DEVICE — DRSG AQUACEL AG 3.5X12" HYDROFIBER 420670

## (undated) DEVICE — BLADE SAW SAGITTAL STRK 18X90X1.27MM HD SYS 6 6118-127-090

## (undated) DEVICE — DRSG XEROFORM 5X9" 8884431605

## (undated) DEVICE — KIT DRAIN CLOSED WOUND SUCTION MED 400ML RESVR

## (undated) DEVICE — BANDAGE ELASTIC 6X550 LF DBL 593-96LF

## (undated) DEVICE — SU VICRYL 3-0 CT-1 36" J944H

## (undated) DEVICE — DRSG STERI STRIP 1/2X4" R1547

## (undated) RX ORDER — CEFAZOLIN SODIUM/WATER 2 G/20 ML
SYRINGE (ML) INTRAVENOUS
Status: DISPENSED
Start: 2022-04-15

## (undated) RX ORDER — LIDOCAINE HCL/EPINEPHRINE/PF 2%-1:200K
VIAL (ML) INJECTION
Status: DISPENSED
Start: 2022-04-15

## (undated) RX ORDER — FENTANYL CITRATE-0.9 % NACL/PF 10 MCG/ML
PLASTIC BAG, INJECTION (ML) INTRAVENOUS
Status: DISPENSED
Start: 2022-04-15

## (undated) RX ORDER — ONDANSETRON 2 MG/ML
INJECTION INTRAMUSCULAR; INTRAVENOUS
Status: DISPENSED
Start: 2022-04-15

## (undated) RX ORDER — KETOROLAC TROMETHAMINE 15 MG/ML
INJECTION, SOLUTION INTRAMUSCULAR; INTRAVENOUS
Status: DISPENSED
Start: 2022-04-15

## (undated) RX ORDER — ROPIVACAINE HYDROCHLORIDE 5 MG/ML
INJECTION, SOLUTION EPIDURAL; INFILTRATION; PERINEURAL
Status: DISPENSED
Start: 2022-04-15

## (undated) RX ORDER — DEXMEDETOMIDINE HYDROCHLORIDE 100 UG/ML
INJECTION, SOLUTION INTRAVENOUS
Status: DISPENSED
Start: 2022-04-15

## (undated) RX ORDER — VANCOMYCIN HYDROCHLORIDE 1 G/20ML
INJECTION, POWDER, LYOPHILIZED, FOR SOLUTION INTRAVENOUS
Status: DISPENSED
Start: 2022-04-15

## (undated) RX ORDER — HYDROXYZINE HYDROCHLORIDE 10 MG/1
TABLET, FILM COATED ORAL
Status: DISPENSED
Start: 2022-04-15

## (undated) RX ORDER — MAGNESIUM SULFATE HEPTAHYDRATE 40 MG/ML
INJECTION, SOLUTION INTRAVENOUS
Status: DISPENSED
Start: 2022-04-15

## (undated) RX ORDER — PROPOFOL 10 MG/ML
INJECTION, EMULSION INTRAVENOUS
Status: DISPENSED
Start: 2022-04-15

## (undated) RX ORDER — GABAPENTIN 100 MG/1
CAPSULE ORAL
Status: DISPENSED
Start: 2022-04-15

## (undated) RX ORDER — DEXAMETHASONE SODIUM PHOSPHATE 4 MG/ML
INJECTION, SOLUTION INTRA-ARTICULAR; INTRALESIONAL; INTRAMUSCULAR; INTRAVENOUS; SOFT TISSUE
Status: DISPENSED
Start: 2022-04-15

## (undated) RX ORDER — ACETAMINOPHEN 325 MG/1
TABLET ORAL
Status: DISPENSED
Start: 2022-04-15